# Patient Record
Sex: MALE | Race: WHITE | Employment: FULL TIME | URBAN - METROPOLITAN AREA
[De-identification: names, ages, dates, MRNs, and addresses within clinical notes are randomized per-mention and may not be internally consistent; named-entity substitution may affect disease eponyms.]

---

## 2017-01-03 ENCOUNTER — OFFICE VISIT (OUTPATIENT)
Dept: FAMILY MEDICINE CLINIC | Facility: CLINIC | Age: 32
End: 2017-01-03

## 2017-01-03 ENCOUNTER — TELEPHONE (OUTPATIENT)
Dept: FAMILY MEDICINE CLINIC | Facility: CLINIC | Age: 32
End: 2017-01-03

## 2017-01-03 VITALS
HEIGHT: 68.5 IN | BODY MASS INDEX: 34.16 KG/M2 | DIASTOLIC BLOOD PRESSURE: 72 MMHG | HEART RATE: 74 BPM | SYSTOLIC BLOOD PRESSURE: 124 MMHG | RESPIRATION RATE: 18 BRPM | WEIGHT: 228 LBS

## 2017-01-03 DIAGNOSIS — M54.16 LUMBAR RADICULOPATHY: ICD-10-CM

## 2017-01-03 DIAGNOSIS — Z79.899 HIGH RISK MEDICATION USE: ICD-10-CM

## 2017-01-03 DIAGNOSIS — M51.37 DDD (DEGENERATIVE DISC DISEASE), LUMBOSACRAL: Primary | ICD-10-CM

## 2017-01-03 DIAGNOSIS — R21 RASH: ICD-10-CM

## 2017-01-03 PROCEDURE — 99214 OFFICE O/P EST MOD 30 MIN: CPT | Performed by: FAMILY MEDICINE

## 2017-01-03 RX ORDER — VENLAFAXINE HYDROCHLORIDE 37.5 MG/1
37.5 CAPSULE, EXTENDED RELEASE ORAL DAILY
Qty: 30 CAPSULE | Refills: 2 | Status: SHIPPED | OUTPATIENT
Start: 2017-01-03 | End: 2020-06-19

## 2017-01-03 RX ORDER — LIDOCAINE 50 MG/G
1 PATCH TOPICAL EVERY 24 HOURS
Qty: 15 PATCH | Refills: 0 | Status: SHIPPED | OUTPATIENT
Start: 2017-01-03 | End: 2020-06-19

## 2017-01-03 RX ORDER — NABUMETONE 750 MG/1
750 TABLET, FILM COATED ORAL 2 TIMES DAILY
Qty: 30 TABLET | Refills: 2 | Status: SHIPPED | OUTPATIENT
Start: 2017-01-03 | End: 2020-06-19

## 2017-01-03 NOTE — PATIENT INSTRUCTIONS
-- moisturize hands frequently  -- try to wear gloves at work if possible  -- start steroid cream 2x/day for 1-2 wks  -- start venlafaxine  -- start nabumetone (with food up to 2/xday)  -- start lidocaine patch  -- clarify with insurance regarding pre-exis

## 2017-01-03 NOTE — PROGRESS NOTES
Avelino Shirley is a 32year old male here for Patient presents with:  Medication Follow-Up: The patient would like pain medication      HPI:     Chronic low back pain  -frustrated as no longer able to get norco due to positive marijuana in the expand 11/3/2015    Comment Procedure: LUMBAR FACET INJECTION;  Surgeon: Vipin Tripathi MD;  Location: [facility] MAIN OR     Right 12/17/2015    Comment Procedure: RADIOFREQUENCY ABLATION OF THORACIC OR LUMBAR MEDIAL BRANCH;  Surgeon: Vipin Tripathi MD;  Location: Allergies:  No Known Allergies      ROS:     --GEN: Denies  --HEENT: Denies  --RESP: Denies  --CV: Denies  --GI: Denies  --: Denies  --MSK: see hpi  --NEURO: Denies  --SKIN: Denies  All other systems reviewed are negative    PHYSICAL EXAM:

## 2017-01-19 ENCOUNTER — TELEPHONE (OUTPATIENT)
Dept: SURGERY | Facility: CLINIC | Age: 32
End: 2017-01-19

## 2017-01-26 ENCOUNTER — TELEPHONE (OUTPATIENT)
Dept: SURGERY | Facility: CLINIC | Age: 32
End: 2017-01-26

## 2017-01-26 NOTE — TELEPHONE ENCOUNTER
Patient no showed for injection procedure today. Please cancel the procedure. He no-showed procedure on 1/19/17 as well.    Thanks

## 2017-03-10 ENCOUNTER — TELEPHONE (OUTPATIENT)
Dept: SURGERY | Facility: CLINIC | Age: 32
End: 2017-03-10

## 2018-05-25 ENCOUNTER — HOSPITAL ENCOUNTER (OUTPATIENT)
Age: 33
Discharge: HOME | End: 2018-05-25
Attending: EMERGENCY MEDICINE
Payer: COMMERCIAL

## 2018-05-25 VITALS
OXYGEN SATURATION: 98 % | BODY MASS INDEX: 34 KG/M2 | SYSTOLIC BLOOD PRESSURE: 129 MMHG | WEIGHT: 225 LBS | RESPIRATION RATE: 16 BRPM | TEMPERATURE: 99 F | DIASTOLIC BLOOD PRESSURE: 84 MMHG | HEART RATE: 90 BPM

## 2018-05-25 DIAGNOSIS — L02.31 ABSCESS OF BUTTOCK, RIGHT: Primary | ICD-10-CM

## 2018-05-25 PROCEDURE — 87205 SMEAR GRAM STAIN: CPT | Performed by: EMERGENCY MEDICINE

## 2018-05-25 PROCEDURE — S0077 INJECTION, CLINDAMYCIN PHOSP: HCPCS

## 2018-05-25 PROCEDURE — 87186 SC STD MICRODIL/AGAR DIL: CPT | Performed by: EMERGENCY MEDICINE

## 2018-05-25 PROCEDURE — 87070 CULTURE OTHR SPECIMN AEROBIC: CPT | Performed by: EMERGENCY MEDICINE

## 2018-05-25 PROCEDURE — 96375 TX/PRO/DX INJ NEW DRUG ADDON: CPT

## 2018-05-25 PROCEDURE — 99214 OFFICE O/P EST MOD 30 MIN: CPT

## 2018-05-25 PROCEDURE — 87147 CULTURE TYPE IMMUNOLOGIC: CPT | Performed by: EMERGENCY MEDICINE

## 2018-05-25 PROCEDURE — 96365 THER/PROPH/DIAG IV INF INIT: CPT

## 2018-05-25 RX ORDER — KETOROLAC TROMETHAMINE 30 MG/ML
30 INJECTION, SOLUTION INTRAMUSCULAR; INTRAVENOUS ONCE
Status: DISCONTINUED | OUTPATIENT
Start: 2018-05-25 | End: 2018-05-25

## 2018-05-25 RX ORDER — HYDROCODONE BITARTRATE AND ACETAMINOPHEN 5; 325 MG/1; MG/1
1-2 TABLET ORAL EVERY 6 HOURS PRN
Qty: 20 TABLET | Refills: 0 | Status: SHIPPED | OUTPATIENT
Start: 2018-05-25 | End: 2018-06-04

## 2018-05-25 RX ORDER — KETOROLAC TROMETHAMINE 30 MG/ML
30 INJECTION, SOLUTION INTRAMUSCULAR; INTRAVENOUS ONCE
Status: COMPLETED | OUTPATIENT
Start: 2018-05-25 | End: 2018-05-25

## 2018-05-25 RX ORDER — CLINDAMYCIN HYDROCHLORIDE 300 MG/1
300 CAPSULE ORAL 4 TIMES DAILY
Qty: 40 CAPSULE | Refills: 0 | Status: SHIPPED | OUTPATIENT
Start: 2018-05-25 | End: 2018-06-04

## 2018-05-25 RX ORDER — CLINDAMYCIN PHOSPHATE 600 MG/50ML
600 INJECTION INTRAVENOUS ONCE
Status: COMPLETED | OUTPATIENT
Start: 2018-05-25 | End: 2018-05-25

## 2018-05-25 NOTE — ED PROVIDER NOTES
Patient Seen in: [id number] [facility]    History   Patient presents with:  Bite Sting,Insect (integumentary)    Stated Complaint: insect bite    HPI    Patient complains of a draining wound on his right buttock.   Patient thinks he may have an Temporal  SpO2: 98 %  O2 Device: None (Room air)    Current:/84   Pulse 104   Temp 99.3 °F (37.4 °C) (Temporal)   Resp 16   Wt 102.1 kg   SpO2 98%   BMI 33.71 kg/m²         Physical Exam  Buttock:  On inspection, on the right buttock there is an area Tab  Take 1-2 tablets by mouth every 6 (six) hours as needed.   Qty: 20 tablet Refills: 0

## 2018-05-25 NOTE — ED INITIAL ASSESSMENT (HPI)
Pt noticed a possible bug bite/sting to his right buttocks. Patient feels a little fatigued and achy today. Patient states spouce popped the bite yesterday and noticed brown/red puss coming out of it. Area was red and had black spot in center yesterday.

## 2019-01-24 ENCOUNTER — IMAGING SERVICES (OUTPATIENT)
Dept: OTHER | Age: 34
End: 2019-01-24

## 2019-05-06 ENCOUNTER — TELEPHONE (OUTPATIENT)
Dept: ORTHOPEDICS | Age: 34
End: 2019-05-06

## 2019-05-07 ENCOUNTER — IMAGING SERVICES (OUTPATIENT)
Dept: GENERAL RADIOLOGY | Age: 34
End: 2019-05-07

## 2019-05-09 ENCOUNTER — WORKER'S COMP (OUTPATIENT)
Dept: SPORTS MEDICINE | Age: 34
End: 2019-05-09

## 2019-05-09 VITALS
BODY MASS INDEX: 35.55 KG/M2 | HEIGHT: 69 IN | RESPIRATION RATE: 16 BRPM | HEART RATE: 74 BPM | SYSTOLIC BLOOD PRESSURE: 118 MMHG | WEIGHT: 240 LBS | DIASTOLIC BLOOD PRESSURE: 78 MMHG

## 2019-05-09 DIAGNOSIS — S96.811A RUPTURE OF RIGHT PLANTARIS TENDON, INITIAL ENCOUNTER: Primary | ICD-10-CM

## 2019-05-09 PROCEDURE — L4387 NON-PNEUM WALK BOOT PRE OTS: HCPCS

## 2019-05-09 PROCEDURE — 99204 OFFICE O/P NEW MOD 45 MIN: CPT | Performed by: PEDIATRICS

## 2019-05-09 RX ORDER — NAPROXEN 500 MG/1
500 TABLET ORAL 2 TIMES DAILY WITH MEALS
Qty: 20 TABLET | Refills: 0 | Status: SHIPPED | OUTPATIENT
Start: 2019-05-09 | End: 2019-05-19

## 2019-05-09 RX ORDER — DEXTROAMPHETAMINE SACCHARATE, AMPHETAMINE ASPARTATE, DEXTROAMPHETAMINE SULFATE AND AMPHETAMINE SULFATE 2.5; 2.5; 2.5; 2.5 MG/1; MG/1; MG/1; MG/1
10 TABLET ORAL 3 TIMES DAILY
COMMUNITY

## 2019-05-09 RX ORDER — ALPRAZOLAM 0.5 MG/1
TABLET ORAL
Refills: 3 | COMMUNITY
Start: 2019-04-17 | End: 2020-07-22 | Stop reason: ALTCHOICE

## 2019-05-09 SDOH — HEALTH STABILITY: MENTAL HEALTH: HOW OFTEN DO YOU HAVE A DRINK CONTAINING ALCOHOL?: NEVER

## 2019-05-15 ENCOUNTER — WORKER'S COMP (OUTPATIENT)
Dept: PHYSICAL THERAPY | Age: 34
End: 2019-05-15
Attending: PEDIATRICS

## 2019-05-15 DIAGNOSIS — S96.811D RUPTURE OF RIGHT PLANTARIS TENDON, SUBSEQUENT ENCOUNTER: Primary | ICD-10-CM

## 2019-05-15 PROBLEM — S96.811A: Status: ACTIVE | Noted: 2019-05-15

## 2019-05-15 PROCEDURE — 97010 HOT OR COLD PACKS THERAPY: CPT | Performed by: PHYSICAL THERAPIST

## 2019-05-15 PROCEDURE — 97140 MANUAL THERAPY 1/> REGIONS: CPT | Performed by: PHYSICAL THERAPIST

## 2019-05-15 PROCEDURE — 97014 ELECTRIC STIMULATION THERAPY: CPT | Performed by: PHYSICAL THERAPIST

## 2019-05-15 PROCEDURE — 97112 NEUROMUSCULAR REEDUCATION: CPT | Performed by: PHYSICAL THERAPIST

## 2019-05-15 PROCEDURE — 97161 PT EVAL LOW COMPLEX 20 MIN: CPT | Performed by: PHYSICAL THERAPIST

## 2019-05-15 PROCEDURE — 97110 THERAPEUTIC EXERCISES: CPT | Performed by: PHYSICAL THERAPIST

## 2019-05-21 ENCOUNTER — WORKER'S COMP (OUTPATIENT)
Dept: PHYSICAL THERAPY | Age: 34
End: 2019-05-21

## 2019-05-21 DIAGNOSIS — S96.811D RUPTURE OF RIGHT PLANTARIS TENDON, SUBSEQUENT ENCOUNTER: Primary | ICD-10-CM

## 2019-05-21 PROCEDURE — 97110 THERAPEUTIC EXERCISES: CPT | Performed by: PHYSICAL THERAPIST

## 2019-05-21 PROCEDURE — 97014 ELECTRIC STIMULATION THERAPY: CPT | Performed by: PHYSICAL THERAPIST

## 2019-05-21 PROCEDURE — 97112 NEUROMUSCULAR REEDUCATION: CPT | Performed by: PHYSICAL THERAPIST

## 2019-05-21 PROCEDURE — 97140 MANUAL THERAPY 1/> REGIONS: CPT | Performed by: PHYSICAL THERAPIST

## 2019-05-21 PROCEDURE — 97010 HOT OR COLD PACKS THERAPY: CPT | Performed by: PHYSICAL THERAPIST

## 2019-05-23 ENCOUNTER — WORKER'S COMP (OUTPATIENT)
Dept: PHYSICAL THERAPY | Age: 34
End: 2019-05-23

## 2019-05-23 ENCOUNTER — WORKER'S COMP (OUTPATIENT)
Dept: SPORTS MEDICINE | Age: 34
End: 2019-05-23

## 2019-05-23 DIAGNOSIS — S96.811D RUPTURE OF RIGHT PLANTARIS TENDON, SUBSEQUENT ENCOUNTER: Primary | ICD-10-CM

## 2019-05-23 PROCEDURE — 99213 OFFICE O/P EST LOW 20 MIN: CPT | Performed by: PEDIATRICS

## 2019-05-23 PROCEDURE — 97140 MANUAL THERAPY 1/> REGIONS: CPT | Performed by: PHYSICAL THERAPIST

## 2019-05-23 PROCEDURE — 97112 NEUROMUSCULAR REEDUCATION: CPT | Performed by: PHYSICAL THERAPIST

## 2019-05-23 PROCEDURE — 97110 THERAPEUTIC EXERCISES: CPT | Performed by: PHYSICAL THERAPIST

## 2019-05-23 PROCEDURE — 97014 ELECTRIC STIMULATION THERAPY: CPT | Performed by: PHYSICAL THERAPIST

## 2019-05-23 PROCEDURE — 97010 HOT OR COLD PACKS THERAPY: CPT | Performed by: PHYSICAL THERAPIST

## 2019-06-03 ENCOUNTER — TELEPHONE (OUTPATIENT)
Dept: PHYSICAL THERAPY | Age: 34
End: 2019-06-03

## 2019-06-04 ENCOUNTER — TELEPHONE (OUTPATIENT)
Dept: SPORTS MEDICINE | Age: 34
End: 2019-06-04

## 2019-06-04 ENCOUNTER — OFFICE VISIT (OUTPATIENT)
Dept: SPORTS MEDICINE | Age: 34
End: 2019-06-04

## 2019-06-04 DIAGNOSIS — S96.811D RUPTURE OF RIGHT PLANTARIS TENDON, SUBSEQUENT ENCOUNTER: Primary | ICD-10-CM

## 2019-06-04 DIAGNOSIS — M25.371 ANKLE JOINT INSTABILITY, RIGHT: ICD-10-CM

## 2019-06-04 PROCEDURE — 99213 OFFICE O/P EST LOW 20 MIN: CPT | Performed by: PEDIATRICS

## 2019-06-04 PROCEDURE — L1902 AFO ANKLE GAUNTLET PRE OTS: HCPCS

## 2019-06-05 ENCOUNTER — WORKER'S COMP (OUTPATIENT)
Dept: PHYSICAL THERAPY | Age: 34
End: 2019-06-05

## 2019-06-05 DIAGNOSIS — S96.811D RUPTURE OF RIGHT PLANTARIS TENDON, SUBSEQUENT ENCOUNTER: Primary | ICD-10-CM

## 2019-06-05 PROCEDURE — 97110 THERAPEUTIC EXERCISES: CPT | Performed by: PHYSICAL THERAPIST

## 2019-06-05 PROCEDURE — 97112 NEUROMUSCULAR REEDUCATION: CPT | Performed by: PHYSICAL THERAPIST

## 2019-06-05 PROCEDURE — 97140 MANUAL THERAPY 1/> REGIONS: CPT | Performed by: PHYSICAL THERAPIST

## 2019-06-11 ENCOUNTER — WORKER'S COMP (OUTPATIENT)
Dept: PHYSICAL THERAPY | Age: 34
End: 2019-06-11

## 2019-06-11 DIAGNOSIS — S96.811D RUPTURE OF RIGHT PLANTARIS TENDON, SUBSEQUENT ENCOUNTER: Primary | ICD-10-CM

## 2019-06-11 PROCEDURE — 97112 NEUROMUSCULAR REEDUCATION: CPT | Performed by: PHYSICAL THERAPIST

## 2019-06-11 PROCEDURE — 97014 ELECTRIC STIMULATION THERAPY: CPT | Performed by: PHYSICAL THERAPIST

## 2019-06-11 PROCEDURE — 97110 THERAPEUTIC EXERCISES: CPT | Performed by: PHYSICAL THERAPIST

## 2019-06-11 PROCEDURE — 97140 MANUAL THERAPY 1/> REGIONS: CPT | Performed by: PHYSICAL THERAPIST

## 2019-06-17 ENCOUNTER — TELEPHONE (OUTPATIENT)
Dept: PHYSICAL THERAPY | Age: 34
End: 2019-06-17

## 2019-06-17 DIAGNOSIS — S96.811D RUPTURE OF RIGHT PLANTARIS TENDON, SUBSEQUENT ENCOUNTER: Primary | ICD-10-CM

## 2019-06-25 ENCOUNTER — WORKER'S COMP (OUTPATIENT)
Dept: SPORTS MEDICINE | Age: 34
End: 2019-06-25

## 2019-06-25 ENCOUNTER — TELEPHONE (OUTPATIENT)
Dept: SPORTS MEDICINE | Age: 34
End: 2019-06-25

## 2019-06-25 DIAGNOSIS — T14.8XXA CONTUSION OF BONE: ICD-10-CM

## 2019-06-25 DIAGNOSIS — S96.811D RUPTURE OF RIGHT PLANTARIS TENDON, SUBSEQUENT ENCOUNTER: Primary | ICD-10-CM

## 2019-06-25 DIAGNOSIS — M25.374 SUBTALAR JOINT INSTABILITY, RIGHT: ICD-10-CM

## 2019-06-25 PROCEDURE — 99213 OFFICE O/P EST LOW 20 MIN: CPT | Performed by: PEDIATRICS

## 2019-06-25 PROCEDURE — L4387 NON-PNEUM WALK BOOT PRE OTS: HCPCS

## 2019-06-26 DIAGNOSIS — S96.811D RUPTURE OF RIGHT PLANTARIS TENDON, SUBSEQUENT ENCOUNTER: ICD-10-CM

## 2019-07-18 ENCOUNTER — TELEPHONE (OUTPATIENT)
Dept: SCHEDULING | Age: 34
End: 2019-07-18

## 2019-07-23 ENCOUNTER — IMAGING SERVICES (OUTPATIENT)
Dept: GENERAL RADIOLOGY | Age: 34
End: 2019-07-23
Attending: PEDIATRICS

## 2019-07-23 ENCOUNTER — TELEPHONE (OUTPATIENT)
Dept: SPORTS MEDICINE | Age: 34
End: 2019-07-23

## 2019-07-23 ENCOUNTER — WORKER'S COMP (OUTPATIENT)
Dept: SPORTS MEDICINE | Age: 34
End: 2019-07-23

## 2019-07-23 DIAGNOSIS — S92.124A CLOSED NONDISPLACED FRACTURE OF BODY OF RIGHT TALUS, INITIAL ENCOUNTER: Primary | ICD-10-CM

## 2019-07-23 DIAGNOSIS — S96.811D RUPTURE OF RIGHT PLANTARIS TENDON, SUBSEQUENT ENCOUNTER: ICD-10-CM

## 2019-07-23 PROCEDURE — 73610 X-RAY EXAM OF ANKLE: CPT | Performed by: RADIOLOGY

## 2019-07-23 PROCEDURE — 99213 OFFICE O/P EST LOW 20 MIN: CPT | Performed by: PEDIATRICS

## 2019-07-31 ENCOUNTER — WORKER'S COMP (OUTPATIENT)
Dept: PODIATRY | Age: 34
End: 2019-07-31

## 2019-07-31 DIAGNOSIS — S90.31XD CONTUSION OF RIGHT FOOT, SUBSEQUENT ENCOUNTER: ICD-10-CM

## 2019-07-31 DIAGNOSIS — S92.135A CLOSED NONDISPLACED FRACTURE OF POSTERIOR PROCESS OF LEFT TALUS, INITIAL ENCOUNTER: Primary | ICD-10-CM

## 2019-07-31 DIAGNOSIS — M65.9 SYNOVITIS OF RIGHT FOOT: ICD-10-CM

## 2019-07-31 DIAGNOSIS — M65.9 SYNOVITIS OF RIGHT ANKLE: ICD-10-CM

## 2019-07-31 PROCEDURE — 99244 OFF/OP CNSLTJ NEW/EST MOD 40: CPT | Performed by: PODIATRIST

## 2019-07-31 PROCEDURE — 20600 DRAIN/INJ JOINT/BURSA W/O US: CPT | Performed by: PODIATRIST

## 2019-07-31 PROCEDURE — 20605 DRAIN/INJ JOINT/BURSA W/O US: CPT | Performed by: PODIATRIST

## 2019-07-31 RX ORDER — DEXAMETHASONE SODIUM PHOSPHATE 4 MG/ML
4 INJECTION, SOLUTION INTRA-ARTICULAR; INTRALESIONAL; INTRAMUSCULAR; INTRAVENOUS; SOFT TISSUE ONCE
Status: COMPLETED | OUTPATIENT
Start: 2019-07-31 | End: 2019-08-01

## 2019-07-31 RX ORDER — DEXAMETHASONE SODIUM PHOSPHATE 4 MG/ML
8 INJECTION, SOLUTION INTRA-ARTICULAR; INTRALESIONAL; INTRAMUSCULAR; INTRAVENOUS; SOFT TISSUE ONCE
Status: CANCELLED | OUTPATIENT
Start: 2019-07-31 | End: 2019-07-31

## 2019-07-31 RX ORDER — METHYLPREDNISOLONE 4 MG/1
TABLET ORAL
Qty: 21 TABLET | Refills: 0 | Status: SHIPPED | OUTPATIENT
Start: 2019-07-31 | End: 2019-11-08 | Stop reason: ALTCHOICE

## 2019-07-31 RX ORDER — METHYLPREDNISOLONE ACETATE 40 MG/ML
40 INJECTION, SUSPENSION INTRA-ARTICULAR; INTRALESIONAL; INTRAMUSCULAR; SOFT TISSUE ONCE
Status: COMPLETED | OUTPATIENT
Start: 2019-07-31 | End: 2019-08-01

## 2019-08-01 RX ADMIN — DEXAMETHASONE SODIUM PHOSPHATE 4 MG: 4 INJECTION, SOLUTION INTRA-ARTICULAR; INTRALESIONAL; INTRAMUSCULAR; INTRAVENOUS; SOFT TISSUE at 08:44

## 2019-08-01 RX ADMIN — METHYLPREDNISOLONE ACETATE 40 MG: 40 INJECTION, SUSPENSION INTRA-ARTICULAR; INTRALESIONAL; INTRAMUSCULAR; SOFT TISSUE at 08:41

## 2019-08-01 RX ADMIN — DEXAMETHASONE SODIUM PHOSPHATE 4 MG: 4 INJECTION, SOLUTION INTRA-ARTICULAR; INTRALESIONAL; INTRAMUSCULAR; INTRAVENOUS; SOFT TISSUE at 08:45

## 2019-08-08 ENCOUNTER — HOSPITAL ENCOUNTER (OUTPATIENT)
Age: 34
Discharge: HOME | End: 2019-08-08
Attending: FAMILY MEDICINE
Payer: COMMERCIAL

## 2019-08-08 VITALS
HEART RATE: 76 BPM | RESPIRATION RATE: 16 BRPM | DIASTOLIC BLOOD PRESSURE: 58 MMHG | HEIGHT: 69 IN | OXYGEN SATURATION: 96 % | SYSTOLIC BLOOD PRESSURE: 115 MMHG | BODY MASS INDEX: 35.55 KG/M2 | TEMPERATURE: 99 F | WEIGHT: 240 LBS

## 2019-08-08 DIAGNOSIS — R09.81 SINUS CONGESTION: ICD-10-CM

## 2019-08-08 DIAGNOSIS — H10.33 ACUTE CONJUNCTIVITIS OF BOTH EYES, UNSPECIFIED ACUTE CONJUNCTIVITIS TYPE: Primary | ICD-10-CM

## 2019-08-08 DIAGNOSIS — J06.9 UPPER RESPIRATORY TRACT INFECTION, UNSPECIFIED TYPE: ICD-10-CM

## 2019-08-08 PROCEDURE — 99214 OFFICE O/P EST MOD 30 MIN: CPT

## 2019-08-08 PROCEDURE — 99213 OFFICE O/P EST LOW 20 MIN: CPT

## 2019-08-08 RX ORDER — TOBRAMYCIN 3 MG/ML
1 SOLUTION/ DROPS OPHTHALMIC EVERY 6 HOURS
Qty: 1 BOTTLE | Refills: 0 | Status: SHIPPED | OUTPATIENT
Start: 2019-08-08 | End: 2019-08-15

## 2019-08-08 RX ORDER — PREDNISONE 20 MG/1
40 TABLET ORAL DAILY
Qty: 10 TABLET | Refills: 0 | Status: SHIPPED | OUTPATIENT
Start: 2019-08-08 | End: 2019-08-13

## 2019-08-08 NOTE — ED INITIAL ASSESSMENT (HPI)
Pt states his daughter was sick 2 days ago with a cold. Pt for the past 2 days has had sinus congestion and cough and now redness, irritation and discharge from both eyes.

## 2019-08-08 NOTE — ED PROVIDER NOTES
Patient Seen in: [id number] [facility] [place]    History   Patient presents with:  Conjunctivitis    Stated Complaint: eye problem    HPI    **66-year-old male presents to the immediate care today with a 2-day history of nasal congestion, dry cough, s MAIN OR   • TRANSFORAMINAL LUMBAR EPIDURAL STEROID INJECTION MULTIPLE LEVEL AND BILATERAL Bilateral 9/8/2015    Performed by Jeramie Hammond MD at [facility] MAIN OR   • TRANSFORAMINAL LUMBAR EPIDURAL STEROID INJECTION MULTIPLE LEVEL AND BILATERAL Bilateral 9/1/ crusting noted. Cornea clear. No FB in cornea. Extra ocular muscles intact bilaterally. Normal visual acuity. Ears: normal TM's and external ear canals both ears  Nose: Nasal mucosa is congested. No sinus tenderness.   Tonsils are clear no exudates bilat

## 2019-08-14 NOTE — ED NOTES
Called stating his eye drop bottle had leaked and will not have enough drops for full treatment. He states he had 3 days of drops and has not used them in a couple days, but still having redness and itching.  Discussed with Dr Ruth Bach and recommend a reche

## 2019-08-22 ENCOUNTER — WORKER'S COMP (OUTPATIENT)
Dept: PODIATRY | Age: 34
End: 2019-08-22

## 2019-08-22 DIAGNOSIS — M65.9 SYNOVITIS OF RIGHT FOOT: ICD-10-CM

## 2019-08-22 DIAGNOSIS — S92.135P: Primary | ICD-10-CM

## 2019-08-22 DIAGNOSIS — S90.31XD CONTUSION OF RIGHT FOOT, SUBSEQUENT ENCOUNTER: ICD-10-CM

## 2019-08-22 DIAGNOSIS — M65.9 SYNOVITIS OF RIGHT ANKLE: ICD-10-CM

## 2019-08-22 PROCEDURE — 20605 DRAIN/INJ JOINT/BURSA W/O US: CPT | Performed by: PODIATRIST

## 2019-08-22 PROCEDURE — 20600 DRAIN/INJ JOINT/BURSA W/O US: CPT | Performed by: PODIATRIST

## 2019-08-22 PROCEDURE — 99214 OFFICE O/P EST MOD 30 MIN: CPT | Performed by: PODIATRIST

## 2019-08-27 ENCOUNTER — TELEPHONE (OUTPATIENT)
Dept: PODIATRY | Age: 34
End: 2019-08-27

## 2019-08-28 ENCOUNTER — TELEPHONE (OUTPATIENT)
Dept: PODIATRY | Age: 34
End: 2019-08-28

## 2019-08-29 PROBLEM — S92.135P: Status: ACTIVE | Noted: 2019-08-29

## 2019-09-05 ENCOUNTER — WORKER'S COMP (OUTPATIENT)
Dept: PODIATRY | Age: 34
End: 2019-09-05

## 2019-09-05 ENCOUNTER — TELEPHONE (OUTPATIENT)
Dept: PODIATRY | Age: 34
End: 2019-09-05

## 2019-09-05 DIAGNOSIS — M65.9 SYNOVITIS OF RIGHT FOOT: ICD-10-CM

## 2019-09-05 DIAGNOSIS — S92.131P: Primary | ICD-10-CM

## 2019-09-05 DIAGNOSIS — R93.7 BONE MARROW EDEMA: ICD-10-CM

## 2019-09-05 PROCEDURE — 99214 OFFICE O/P EST MOD 30 MIN: CPT | Performed by: PODIATRIST

## 2019-09-05 RX ORDER — DICLOFENAC SODIUM 75 MG/1
75 TABLET, DELAYED RELEASE ORAL 2 TIMES DAILY
Qty: 60 TABLET | Refills: 0 | Status: SHIPPED | OUTPATIENT
Start: 2019-09-05 | End: 2019-11-08 | Stop reason: ALTCHOICE

## 2019-09-05 SDOH — HEALTH STABILITY: MENTAL HEALTH: HOW OFTEN DO YOU HAVE A DRINK CONTAINING ALCOHOL?: NEVER

## 2019-09-10 PROBLEM — S92.131P: Status: ACTIVE | Noted: 2019-08-29

## 2019-09-27 ENCOUNTER — TELEPHONE (OUTPATIENT)
Dept: ORTHOPEDICS | Age: 34
End: 2019-09-27

## 2019-10-28 ENCOUNTER — TELEPHONE (OUTPATIENT)
Dept: ORTHOPEDICS | Age: 34
End: 2019-10-28

## 2019-10-30 ENCOUNTER — TELEPHONE (OUTPATIENT)
Dept: PODIATRY | Age: 34
End: 2019-10-30

## 2019-10-30 DIAGNOSIS — S92.135P: Primary | ICD-10-CM

## 2019-11-07 ENCOUNTER — LAB SERVICES (OUTPATIENT)
Dept: LAB | Age: 34
End: 2019-11-07

## 2019-11-07 DIAGNOSIS — M65.9 SYNOVITIS OF RIGHT ANKLE: Primary | ICD-10-CM

## 2019-11-07 DIAGNOSIS — M65.9 SYNOVITIS OF RIGHT ANKLE: ICD-10-CM

## 2019-11-07 LAB
BUN SERPL-MCNC: 21 MG/DL (ref 6–27)
CALCIUM SERPL-MCNC: 8.9 MG/DL (ref 8.6–10.6)
CHLORIDE SERPL-SCNC: 102 MMOL/L (ref 96–107)
CREAT SERPL-MCNC: 1.2 MG/DL (ref 0.6–1.6)
GFR SERPL CREATININE-BSD FRML MDRD: >60 ML/MIN/{1.73M2}
GFR SERPL CREATININE-BSD FRML MDRD: >60 ML/MIN/{1.73M2}
GLUCOSE SERPL-MCNC: 93 MG/DL (ref 70–200)
HCO3 SERPL-SCNC: 23 MMOL/L (ref 22–32)
POTASSIUM SERPL-SCNC: 3.8 MMOL/L (ref 3.5–5.3)
SODIUM SERPL-SCNC: 137 MMOL/L (ref 136–146)

## 2019-11-07 PROCEDURE — 36415 COLL VENOUS BLD VENIPUNCTURE: CPT | Performed by: PODIATRIST

## 2019-11-07 PROCEDURE — 80048 BASIC METABOLIC PNL TOTAL CA: CPT | Performed by: PODIATRIST

## 2019-11-08 ENCOUNTER — OFFICE VISIT (OUTPATIENT)
Dept: FAMILY MEDICINE | Age: 34
End: 2019-11-08

## 2019-11-08 ENCOUNTER — LAB SERVICES (OUTPATIENT)
Dept: LAB | Age: 34
End: 2019-11-08

## 2019-11-08 VITALS
DIASTOLIC BLOOD PRESSURE: 80 MMHG | HEIGHT: 69 IN | BODY MASS INDEX: 34.21 KG/M2 | RESPIRATION RATE: 18 BRPM | HEART RATE: 102 BPM | SYSTOLIC BLOOD PRESSURE: 120 MMHG | WEIGHT: 231 LBS

## 2019-11-08 DIAGNOSIS — H61.20 EXCESSIVE CERUMEN IN EAR CANAL, UNSPECIFIED LATERALITY: ICD-10-CM

## 2019-11-08 DIAGNOSIS — S92.131P: ICD-10-CM

## 2019-11-08 DIAGNOSIS — Z01.818 PRE-OP TESTING: Primary | ICD-10-CM

## 2019-11-08 DIAGNOSIS — Z01.818 PRE-OP TESTING: ICD-10-CM

## 2019-11-08 DIAGNOSIS — G47.33 OSA (OBSTRUCTIVE SLEEP APNEA): ICD-10-CM

## 2019-11-08 DIAGNOSIS — J35.8 TONSIL ASYMMETRY: ICD-10-CM

## 2019-11-08 LAB
BASOPHIL %: 0.1 % (ref 0–1.2)
BASOPHIL ABSOLUTE #: 0 10*3/UL (ref 0–0.1)
DIFFERENTIAL TYPE: NORMAL
EOSINOPHIL %: 1 % (ref 0–10)
EOSINOPHIL ABSOLUTE #: 0.1 10*3/UL (ref 0–0.5)
HEMATOCRIT: 42.4 % (ref 40–51)
HEMOGLOBIN: 14.1 G/DL (ref 13.7–17.5)
LYMPH PERCENT: 34.2 % (ref 20.5–51.1)
LYMPHOCYTE ABSOLUTE #: 2.3 10*3/UL (ref 1.2–3.4)
MEAN CORPUSCULAR HGB CONCENTRATION: 33.3 % (ref 32–36)
MEAN CORPUSCULAR HGB: 29.1 PG (ref 27–34)
MEAN CORPUSCULAR VOLUME: 87.4 FL (ref 79–95)
MEAN PLATELET VOLUME: 9.7 FL (ref 8.6–12.4)
MONOCYTE ABSOLUTE #: 0.6 10*3/UL (ref 0.2–0.9)
MONOCYTE PERCENT: 8.5 % (ref 4.3–12.9)
NEUTROPHIL ABSOLUTE #: 3.8 10*3/UL (ref 1.4–6.5)
NEUTROPHIL PERCENT: 56.2 % (ref 34–73.5)
PLATELET COUNT: 185 10*3/UL (ref 150–400)
RED BLOOD CELL COUNT: 4.85 10*6/UL (ref 3.9–5.7)
RED CELL DISTRIBUTION WIDTH: 12.5 % (ref 11.3–14.8)
WHITE BLOOD CELL COUNT: 6.7 10*3/UL (ref 4–10)

## 2019-11-08 PROCEDURE — 36415 COLL VENOUS BLD VENIPUNCTURE: CPT | Performed by: FAMILY MEDICINE

## 2019-11-08 PROCEDURE — 99244 OFF/OP CNSLTJ NEW/EST MOD 40: CPT | Performed by: FAMILY MEDICINE

## 2019-11-08 PROCEDURE — 85025 COMPLETE CBC W/AUTO DIFF WBC: CPT | Performed by: FAMILY MEDICINE

## 2019-11-08 ASSESSMENT — ENCOUNTER SYMPTOMS
DIARRHEA: 0
WEAKNESS: 0
ADENOPATHY: 0
ABDOMINAL PAIN: 0
SHORTNESS OF BREATH: 0
CONSTIPATION: 0
NUMBNESS: 0
HEADACHES: 0
VOMITING: 0
CHILLS: 0
LIGHT-HEADEDNESS: 0
TROUBLE SWALLOWING: 0
UNEXPECTED WEIGHT CHANGE: 0
WHEEZING: 0
DIZZINESS: 0
BRUISES/BLEEDS EASILY: 0
FATIGUE: 0
NAUSEA: 0
COUGH: 0

## 2019-11-08 ASSESSMENT — PATIENT HEALTH QUESTIONNAIRE - PHQ9
SUM OF ALL RESPONSES TO PHQ9 QUESTIONS 1 AND 2: 0
2. FEELING DOWN, DEPRESSED OR HOPELESS: NOT AT ALL
1. LITTLE INTEREST OR PLEASURE IN DOING THINGS: NOT AT ALL
SUM OF ALL RESPONSES TO PHQ9 QUESTIONS 1 AND 2: 0

## 2019-11-19 ENCOUNTER — IMAGING SERVICES (OUTPATIENT)
Dept: GENERAL RADIOLOGY | Age: 34
End: 2019-11-19
Attending: PODIATRIST

## 2019-11-19 ENCOUNTER — OFFICE VISIT (OUTPATIENT)
Dept: PODIATRY | Age: 34
End: 2019-11-19

## 2019-11-19 DIAGNOSIS — M89.8X7: ICD-10-CM

## 2019-11-19 DIAGNOSIS — M77.31 CALCANEAL SPUR OF RIGHT FOOT: ICD-10-CM

## 2019-11-19 DIAGNOSIS — M65.9 SYNOVITIS OF RIGHT FOOT: ICD-10-CM

## 2019-11-19 DIAGNOSIS — S92.131P: Primary | ICD-10-CM

## 2019-11-19 DIAGNOSIS — M77.31 CALCANEAL SPUR, RIGHT FOOT: ICD-10-CM

## 2019-11-19 DIAGNOSIS — S92.135P: ICD-10-CM

## 2019-11-19 PROCEDURE — 73610 X-RAY EXAM OF ANKLE: CPT | Performed by: RADIOLOGY

## 2019-11-19 PROCEDURE — 88311 DECALCIFY TISSUE: CPT | Performed by: PATHOLOGY

## 2019-11-19 PROCEDURE — 27695 REPAIR OF ANKLE LIGAMENT: CPT | Performed by: PODIATRIST

## 2019-11-19 PROCEDURE — 88305 TISSUE EXAM BY PATHOLOGIST: CPT | Performed by: PATHOLOGY

## 2019-11-19 PROCEDURE — 28118 REMOVAL OF HEEL BONE: CPT | Performed by: PODIATRIST

## 2019-11-19 PROCEDURE — 28120 PART REMOVAL OF ANKLE/HEEL: CPT | Performed by: PODIATRIST

## 2019-11-20 ENCOUNTER — TELEPHONE (OUTPATIENT)
Dept: PODIATRY | Age: 34
End: 2019-11-20

## 2019-11-26 LAB — AP REPORT: NORMAL

## 2019-11-27 ENCOUNTER — WORKER'S COMP (OUTPATIENT)
Dept: PODIATRY | Age: 34
End: 2019-11-27

## 2019-11-27 DIAGNOSIS — M65.9 SYNOVITIS OF RIGHT ANKLE: ICD-10-CM

## 2019-11-27 DIAGNOSIS — M65.9 SYNOVITIS OF RIGHT FOOT: ICD-10-CM

## 2019-11-27 DIAGNOSIS — Z98.890 S/P FOOT SURGERY, RIGHT: Primary | ICD-10-CM

## 2019-11-27 DIAGNOSIS — S92.131P: ICD-10-CM

## 2019-11-27 PROCEDURE — 99024 POSTOP FOLLOW-UP VISIT: CPT | Performed by: NURSE PRACTITIONER

## 2019-11-27 PROCEDURE — 29405 APPL SHORT LEG CAST: CPT | Performed by: NURSE PRACTITIONER

## 2019-11-27 RX ORDER — HYDROCODONE BITARTRATE AND ACETAMINOPHEN 5; 325 MG/1; MG/1
TABLET ORAL
Qty: 30 TABLET | Refills: 0 | Status: SHIPPED | OUTPATIENT
Start: 2019-11-27 | End: 2020-08-13 | Stop reason: DRUGHIGH

## 2019-11-27 RX ORDER — HYDROCODONE BITARTRATE AND ACETAMINOPHEN 5; 325 MG/1; MG/1
TABLET ORAL
Refills: 0 | COMMUNITY
Start: 2019-11-19 | End: 2019-11-27 | Stop reason: SDUPTHER

## 2019-11-27 SDOH — HEALTH STABILITY: MENTAL HEALTH: HOW OFTEN DO YOU HAVE A DRINK CONTAINING ALCOHOL?: NEVER

## 2019-11-27 ASSESSMENT — ENCOUNTER SYMPTOMS
COLOR CHANGE: 0
WOUND: 0
VOMITING: 0
NAUSEA: 0
DIZZINESS: 0
SHORTNESS OF BREATH: 0
POLYDIPSIA: 0
CHILLS: 0
BRUISES/BLEEDS EASILY: 0
FEVER: 0

## 2019-12-11 ENCOUNTER — WORKER'S COMP (OUTPATIENT)
Dept: PODIATRY | Age: 34
End: 2019-12-11

## 2019-12-11 ENCOUNTER — TELEPHONE (OUTPATIENT)
Dept: PODIATRY | Age: 34
End: 2019-12-11

## 2019-12-11 DIAGNOSIS — Z98.890 S/P FOOT SURGERY, RIGHT: Primary | ICD-10-CM

## 2019-12-11 DIAGNOSIS — S92.131P: ICD-10-CM

## 2019-12-11 PROCEDURE — L4386 NON-PNEUM WALK BOOT PRE CST: HCPCS | Performed by: PODIATRIST

## 2019-12-11 PROCEDURE — 99024 POSTOP FOLLOW-UP VISIT: CPT | Performed by: PODIATRIST

## 2020-01-06 ENCOUNTER — WORKER'S COMP (OUTPATIENT)
Dept: PHYSICAL THERAPY | Age: 35
End: 2020-01-06
Attending: PODIATRIST

## 2020-01-06 DIAGNOSIS — S92.131P: Primary | ICD-10-CM

## 2020-01-06 PROCEDURE — 97161 PT EVAL LOW COMPLEX 20 MIN: CPT | Performed by: PHYSICAL THERAPIST

## 2020-01-06 PROCEDURE — 97140 MANUAL THERAPY 1/> REGIONS: CPT | Performed by: PHYSICAL THERAPIST

## 2020-01-06 PROCEDURE — 97116 GAIT TRAINING THERAPY: CPT | Performed by: PHYSICAL THERAPIST

## 2020-01-06 PROCEDURE — G0283 ELEC STIM OTHER THAN WOUND: HCPCS | Performed by: PHYSICAL THERAPIST

## 2020-01-06 PROCEDURE — 97010 HOT OR COLD PACKS THERAPY: CPT | Performed by: PHYSICAL THERAPIST

## 2020-01-06 ASSESSMENT — ENCOUNTER SYMPTOMS
PAIN SEVERITY NOW: 5
PAIN FREQUENCY: CONSTANT
QUALITY: ACHE
SUBJECTIVE PAIN PROGRESSION: NO CHANGE
PAIN SCALE AT HIGHEST: 9
ALLEVIATING FACTORS: UNABLE TO STATE ANYTHING THAT HELPS REDUCE THE PAIN

## 2020-01-09 ENCOUNTER — WORKER'S COMP (OUTPATIENT)
Dept: PODIATRY | Age: 35
End: 2020-01-09

## 2020-01-09 ENCOUNTER — IMAGING SERVICES (OUTPATIENT)
Dept: GENERAL RADIOLOGY | Age: 35
End: 2020-01-09
Attending: PODIATRIST

## 2020-01-09 ENCOUNTER — TELEPHONE (OUTPATIENT)
Dept: PODIATRY | Age: 35
End: 2020-01-09

## 2020-01-09 ENCOUNTER — WORKER'S COMP (OUTPATIENT)
Dept: PHYSICAL THERAPY | Age: 35
End: 2020-01-09

## 2020-01-09 DIAGNOSIS — Z98.890 S/P FOOT SURGERY, RIGHT: ICD-10-CM

## 2020-01-09 DIAGNOSIS — S92.135P: ICD-10-CM

## 2020-01-09 DIAGNOSIS — M89.8X7: ICD-10-CM

## 2020-01-09 DIAGNOSIS — S92.131P: Primary | ICD-10-CM

## 2020-01-09 DIAGNOSIS — Z98.890 S/P FOOT SURGERY, RIGHT: Primary | ICD-10-CM

## 2020-01-09 PROCEDURE — 97010 HOT OR COLD PACKS THERAPY: CPT | Performed by: PHYSICAL THERAPIST

## 2020-01-09 PROCEDURE — 97110 THERAPEUTIC EXERCISES: CPT | Performed by: PHYSICAL THERAPIST

## 2020-01-09 PROCEDURE — 97140 MANUAL THERAPY 1/> REGIONS: CPT | Performed by: PHYSICAL THERAPIST

## 2020-01-09 PROCEDURE — 99024 POSTOP FOLLOW-UP VISIT: CPT | Performed by: PODIATRIST

## 2020-01-09 PROCEDURE — 97014 ELECTRIC STIMULATION THERAPY: CPT | Performed by: PHYSICAL THERAPIST

## 2020-01-09 PROCEDURE — 97112 NEUROMUSCULAR REEDUCATION: CPT | Performed by: PHYSICAL THERAPIST

## 2020-01-09 PROCEDURE — 73610 X-RAY EXAM OF ANKLE: CPT | Performed by: PODIATRIST

## 2020-01-13 ENCOUNTER — APPOINTMENT (OUTPATIENT)
Dept: PHYSICAL THERAPY | Age: 35
End: 2020-01-13

## 2020-01-13 ENCOUNTER — WORKER'S COMP (OUTPATIENT)
Dept: PHYSICAL THERAPY | Age: 35
End: 2020-01-13

## 2020-01-13 ENCOUNTER — TELEPHONE (OUTPATIENT)
Dept: SCHEDULING | Age: 35
End: 2020-01-13

## 2020-01-13 PROCEDURE — 97014 ELECTRIC STIMULATION THERAPY: CPT | Performed by: PHYSICAL THERAPIST

## 2020-01-13 PROCEDURE — 97110 THERAPEUTIC EXERCISES: CPT | Performed by: PHYSICAL THERAPIST

## 2020-01-13 PROCEDURE — 97010 HOT OR COLD PACKS THERAPY: CPT | Performed by: PHYSICAL THERAPIST

## 2020-01-13 PROCEDURE — 97116 GAIT TRAINING THERAPY: CPT | Performed by: PHYSICAL THERAPIST

## 2020-01-13 PROCEDURE — 97140 MANUAL THERAPY 1/> REGIONS: CPT | Performed by: PHYSICAL THERAPIST

## 2020-01-13 RX ORDER — DICLOFENAC SODIUM 75 MG/1
75 TABLET, DELAYED RELEASE ORAL 2 TIMES DAILY
Qty: 60 TABLET | Refills: 0 | Status: SHIPPED | OUTPATIENT
Start: 2020-01-13 | End: 2020-02-07 | Stop reason: SDUPTHER

## 2020-01-13 RX ORDER — HYDROCODONE BITARTRATE AND ACETAMINOPHEN 5; 325 MG/1; MG/1
TABLET ORAL
Qty: 10 TABLET | Refills: 0 | OUTPATIENT
Start: 2020-01-13 | End: 2020-08-13 | Stop reason: DRUGHIGH

## 2020-01-15 ENCOUNTER — WORKER'S COMP (OUTPATIENT)
Dept: PHYSICAL THERAPY | Age: 35
End: 2020-01-15

## 2020-01-15 PROCEDURE — 97110 THERAPEUTIC EXERCISES: CPT | Performed by: PHYSICAL THERAPIST

## 2020-01-15 PROCEDURE — 97014 ELECTRIC STIMULATION THERAPY: CPT | Performed by: PHYSICAL THERAPIST

## 2020-01-15 PROCEDURE — 97140 MANUAL THERAPY 1/> REGIONS: CPT | Performed by: PHYSICAL THERAPIST

## 2020-01-15 PROCEDURE — 97010 HOT OR COLD PACKS THERAPY: CPT | Performed by: PHYSICAL THERAPIST

## 2020-01-15 ASSESSMENT — ENCOUNTER SYMPTOMS: PAIN SEVERITY NOW: 7

## 2020-01-17 ENCOUNTER — WORKER'S COMP (OUTPATIENT)
Dept: PHYSICAL THERAPY | Age: 35
End: 2020-01-17

## 2020-01-17 PROCEDURE — 97110 THERAPEUTIC EXERCISES: CPT | Performed by: PHYSICAL THERAPIST

## 2020-01-17 PROCEDURE — 97112 NEUROMUSCULAR REEDUCATION: CPT | Performed by: PHYSICAL THERAPIST

## 2020-01-17 PROCEDURE — 97010 HOT OR COLD PACKS THERAPY: CPT | Performed by: PHYSICAL THERAPIST

## 2020-01-17 PROCEDURE — 97014 ELECTRIC STIMULATION THERAPY: CPT | Performed by: PHYSICAL THERAPIST

## 2020-01-17 PROCEDURE — 97140 MANUAL THERAPY 1/> REGIONS: CPT | Performed by: PHYSICAL THERAPIST

## 2020-01-20 ENCOUNTER — WORKER'S COMP (OUTPATIENT)
Dept: PHYSICAL THERAPY | Age: 35
End: 2020-01-20

## 2020-01-20 DIAGNOSIS — S92.131P: ICD-10-CM

## 2020-01-20 PROCEDURE — 97010 HOT OR COLD PACKS THERAPY: CPT | Performed by: PHYSICAL THERAPIST

## 2020-01-20 PROCEDURE — 97140 MANUAL THERAPY 1/> REGIONS: CPT | Performed by: PHYSICAL THERAPIST

## 2020-01-20 PROCEDURE — 97110 THERAPEUTIC EXERCISES: CPT | Performed by: PHYSICAL THERAPIST

## 2020-01-20 PROCEDURE — 97014 ELECTRIC STIMULATION THERAPY: CPT | Performed by: PHYSICAL THERAPIST

## 2020-01-20 PROCEDURE — 97112 NEUROMUSCULAR REEDUCATION: CPT | Performed by: PHYSICAL THERAPIST

## 2020-01-22 ENCOUNTER — WORKER'S COMP (OUTPATIENT)
Dept: PHYSICAL THERAPY | Age: 35
End: 2020-01-22

## 2020-01-22 DIAGNOSIS — S92.131P: ICD-10-CM

## 2020-01-22 PROCEDURE — 97010 HOT OR COLD PACKS THERAPY: CPT | Performed by: PHYSICAL THERAPIST

## 2020-01-22 PROCEDURE — 97110 THERAPEUTIC EXERCISES: CPT | Performed by: PHYSICAL THERAPIST

## 2020-01-22 PROCEDURE — 97140 MANUAL THERAPY 1/> REGIONS: CPT | Performed by: PHYSICAL THERAPIST

## 2020-01-22 PROCEDURE — 97014 ELECTRIC STIMULATION THERAPY: CPT | Performed by: PHYSICAL THERAPIST

## 2020-01-23 ENCOUNTER — WORKER'S COMP (OUTPATIENT)
Dept: PODIATRY | Age: 35
End: 2020-01-23

## 2020-01-23 DIAGNOSIS — Z98.890 S/P FOOT SURGERY, RIGHT: ICD-10-CM

## 2020-01-23 DIAGNOSIS — M65.9 SYNOVITIS OF RIGHT FOOT: Primary | ICD-10-CM

## 2020-01-23 DIAGNOSIS — M89.8X7: ICD-10-CM

## 2020-01-23 DIAGNOSIS — M65.9 SYNOVITIS OF RIGHT ANKLE: ICD-10-CM

## 2020-01-23 DIAGNOSIS — S92.135P: ICD-10-CM

## 2020-01-23 PROCEDURE — 20600 DRAIN/INJ JOINT/BURSA W/O US: CPT | Performed by: PODIATRIST

## 2020-01-23 PROCEDURE — 99024 POSTOP FOLLOW-UP VISIT: CPT | Performed by: PODIATRIST

## 2020-01-23 RX ORDER — METHYLPREDNISOLONE ACETATE 40 MG/ML
40 INJECTION, SUSPENSION INTRA-ARTICULAR; INTRALESIONAL; INTRAMUSCULAR; SOFT TISSUE ONCE
Status: COMPLETED | OUTPATIENT
Start: 2020-01-23 | End: 2020-01-23

## 2020-01-23 RX ORDER — DEXAMETHASONE SODIUM PHOSPHATE 4 MG/ML
4 INJECTION, SOLUTION INTRA-ARTICULAR; INTRALESIONAL; INTRAMUSCULAR; INTRAVENOUS; SOFT TISSUE ONCE
Status: COMPLETED | OUTPATIENT
Start: 2020-01-23 | End: 2020-01-23

## 2020-01-23 RX ADMIN — DEXAMETHASONE SODIUM PHOSPHATE 4 MG: 4 INJECTION, SOLUTION INTRA-ARTICULAR; INTRALESIONAL; INTRAMUSCULAR; INTRAVENOUS; SOFT TISSUE at 13:26

## 2020-01-23 RX ADMIN — METHYLPREDNISOLONE ACETATE 10 MG: 40 INJECTION, SUSPENSION INTRA-ARTICULAR; INTRALESIONAL; INTRAMUSCULAR; SOFT TISSUE at 13:27

## 2020-02-06 ENCOUNTER — TELEPHONE (OUTPATIENT)
Dept: PODIATRY | Age: 35
End: 2020-02-06

## 2020-02-06 ENCOUNTER — WORKER'S COMP (OUTPATIENT)
Dept: PHYSICAL THERAPY | Age: 35
End: 2020-02-06

## 2020-02-06 DIAGNOSIS — S92.131P: ICD-10-CM

## 2020-02-06 PROCEDURE — 97014 ELECTRIC STIMULATION THERAPY: CPT | Performed by: PHYSICAL THERAPIST

## 2020-02-06 PROCEDURE — 97010 HOT OR COLD PACKS THERAPY: CPT | Performed by: PHYSICAL THERAPIST

## 2020-02-06 PROCEDURE — 97110 THERAPEUTIC EXERCISES: CPT | Performed by: PHYSICAL THERAPIST

## 2020-02-06 PROCEDURE — 97140 MANUAL THERAPY 1/> REGIONS: CPT | Performed by: PHYSICAL THERAPIST

## 2020-02-06 ASSESSMENT — ENCOUNTER SYMPTOMS: PAIN SEVERITY NOW: 7

## 2020-02-07 RX ORDER — DICLOFENAC SODIUM 75 MG/1
75 TABLET, DELAYED RELEASE ORAL 2 TIMES DAILY
Qty: 60 TABLET | Refills: 0 | Status: SHIPPED | OUTPATIENT
Start: 2020-02-07 | End: 2020-09-10 | Stop reason: ALTCHOICE

## 2020-02-10 ENCOUNTER — WORKER'S COMP (OUTPATIENT)
Dept: PHYSICAL THERAPY | Age: 35
End: 2020-02-10

## 2020-02-10 DIAGNOSIS — S92.131P: ICD-10-CM

## 2020-02-10 PROCEDURE — 97014 ELECTRIC STIMULATION THERAPY: CPT | Performed by: PHYSICAL THERAPIST

## 2020-02-10 PROCEDURE — 97010 HOT OR COLD PACKS THERAPY: CPT | Performed by: PHYSICAL THERAPIST

## 2020-02-10 PROCEDURE — 97140 MANUAL THERAPY 1/> REGIONS: CPT | Performed by: PHYSICAL THERAPIST

## 2020-02-10 PROCEDURE — 97112 NEUROMUSCULAR REEDUCATION: CPT | Performed by: PHYSICAL THERAPIST

## 2020-02-10 PROCEDURE — 97110 THERAPEUTIC EXERCISES: CPT | Performed by: PHYSICAL THERAPIST

## 2020-02-11 RX ORDER — METHYLPREDNISOLONE 4 MG/1
TABLET ORAL
Qty: 21 TABLET | Refills: 0 | Status: SHIPPED | OUTPATIENT
Start: 2020-02-11 | End: 2020-09-10 | Stop reason: ALTCHOICE

## 2020-02-27 ENCOUNTER — WORKER'S COMP (OUTPATIENT)
Dept: PODIATRY | Age: 35
End: 2020-02-27

## 2020-02-27 VITALS — BODY MASS INDEX: 35.55 KG/M2 | WEIGHT: 240 LBS | HEIGHT: 69 IN

## 2020-02-27 DIAGNOSIS — M89.8X7: ICD-10-CM

## 2020-02-27 DIAGNOSIS — M65.9 SYNOVITIS OF RIGHT ANKLE: Primary | ICD-10-CM

## 2020-02-27 DIAGNOSIS — R93.7 BONE MARROW EDEMA: ICD-10-CM

## 2020-02-27 DIAGNOSIS — Z98.890 S/P FOOT SURGERY, RIGHT: ICD-10-CM

## 2020-02-27 PROCEDURE — 20605 DRAIN/INJ JOINT/BURSA W/O US: CPT | Performed by: PODIATRIST

## 2020-02-27 PROCEDURE — 99213 OFFICE O/P EST LOW 20 MIN: CPT | Performed by: PODIATRIST

## 2020-02-27 RX ORDER — METHYLPREDNISOLONE ACETATE 40 MG/ML
10 INJECTION, SUSPENSION INTRA-ARTICULAR; INTRALESIONAL; INTRAMUSCULAR; SOFT TISSUE ONCE
Status: COMPLETED | OUTPATIENT
Start: 2020-02-27 | End: 2020-02-27

## 2020-02-27 RX ORDER — DEXAMETHASONE SODIUM PHOSPHATE 4 MG/ML
4 INJECTION, SOLUTION INTRA-ARTICULAR; INTRALESIONAL; INTRAMUSCULAR; INTRAVENOUS; SOFT TISSUE ONCE
Status: COMPLETED | OUTPATIENT
Start: 2020-02-27 | End: 2020-02-27

## 2020-02-27 RX ADMIN — METHYLPREDNISOLONE ACETATE 10 MG: 40 INJECTION, SUSPENSION INTRA-ARTICULAR; INTRALESIONAL; INTRAMUSCULAR; SOFT TISSUE at 12:01

## 2020-02-27 RX ADMIN — DEXAMETHASONE SODIUM PHOSPHATE 4 MG: 4 INJECTION, SOLUTION INTRA-ARTICULAR; INTRALESIONAL; INTRAMUSCULAR; INTRAVENOUS; SOFT TISSUE at 12:01

## 2020-02-27 SDOH — HEALTH STABILITY: MENTAL HEALTH: HOW OFTEN DO YOU HAVE A DRINK CONTAINING ALCOHOL?: NEVER

## 2020-03-09 ENCOUNTER — TELEPHONE (OUTPATIENT)
Dept: SCHEDULING | Age: 35
End: 2020-03-09

## 2020-03-09 DIAGNOSIS — M19.071 OSTEOARTHRITIS OF RIGHT SUBTALAR JOINT: ICD-10-CM

## 2020-03-09 DIAGNOSIS — Z98.890 S/P FOOT SURGERY, RIGHT: Primary | ICD-10-CM

## 2020-03-09 DIAGNOSIS — M65.9 SYNOVITIS OF ANKLE: ICD-10-CM

## 2020-04-01 ENCOUNTER — WORKER'S COMP (OUTPATIENT)
Dept: PODIATRY | Age: 35
End: 2020-04-01

## 2020-04-01 DIAGNOSIS — S92.131P: ICD-10-CM

## 2020-04-01 DIAGNOSIS — S90.31XD CONTUSION OF RIGHT FOOT, SUBSEQUENT ENCOUNTER: ICD-10-CM

## 2020-04-01 DIAGNOSIS — R93.7 BONE MARROW EDEMA: Primary | ICD-10-CM

## 2020-04-01 PROCEDURE — L1902 AFO ANKLE GAUNTLET PRE OTS: HCPCS

## 2020-04-01 PROCEDURE — 99214 OFFICE O/P EST MOD 30 MIN: CPT | Performed by: PODIATRIST

## 2020-04-01 RX ORDER — PREDNISONE 10 MG/1
TABLET ORAL
Qty: 105 TABLET | Refills: 0 | Status: SHIPPED | OUTPATIENT
Start: 2020-04-01 | End: 2020-09-10 | Stop reason: ALTCHOICE

## 2020-04-02 ENCOUNTER — APPOINTMENT (OUTPATIENT)
Dept: PODIATRY | Age: 35
End: 2020-04-02

## 2020-05-04 ENCOUNTER — E-ADVICE (OUTPATIENT)
Dept: PODIATRY | Age: 35
End: 2020-05-04

## 2020-05-04 ENCOUNTER — TELEPHONE (OUTPATIENT)
Dept: PODIATRY | Age: 35
End: 2020-05-04

## 2020-05-07 ENCOUNTER — TELEPHONE (OUTPATIENT)
Dept: PODIATRY | Age: 35
End: 2020-05-07

## 2020-05-07 ENCOUNTER — V-VISIT (OUTPATIENT)
Dept: PODIATRY | Age: 35
End: 2020-05-07

## 2020-05-07 DIAGNOSIS — M25.571 PAIN IN JOINT OF RIGHT FOOT: ICD-10-CM

## 2020-05-07 DIAGNOSIS — M65.9 SYNOVITIS OF RIGHT FOOT: ICD-10-CM

## 2020-05-07 DIAGNOSIS — S92.131P: ICD-10-CM

## 2020-05-07 DIAGNOSIS — R93.7 BONE MARROW EDEMA: Primary | ICD-10-CM

## 2020-05-07 DIAGNOSIS — M19.071 DJD (DEGENERATIVE JOINT DISEASE), ANKLE AND FOOT, RIGHT: ICD-10-CM

## 2020-05-07 PROCEDURE — 99213 OFFICE O/P EST LOW 20 MIN: CPT | Performed by: PODIATRIST

## 2020-05-22 ENCOUNTER — TELEPHONE (OUTPATIENT)
Dept: PODIATRY | Age: 35
End: 2020-05-22

## 2020-05-28 ENCOUNTER — TELEPHONE (OUTPATIENT)
Dept: SCHEDULING | Age: 35
End: 2020-05-28

## 2020-05-29 DIAGNOSIS — S92.135P: ICD-10-CM

## 2020-05-29 DIAGNOSIS — S92.131P: Primary | ICD-10-CM

## 2020-05-29 DIAGNOSIS — Z98.890 S/P FOOT SURGERY, RIGHT: ICD-10-CM

## 2020-06-01 ENCOUNTER — TELEPHONE (OUTPATIENT)
Dept: SCHEDULING | Age: 35
End: 2020-06-01

## 2020-06-19 ENCOUNTER — HOSPITAL ENCOUNTER (OUTPATIENT)
Age: 35
Discharge: HOME | End: 2020-06-19
Attending: FAMILY MEDICINE
Payer: MEDICAID

## 2020-06-19 VITALS
RESPIRATION RATE: 16 BRPM | SYSTOLIC BLOOD PRESSURE: 114 MMHG | TEMPERATURE: 98 F | DIASTOLIC BLOOD PRESSURE: 83 MMHG | OXYGEN SATURATION: 98 % | HEART RATE: 72 BPM

## 2020-06-19 DIAGNOSIS — H60.391 OTITIS, EXTERNA, INFECTIVE, RIGHT: ICD-10-CM

## 2020-06-19 DIAGNOSIS — H61.23 BILATERAL HEARING LOSS DUE TO CERUMEN IMPACTION: Primary | ICD-10-CM

## 2020-06-19 PROCEDURE — 99213 OFFICE O/P EST LOW 20 MIN: CPT

## 2020-06-19 PROCEDURE — 99214 OFFICE O/P EST MOD 30 MIN: CPT

## 2020-06-19 PROCEDURE — 69209 REMOVE IMPACTED EAR WAX UNI: CPT

## 2020-06-19 RX ORDER — NEOMYCIN SULFATE, POLYMYXIN B SULFATE AND HYDROCORTISONE 10; 3.5; 1 MG/ML; MG/ML; [USP'U]/ML
4 SUSPENSION/ DROPS AURICULAR (OTIC) 3 TIMES DAILY
Qty: 10 ML | Refills: 0 | Status: SHIPPED | OUTPATIENT
Start: 2020-06-19 | End: 2020-06-26

## 2020-06-19 RX ORDER — IBUPROFEN 400 MG/1
400 TABLET ORAL EVERY 6 HOURS PRN
COMMUNITY
End: 2021-03-04 | Stop reason: ALTCHOICE

## 2020-06-19 NOTE — ED INITIAL ASSESSMENT (HPI)
Pt sts noted decreased hearing bilaterally 4-5 days ago. Developed pain to right ear 3 days ago. Denies cold symptoms or drng from ears.

## 2020-06-19 NOTE — ED PROVIDER NOTES
Patient Seen in: [address] [facility] [place]      History   Patient presents with:  Ear Problem Pain    Stated Complaint: ear pain    HPI  29 yo M here with complaints of ear pain   Noticed that he was not able to hear like he feels like he is under Bilateral 9/1/2015    Performed by America Nair MD at [address]   • TRANSFORAMINAL LUMBAR EPIDURAL STEROID INJECTION MULTIPLE LEVEL AND BILATERAL Bilateral 8/24/2015    Performed by America Nair MD at [facility] MAIN OR                Family history rev Dispense:  10 mL          Refill:  0    Patient verbalized understanding and agreed with the plan. MDM       · No Qtips in ear canals. · Use 3-4 drops of olive oil or mineral in each ear canal once weekly to help keep the wax soft.  Do not start this

## 2020-07-07 ENCOUNTER — TELEPHONE (OUTPATIENT)
Dept: PODIATRY | Age: 35
End: 2020-07-07

## 2020-07-14 ENCOUNTER — TELEPHONE (OUTPATIENT)
Dept: PODIATRY | Age: 35
End: 2020-07-14

## 2020-07-14 ENCOUNTER — TELEPHONE (OUTPATIENT)
Dept: INTERNAL MEDICINE | Age: 35
End: 2020-07-14

## 2020-07-14 DIAGNOSIS — M65.879 OTHER SYNOVITIS AND TENOSYNOVITIS, UNSPECIFIED ANKLE AND FOOT: Primary | ICD-10-CM

## 2020-07-15 ENCOUNTER — LAB SERVICES (OUTPATIENT)
Dept: LAB | Age: 35
End: 2020-07-15

## 2020-07-15 DIAGNOSIS — M65.879 OTHER SYNOVITIS AND TENOSYNOVITIS, UNSPECIFIED ANKLE AND FOOT: Primary | ICD-10-CM

## 2020-07-15 LAB
BASOPHIL %: 0.2 % (ref 0–1.2)
BASOPHIL ABSOLUTE #: 0 10*3/UL (ref 0–0.1)
BUN SERPL-MCNC: 13 MG/DL (ref 6–27)
CALCIUM SERPL-MCNC: 9.2 MG/DL (ref 8.6–10.6)
CHLORIDE SERPL-SCNC: 103 MMOL/L (ref 96–107)
CREAT SERPL-MCNC: 1 MG/DL (ref 0.6–1.6)
DIFFERENTIAL TYPE: NORMAL
EOSINOPHIL %: 1.9 % (ref 0–10)
EOSINOPHIL ABSOLUTE #: 0.1 10*3/UL (ref 0–0.5)
GFR SERPL CREATININE-BSD FRML MDRD: >60 ML/MIN/{1.73M2}
GFR SERPL CREATININE-BSD FRML MDRD: >60 ML/MIN/{1.73M2}
GLUCOSE P FAST SERPL-MCNC: 101 MG/DL (ref 60–100)
HCO3 SERPL-SCNC: 28 MMOL/L (ref 22–32)
HEMATOCRIT: 45 % (ref 40–51)
HEMOGLOBIN: 15.3 G/DL (ref 13.7–17.5)
LYMPH PERCENT: 29.7 % (ref 20.5–51.1)
LYMPHOCYTE ABSOLUTE #: 1.9 10*3/UL (ref 1.2–3.4)
MEAN CORPUSCULAR HGB CONCENTRATION: 34 % (ref 32–36)
MEAN CORPUSCULAR HGB: 30 PG (ref 27–34)
MEAN CORPUSCULAR VOLUME: 88.2 FL (ref 79–95)
MEAN PLATELET VOLUME: 10.2 FL (ref 8.6–12.4)
MONOCYTE ABSOLUTE #: 0.6 10*3/UL (ref 0.2–0.9)
MONOCYTE PERCENT: 8.9 % (ref 4.3–12.9)
NEUTROPHIL ABSOLUTE #: 3.8 10*3/UL (ref 1.4–6.5)
NEUTROPHIL PERCENT: 59.3 % (ref 34–73.5)
PLATELET COUNT: 205 10*3/UL (ref 150–400)
POTASSIUM SERPL-SCNC: 4.4 MMOL/L (ref 3.5–5.3)
RED BLOOD CELL COUNT: 5.1 10*6/UL (ref 3.9–5.7)
RED CELL DISTRIBUTION WIDTH: 13.2 % (ref 11.3–14.8)
SODIUM SERPL-SCNC: 137 MMOL/L (ref 136–146)
WHITE BLOOD CELL COUNT: 6.4 10*3/UL (ref 4–10)

## 2020-07-15 PROCEDURE — 80048 BASIC METABOLIC PNL TOTAL CA: CPT | Performed by: FAMILY MEDICINE

## 2020-07-15 PROCEDURE — 36415 COLL VENOUS BLD VENIPUNCTURE: CPT | Performed by: FAMILY MEDICINE

## 2020-07-15 PROCEDURE — 85025 COMPLETE CBC W/AUTO DIFF WBC: CPT | Performed by: FAMILY MEDICINE

## 2020-07-18 ENCOUNTER — APPOINTMENT (OUTPATIENT)
Dept: FAMILY MEDICINE | Age: 35
End: 2020-07-18

## 2020-07-21 ENCOUNTER — TELEPHONE (OUTPATIENT)
Dept: INTERNAL MEDICINE | Age: 35
End: 2020-07-21

## 2020-07-22 ENCOUNTER — OFFICE VISIT (OUTPATIENT)
Dept: FAMILY MEDICINE | Age: 35
End: 2020-07-22

## 2020-07-22 VITALS
DIASTOLIC BLOOD PRESSURE: 80 MMHG | HEIGHT: 69 IN | WEIGHT: 261 LBS | SYSTOLIC BLOOD PRESSURE: 116 MMHG | BODY MASS INDEX: 38.66 KG/M2 | HEART RATE: 95 BPM

## 2020-07-22 DIAGNOSIS — M25.571 CHRONIC PAIN OF RIGHT ANKLE: ICD-10-CM

## 2020-07-22 DIAGNOSIS — G89.29 CHRONIC PAIN OF RIGHT ANKLE: ICD-10-CM

## 2020-07-22 DIAGNOSIS — E66.9 OBESITY (BMI 30-39.9): ICD-10-CM

## 2020-07-22 DIAGNOSIS — Z01.818 ENCOUNTER FOR OTHER PREPROCEDURAL EXAMINATION: Primary | ICD-10-CM

## 2020-07-22 PROBLEM — F31.9 DEPRESSED BIPOLAR I DISORDER (CMD): Status: ACTIVE | Noted: 2020-07-22

## 2020-07-22 PROCEDURE — 99243 OFF/OP CNSLTJ NEW/EST LOW 30: CPT | Performed by: FAMILY MEDICINE

## 2020-07-22 RX ORDER — QUETIAPINE FUMARATE 200 MG/1
TABLET, FILM COATED ORAL
COMMUNITY
Start: 2020-06-17 | End: 2021-01-25 | Stop reason: ALTCHOICE

## 2020-07-22 RX ORDER — ALPRAZOLAM 1 MG/1
TABLET ORAL
COMMUNITY
Start: 2020-07-16 | End: 2022-07-27 | Stop reason: DRUGHIGH

## 2020-07-22 ASSESSMENT — PATIENT HEALTH QUESTIONNAIRE - PHQ9
SUM OF ALL RESPONSES TO PHQ9 QUESTIONS 1 AND 2: 0
2. FEELING DOWN, DEPRESSED OR HOPELESS: NOT AT ALL
CLINICAL INTERPRETATION OF PHQ2 SCORE: NO FURTHER SCREENING NEEDED
SUM OF ALL RESPONSES TO PHQ9 QUESTIONS 1 AND 2: 0
1. LITTLE INTEREST OR PLEASURE IN DOING THINGS: NOT AT ALL
CLINICAL INTERPRETATION OF PHQ9 SCORE: NO FURTHER SCREENING NEEDED

## 2020-07-22 ASSESSMENT — ENCOUNTER SYMPTOMS
NUMBNESS: 0
ABDOMINAL PAIN: 0
UNEXPECTED WEIGHT CHANGE: 0
VOMITING: 0
SHORTNESS OF BREATH: 0
BRUISES/BLEEDS EASILY: 0
DIARRHEA: 0
COUGH: 0
ADENOPATHY: 0
HEADACHES: 0
LIGHT-HEADEDNESS: 0
CHILLS: 0
FATIGUE: 0
CONSTIPATION: 0
NAUSEA: 0
WEAKNESS: 0
WHEEZING: 0
DIZZINESS: 0
TROUBLE SWALLOWING: 0

## 2020-07-23 ENCOUNTER — TELEPHONE (OUTPATIENT)
Dept: PODIATRY | Age: 35
End: 2020-07-23

## 2020-07-24 ENCOUNTER — LAB SERVICES (OUTPATIENT)
Dept: LAB | Age: 35
End: 2020-07-24

## 2020-07-24 DIAGNOSIS — M65.879 OTHER SYNOVITIS AND TENOSYNOVITIS, UNSPECIFIED ANKLE AND FOOT: ICD-10-CM

## 2020-07-24 PROCEDURE — 87635 SARS-COV-2 COVID-19 AMP PRB: CPT | Performed by: PODIATRIST

## 2020-07-25 LAB
SARS-COV-2 RNA RESP QL NAA+PROBE: NOT DETECTED
SERVICE CMNT-IMP: NORMAL
SPECIMEN SOURCE: NORMAL

## 2020-07-28 ENCOUNTER — IMAGING SERVICES (OUTPATIENT)
Dept: GENERAL RADIOLOGY | Age: 35
End: 2020-07-28
Attending: PODIATRIST

## 2020-07-28 ENCOUNTER — OFFICE VISIT (OUTPATIENT)
Dept: PODIATRY | Age: 35
End: 2020-07-28

## 2020-07-28 DIAGNOSIS — M19.071 ARTHRITIS OF RIGHT FOOT: ICD-10-CM

## 2020-07-28 DIAGNOSIS — M19.071 DJD (DEGENERATIVE JOINT DISEASE), ANKLE AND FOOT, RIGHT: Primary | ICD-10-CM

## 2020-07-28 DIAGNOSIS — M65.9 SYNOVITIS OF RIGHT ANKLE: ICD-10-CM

## 2020-07-28 PROCEDURE — 73630 X-RAY EXAM OF FOOT: CPT | Performed by: RADIOLOGY

## 2020-07-28 PROCEDURE — 20900 REMOVAL OF BONE FOR GRAFT: CPT | Performed by: PODIATRIST

## 2020-07-28 PROCEDURE — 28725 ARTHRODESIS SUBTALAR: CPT | Performed by: PODIATRIST

## 2020-08-04 ENCOUNTER — TELEPHONE (OUTPATIENT)
Dept: PODIATRY | Age: 35
End: 2020-08-04

## 2020-08-05 ENCOUNTER — WORKER'S COMP (OUTPATIENT)
Dept: PODIATRY | Age: 35
End: 2020-08-05

## 2020-08-05 DIAGNOSIS — M19.071 DJD (DEGENERATIVE JOINT DISEASE), ANKLE AND FOOT, RIGHT: ICD-10-CM

## 2020-08-05 DIAGNOSIS — M65.9 SYNOVITIS OF RIGHT ANKLE: ICD-10-CM

## 2020-08-05 DIAGNOSIS — Z98.890 S/P FOOT SURGERY, RIGHT: Primary | ICD-10-CM

## 2020-08-05 PROCEDURE — 99024 POSTOP FOLLOW-UP VISIT: CPT | Performed by: NURSE PRACTITIONER

## 2020-08-05 RX ORDER — TRAMADOL HYDROCHLORIDE 50 MG/1
50 TABLET ORAL ONCE
Status: CANCELLED | OUTPATIENT
Start: 2020-08-05 | End: 2020-08-05

## 2020-08-05 RX ORDER — HYDROCODONE BITARTRATE AND ACETAMINOPHEN 10; 325 MG/1; MG/1
TABLET ORAL
COMMUNITY
Start: 2020-07-28 | End: 2020-08-13 | Stop reason: DRUGHIGH

## 2020-08-05 RX ORDER — PREDNISOLONE SODIUM PHOSPHATE 15 MG/5ML
SOLUTION ORAL
COMMUNITY
Start: 2020-07-30 | End: 2020-09-10 | Stop reason: ALTCHOICE

## 2020-08-05 SDOH — HEALTH STABILITY: MENTAL HEALTH: HOW OFTEN DO YOU HAVE A DRINK CONTAINING ALCOHOL?: NEVER

## 2020-08-06 ENCOUNTER — TELEPHONE (OUTPATIENT)
Dept: SCHEDULING | Age: 35
End: 2020-08-06

## 2020-08-07 ENCOUNTER — TELEPHONE (OUTPATIENT)
Dept: PODIATRY | Age: 35
End: 2020-08-07

## 2020-08-13 ENCOUNTER — WORKER'S COMP (OUTPATIENT)
Dept: PODIATRY | Age: 35
End: 2020-08-13

## 2020-08-13 DIAGNOSIS — M65.9 SYNOVITIS OF RIGHT ANKLE: ICD-10-CM

## 2020-08-13 DIAGNOSIS — Z98.890 S/P FOOT SURGERY, RIGHT: Primary | ICD-10-CM

## 2020-08-13 DIAGNOSIS — M19.071 DJD (DEGENERATIVE JOINT DISEASE), ANKLE AND FOOT, RIGHT: ICD-10-CM

## 2020-08-13 PROCEDURE — 99024 POSTOP FOLLOW-UP VISIT: CPT | Performed by: NURSE PRACTITIONER

## 2020-08-13 PROCEDURE — 29405 APPL SHORT LEG CAST: CPT | Performed by: NURSE PRACTITIONER

## 2020-08-13 RX ORDER — HYDROCODONE BITARTRATE AND ACETAMINOPHEN 5; 325 MG/1; MG/1
TABLET ORAL
Qty: 30 TABLET | Refills: 0 | Status: SHIPPED | OUTPATIENT
Start: 2020-08-13 | End: 2020-09-10 | Stop reason: ALTCHOICE

## 2020-08-13 RX ORDER — TRAMADOL HYDROCHLORIDE 50 MG/1
TABLET ORAL
COMMUNITY
Start: 2020-08-05 | End: 2020-09-10 | Stop reason: ALTCHOICE

## 2020-08-13 SDOH — HEALTH STABILITY: MENTAL HEALTH: HOW OFTEN DO YOU HAVE A DRINK CONTAINING ALCOHOL?: NEVER

## 2020-08-27 ENCOUNTER — WORKER'S COMP (OUTPATIENT)
Dept: PODIATRY | Age: 35
End: 2020-08-27

## 2020-08-27 ENCOUNTER — TELEPHONE (OUTPATIENT)
Dept: PODIATRY | Age: 35
End: 2020-08-27

## 2020-08-27 DIAGNOSIS — M19.071 DJD (DEGENERATIVE JOINT DISEASE), ANKLE AND FOOT, RIGHT: ICD-10-CM

## 2020-08-27 DIAGNOSIS — M79.661 PAIN OF RIGHT CALF: Primary | ICD-10-CM

## 2020-08-27 DIAGNOSIS — M65.9 SYNOVITIS OF RIGHT ANKLE: ICD-10-CM

## 2020-08-27 DIAGNOSIS — Z98.890 S/P FOOT SURGERY, RIGHT: ICD-10-CM

## 2020-08-27 PROCEDURE — L4386 NON-PNEUM WALK BOOT PRE CST: HCPCS | Performed by: NURSE PRACTITIONER

## 2020-08-27 PROCEDURE — 99212 OFFICE O/P EST SF 10 MIN: CPT | Performed by: NURSE PRACTITIONER

## 2020-08-27 PROCEDURE — 99024 POSTOP FOLLOW-UP VISIT: CPT | Performed by: NURSE PRACTITIONER

## 2020-08-31 ENCOUNTER — TELEPHONE (OUTPATIENT)
Dept: PODIATRY | Age: 35
End: 2020-08-31

## 2020-08-31 DIAGNOSIS — Z98.890 S/P FOOT SURGERY, RIGHT: ICD-10-CM

## 2020-08-31 DIAGNOSIS — M79.661 PAIN OF RIGHT CALF: ICD-10-CM

## 2020-09-09 ENCOUNTER — TELEPHONE (OUTPATIENT)
Dept: PODIATRY | Age: 35
End: 2020-09-09

## 2020-09-10 ENCOUNTER — WORKER'S COMP (OUTPATIENT)
Dept: PODIATRY | Age: 35
End: 2020-09-10

## 2020-09-10 DIAGNOSIS — Z98.890 S/P FOOT SURGERY, RIGHT: Primary | ICD-10-CM

## 2020-09-10 DIAGNOSIS — M65.879 OTHER SYNOVITIS AND TENOSYNOVITIS, UNSPECIFIED ANKLE AND FOOT: ICD-10-CM

## 2020-09-10 DIAGNOSIS — M19.071 DJD (DEGENERATIVE JOINT DISEASE), ANKLE AND FOOT, RIGHT: ICD-10-CM

## 2020-09-10 DIAGNOSIS — M65.9 SYNOVITIS OF RIGHT ANKLE: ICD-10-CM

## 2020-09-10 DIAGNOSIS — R93.7 BONE MARROW EDEMA: ICD-10-CM

## 2020-09-10 PROCEDURE — 99024 POSTOP FOLLOW-UP VISIT: CPT | Performed by: PODIATRIST

## 2020-09-10 RX ORDER — ASPIRIN 81 MG/1
81 TABLET, CHEWABLE ORAL DAILY
COMMUNITY
End: 2020-12-17 | Stop reason: ALTCHOICE

## 2020-09-10 RX ORDER — IBUPROFEN 200 MG
200 TABLET ORAL EVERY 6 HOURS PRN
COMMUNITY
End: 2022-07-08 | Stop reason: SDUPTHER

## 2020-09-10 SDOH — HEALTH STABILITY: MENTAL HEALTH: HOW OFTEN DO YOU HAVE A DRINK CONTAINING ALCOHOL?: NEVER

## 2020-10-01 ENCOUNTER — TELEPHONE (OUTPATIENT)
Dept: PODIATRY | Age: 35
End: 2020-10-01

## 2020-10-01 ENCOUNTER — IMAGING SERVICES (OUTPATIENT)
Dept: GENERAL RADIOLOGY | Age: 35
End: 2020-10-01
Attending: PODIATRIST

## 2020-10-01 ENCOUNTER — WORKER'S COMP (OUTPATIENT)
Dept: PODIATRY | Age: 35
End: 2020-10-01

## 2020-10-01 DIAGNOSIS — Z98.890 S/P FOOT SURGERY, RIGHT: ICD-10-CM

## 2020-10-01 DIAGNOSIS — M65.9 SYNOVITIS OF RIGHT ANKLE: ICD-10-CM

## 2020-10-01 DIAGNOSIS — Z98.890 S/P FOOT SURGERY, RIGHT: Primary | ICD-10-CM

## 2020-10-01 DIAGNOSIS — M19.071 DJD (DEGENERATIVE JOINT DISEASE), ANKLE AND FOOT, RIGHT: ICD-10-CM

## 2020-10-01 PROCEDURE — 99024 POSTOP FOLLOW-UP VISIT: CPT | Performed by: PODIATRIST

## 2020-10-01 PROCEDURE — 73630 X-RAY EXAM OF FOOT: CPT | Performed by: PODIATRIST

## 2020-10-01 PROCEDURE — 73610 X-RAY EXAM OF ANKLE: CPT | Performed by: PODIATRIST

## 2020-10-01 PROCEDURE — E0105 CANE ADJUST/FIXED QUAD/3 PRO: HCPCS

## 2020-10-02 ENCOUNTER — APPOINTMENT (OUTPATIENT)
Dept: PODIATRY | Age: 35
End: 2020-10-02

## 2020-10-13 ENCOUNTER — WORKER'S COMP (OUTPATIENT)
Dept: PHYSICAL THERAPY | Age: 35
End: 2020-10-13

## 2020-10-13 DIAGNOSIS — Z98.1 STATUS POST ANKLE FUSION: ICD-10-CM

## 2020-10-13 DIAGNOSIS — M25.571 ACUTE RIGHT ANKLE PAIN: Primary | ICD-10-CM

## 2020-10-13 PROCEDURE — 97162 PT EVAL MOD COMPLEX 30 MIN: CPT | Performed by: PHYSICAL THERAPIST

## 2020-10-13 PROCEDURE — 97110 THERAPEUTIC EXERCISES: CPT | Performed by: PHYSICAL THERAPIST

## 2020-10-13 ASSESSMENT — MOVEMENT AND STRENGTH ASSESSMENTS
GETTING INTO OR OUT OF THE BATH: QUITE A BIT OF DIFFICULTY
PERFORMING LIGHT ACTIVITES AROUND YOUR HOME: EXTREME DIFFICULTY OR UNABLE TO PERFORM ACTIVITY
WALKING A MILE: EXTREME DIFFICULTY OR UNABLE TO PERFORM ACTIVITY
HOPPING: EXTREME DIFFICULTY OR UNABLE TO PERFORM ACTIVITY
ANY OF YOUR USUAL WORK, HOUSEWORK OR SCHOOL ACTIVITIES: EXTREME DIFFICULTY OR UNABLE TO PERFORM ACTIVITY
WALKING 2 BLOCKS: EXTREME DIFFICULTY OR UNABLE TO PERFORM ACTIVITY
PERFORMING HEAVY ACTIVITIES AROUND YOUR HOME: EXTREME DIFFICULTY OR UNABLE TO PERFORM ACTIVITY
WALKING BETWEEN ROOMS: QUITE A BIT OF DIFFICULTY
TOTAL SCORE: 10
GOING UP OR DOWN 10 STAIRS (ABOUT 1 FLIGHT OF STAIRS): EXTREME DIFFICULTY OR UNABLE TO PERFORM ACTIVITY
LIFTING AN OBJECT, LIKE A BAG OF GROCERIES, FROM THE FLOOR: EXTREME DIFFICULTY OR UNABLE TO PERFORM ACTIVITY
GETTING INTO OR OUT OF A CAR: QUITE A BIT OF DIFFICULTY
MAKING SHARP TURNS WHILE RUNNING FAST: EXTREME DIFFICULTY OR UNABLE TO PERFORM ACTIVITY
SQUATTING: EXTREME DIFFICULTY OR UNABLE TO PERFORM ACTIVITY
RUNNING ON EVEN GROUND: EXTREME DIFFICULTY OR UNABLE TO PERFORM ACTIVITY
YOUR USUAL HOBBIES, RECREATIONAL OR SPORTING ACTIVIITIES: EXTREME DIFFICULTY OR UNABLE TO PERFORM ACTIVITY
ROLLING OVER IN BED: A LITTLE BIT OF DIFFICULTY
RUNNING ON UNEVEN GROUND: EXTREME DIFFICULTY OR UNABLE TO PERFORM ACTIVITY
STANDING FOR 1 HOUR: EXTREME DIFFICULTY OR UNABLE TO PERFORM ACTIVITY
SITTING FOR 1 HOUR: EXTREME DIFFICULTY OR UNABLE TO PERFORM ACTIVITY
PUTTING ON YOUR SHOES OR SOCKS: MODERATE DIFFICULTY

## 2020-10-13 ASSESSMENT — ENCOUNTER SYMPTOMS
PAIN SCALE AT HIGHEST: 7
PAIN SEVERITY NOW: 5

## 2020-10-14 ENCOUNTER — TELEPHONE (OUTPATIENT)
Dept: PHYSICAL THERAPY | Age: 35
End: 2020-10-14

## 2020-10-20 ENCOUNTER — WORKER'S COMP (OUTPATIENT)
Dept: PHYSICAL THERAPY | Age: 35
End: 2020-10-20

## 2020-10-20 DIAGNOSIS — Z98.1 STATUS POST ANKLE FUSION: ICD-10-CM

## 2020-10-20 DIAGNOSIS — M25.571 ACUTE RIGHT ANKLE PAIN: Primary | ICD-10-CM

## 2020-10-20 PROCEDURE — 97010 HOT OR COLD PACKS THERAPY: CPT | Performed by: PHYSICAL THERAPIST

## 2020-10-20 PROCEDURE — 97014 ELECTRIC STIMULATION THERAPY: CPT | Performed by: PHYSICAL THERAPIST

## 2020-10-20 PROCEDURE — 97110 THERAPEUTIC EXERCISES: CPT | Performed by: PHYSICAL THERAPIST

## 2020-10-20 PROCEDURE — 97140 MANUAL THERAPY 1/> REGIONS: CPT | Performed by: PHYSICAL THERAPIST

## 2020-10-21 ENCOUNTER — TELEPHONE (OUTPATIENT)
Dept: PODIATRY | Age: 35
End: 2020-10-21

## 2020-10-21 DIAGNOSIS — Z98.890 S/P FOOT SURGERY, RIGHT: Primary | ICD-10-CM

## 2020-10-21 RX ORDER — HYDROCODONE BITARTRATE AND ACETAMINOPHEN 5; 325 MG/1; MG/1
1 TABLET ORAL EVERY 8 HOURS PRN
Qty: 20 TABLET | Refills: 0 | Status: SHIPPED | OUTPATIENT
Start: 2020-10-21 | End: 2020-12-02 | Stop reason: SDUPTHER

## 2020-10-22 ENCOUNTER — WORKER'S COMP (OUTPATIENT)
Dept: PHYSICAL THERAPY | Age: 35
End: 2020-10-22

## 2020-10-22 DIAGNOSIS — Z98.1 STATUS POST ANKLE FUSION: ICD-10-CM

## 2020-10-22 DIAGNOSIS — M25.571 RIGHT ANKLE PAIN, UNSPECIFIED CHRONICITY: Primary | ICD-10-CM

## 2020-10-22 PROCEDURE — 97112 NEUROMUSCULAR REEDUCATION: CPT | Performed by: PHYSICAL THERAPIST

## 2020-10-22 PROCEDURE — 97140 MANUAL THERAPY 1/> REGIONS: CPT | Performed by: PHYSICAL THERAPIST

## 2020-10-22 PROCEDURE — 97110 THERAPEUTIC EXERCISES: CPT | Performed by: PHYSICAL THERAPIST

## 2020-10-26 ENCOUNTER — WORKER'S COMP (OUTPATIENT)
Dept: PHYSICAL THERAPY | Age: 35
End: 2020-10-26

## 2020-10-26 DIAGNOSIS — Z98.1 STATUS POST ANKLE FUSION: ICD-10-CM

## 2020-10-26 DIAGNOSIS — M25.571 ACUTE RIGHT ANKLE PAIN: Primary | ICD-10-CM

## 2020-10-26 PROCEDURE — 97112 NEUROMUSCULAR REEDUCATION: CPT | Performed by: PHYSICAL THERAPIST

## 2020-10-26 PROCEDURE — 97140 MANUAL THERAPY 1/> REGIONS: CPT | Performed by: PHYSICAL THERAPIST

## 2020-10-26 PROCEDURE — 97110 THERAPEUTIC EXERCISES: CPT | Performed by: PHYSICAL THERAPIST

## 2020-10-28 ENCOUNTER — WORKER'S COMP (OUTPATIENT)
Dept: PHYSICAL THERAPY | Age: 35
End: 2020-10-28

## 2020-10-28 DIAGNOSIS — M25.571 ACUTE RIGHT ANKLE PAIN: Primary | ICD-10-CM

## 2020-10-28 DIAGNOSIS — Z98.1 STATUS POST ANKLE FUSION: ICD-10-CM

## 2020-10-28 PROCEDURE — 97110 THERAPEUTIC EXERCISES: CPT | Performed by: PHYSICAL THERAPIST

## 2020-10-28 PROCEDURE — 97140 MANUAL THERAPY 1/> REGIONS: CPT | Performed by: PHYSICAL THERAPIST

## 2020-10-28 PROCEDURE — 97112 NEUROMUSCULAR REEDUCATION: CPT | Performed by: PHYSICAL THERAPIST

## 2020-11-02 ENCOUNTER — WORKER'S COMP (OUTPATIENT)
Dept: PHYSICAL THERAPY | Age: 35
End: 2020-11-02

## 2020-11-02 DIAGNOSIS — Z98.1 STATUS POST ANKLE FUSION: ICD-10-CM

## 2020-11-02 DIAGNOSIS — M25.571 ACUTE RIGHT ANKLE PAIN: Primary | ICD-10-CM

## 2020-11-02 PROCEDURE — 97140 MANUAL THERAPY 1/> REGIONS: CPT | Performed by: PHYSICAL THERAPIST

## 2020-11-02 PROCEDURE — 97112 NEUROMUSCULAR REEDUCATION: CPT | Performed by: PHYSICAL THERAPIST

## 2020-11-02 PROCEDURE — 97110 THERAPEUTIC EXERCISES: CPT | Performed by: PHYSICAL THERAPIST

## 2020-11-04 ENCOUNTER — TELEPHONE (OUTPATIENT)
Dept: PODIATRY | Age: 35
End: 2020-11-04

## 2020-11-05 ENCOUNTER — IMAGING SERVICES (OUTPATIENT)
Dept: GENERAL RADIOLOGY | Age: 35
End: 2020-11-05
Attending: PODIATRIST

## 2020-11-05 ENCOUNTER — WORKER'S COMP (OUTPATIENT)
Dept: PHYSICAL THERAPY | Age: 35
End: 2020-11-05

## 2020-11-05 ENCOUNTER — WORKER'S COMP (OUTPATIENT)
Dept: PODIATRY | Age: 35
End: 2020-11-05

## 2020-11-05 DIAGNOSIS — M65.879 OTHER SYNOVITIS AND TENOSYNOVITIS, UNSPECIFIED ANKLE AND FOOT: ICD-10-CM

## 2020-11-05 DIAGNOSIS — M19.071 DJD (DEGENERATIVE JOINT DISEASE), ANKLE AND FOOT, RIGHT: ICD-10-CM

## 2020-11-05 DIAGNOSIS — Z98.890 S/P FOOT SURGERY, RIGHT: ICD-10-CM

## 2020-11-05 DIAGNOSIS — Z98.1 STATUS POST ANKLE FUSION: ICD-10-CM

## 2020-11-05 DIAGNOSIS — Z98.890 S/P FOOT SURGERY, RIGHT: Primary | ICD-10-CM

## 2020-11-05 DIAGNOSIS — M65.9 SYNOVITIS OF RIGHT ANKLE: ICD-10-CM

## 2020-11-05 DIAGNOSIS — M25.571 ACUTE RIGHT ANKLE PAIN: Primary | ICD-10-CM

## 2020-11-05 PROCEDURE — 97110 THERAPEUTIC EXERCISES: CPT | Performed by: PHYSICAL THERAPIST

## 2020-11-05 PROCEDURE — 99024 POSTOP FOLLOW-UP VISIT: CPT | Performed by: PODIATRIST

## 2020-11-05 PROCEDURE — 97112 NEUROMUSCULAR REEDUCATION: CPT | Performed by: PHYSICAL THERAPIST

## 2020-11-05 PROCEDURE — 97140 MANUAL THERAPY 1/> REGIONS: CPT | Performed by: PHYSICAL THERAPIST

## 2020-11-05 PROCEDURE — 73610 X-RAY EXAM OF ANKLE: CPT | Performed by: PODIATRIST

## 2020-11-05 PROCEDURE — 73630 X-RAY EXAM OF FOOT: CPT | Performed by: PODIATRIST

## 2020-11-05 SDOH — HEALTH STABILITY: MENTAL HEALTH: HOW OFTEN DO YOU HAVE A DRINK CONTAINING ALCOHOL?: NEVER

## 2020-11-09 ENCOUNTER — WORKER'S COMP (OUTPATIENT)
Dept: PHYSICAL THERAPY | Age: 35
End: 2020-11-09

## 2020-11-09 DIAGNOSIS — Z98.1 STATUS POST ANKLE FUSION: ICD-10-CM

## 2020-11-09 DIAGNOSIS — M25.571 ACUTE RIGHT ANKLE PAIN: Primary | ICD-10-CM

## 2020-11-09 PROCEDURE — 97112 NEUROMUSCULAR REEDUCATION: CPT | Performed by: PHYSICAL THERAPIST

## 2020-11-09 PROCEDURE — 97140 MANUAL THERAPY 1/> REGIONS: CPT | Performed by: PHYSICAL THERAPIST

## 2020-11-09 PROCEDURE — 97110 THERAPEUTIC EXERCISES: CPT | Performed by: PHYSICAL THERAPIST

## 2020-11-11 ENCOUNTER — TELEPHONE (OUTPATIENT)
Dept: PODIATRY | Age: 35
End: 2020-11-11

## 2020-11-11 ENCOUNTER — TELEPHONE (OUTPATIENT)
Dept: PHYSICAL THERAPY | Age: 35
End: 2020-11-11

## 2020-11-11 ENCOUNTER — WORKER'S COMP (OUTPATIENT)
Dept: PHYSICAL THERAPY | Age: 35
End: 2020-11-11

## 2020-11-11 DIAGNOSIS — Z98.890 S/P FOOT SURGERY, RIGHT: Primary | ICD-10-CM

## 2020-11-11 DIAGNOSIS — Z98.1 STATUS POST ANKLE FUSION: ICD-10-CM

## 2020-11-11 DIAGNOSIS — M25.571 ACUTE RIGHT ANKLE PAIN: Primary | ICD-10-CM

## 2020-11-11 PROCEDURE — 97112 NEUROMUSCULAR REEDUCATION: CPT | Performed by: PHYSICAL THERAPIST

## 2020-11-11 PROCEDURE — 97110 THERAPEUTIC EXERCISES: CPT | Performed by: PHYSICAL THERAPIST

## 2020-11-11 PROCEDURE — 97140 MANUAL THERAPY 1/> REGIONS: CPT | Performed by: PHYSICAL THERAPIST

## 2020-11-16 ENCOUNTER — WORKER'S COMP (OUTPATIENT)
Dept: PHYSICAL THERAPY | Age: 35
End: 2020-11-16

## 2020-11-16 DIAGNOSIS — M25.571 ACUTE RIGHT ANKLE PAIN: Primary | ICD-10-CM

## 2020-11-16 DIAGNOSIS — Z98.1 STATUS POST ANKLE FUSION: ICD-10-CM

## 2020-11-16 PROCEDURE — 97112 NEUROMUSCULAR REEDUCATION: CPT | Performed by: PHYSICAL THERAPIST

## 2020-11-16 PROCEDURE — 97140 MANUAL THERAPY 1/> REGIONS: CPT | Performed by: PHYSICAL THERAPIST

## 2020-11-16 PROCEDURE — 97110 THERAPEUTIC EXERCISES: CPT | Performed by: PHYSICAL THERAPIST

## 2020-11-18 ENCOUNTER — WORKER'S COMP (OUTPATIENT)
Dept: PHYSICAL THERAPY | Age: 35
End: 2020-11-18

## 2020-11-18 DIAGNOSIS — M25.571 RIGHT ANKLE PAIN, UNSPECIFIED CHRONICITY: Primary | ICD-10-CM

## 2020-11-18 DIAGNOSIS — Z98.1 STATUS POST ANKLE FUSION: ICD-10-CM

## 2020-11-18 PROCEDURE — 97110 THERAPEUTIC EXERCISES: CPT | Performed by: PHYSICAL THERAPIST

## 2020-11-18 PROCEDURE — 97112 NEUROMUSCULAR REEDUCATION: CPT | Performed by: PHYSICAL THERAPIST

## 2020-11-18 PROCEDURE — 97140 MANUAL THERAPY 1/> REGIONS: CPT | Performed by: PHYSICAL THERAPIST

## 2020-11-23 ENCOUNTER — WORKER'S COMP (OUTPATIENT)
Dept: PHYSICAL THERAPY | Age: 35
End: 2020-11-23

## 2020-11-23 DIAGNOSIS — M25.571 ACUTE RIGHT ANKLE PAIN: Primary | ICD-10-CM

## 2020-11-23 DIAGNOSIS — Z98.1 STATUS POST ANKLE FUSION: ICD-10-CM

## 2020-11-23 PROCEDURE — 97110 THERAPEUTIC EXERCISES: CPT | Performed by: PHYSICAL THERAPIST

## 2020-11-23 PROCEDURE — 97112 NEUROMUSCULAR REEDUCATION: CPT | Performed by: PHYSICAL THERAPIST

## 2020-11-23 PROCEDURE — 97140 MANUAL THERAPY 1/> REGIONS: CPT | Performed by: PHYSICAL THERAPIST

## 2020-11-24 ENCOUNTER — TELEPHONE (OUTPATIENT)
Dept: PHYSICAL THERAPY | Age: 35
End: 2020-11-24

## 2020-12-02 ENCOUNTER — TELEPHONE (OUTPATIENT)
Dept: PODIATRY | Age: 35
End: 2020-12-02

## 2020-12-02 ENCOUNTER — WORKER'S COMP (OUTPATIENT)
Dept: PHYSICAL THERAPY | Age: 35
End: 2020-12-02

## 2020-12-02 DIAGNOSIS — Z98.890 S/P FOOT SURGERY, RIGHT: ICD-10-CM

## 2020-12-02 DIAGNOSIS — Z98.1 STATUS POST ANKLE FUSION: ICD-10-CM

## 2020-12-02 DIAGNOSIS — M25.571 ACUTE RIGHT ANKLE PAIN: Primary | ICD-10-CM

## 2020-12-02 PROCEDURE — 97140 MANUAL THERAPY 1/> REGIONS: CPT | Performed by: PHYSICAL THERAPIST

## 2020-12-02 PROCEDURE — 97112 NEUROMUSCULAR REEDUCATION: CPT | Performed by: PHYSICAL THERAPIST

## 2020-12-02 PROCEDURE — 97110 THERAPEUTIC EXERCISES: CPT | Performed by: PHYSICAL THERAPIST

## 2020-12-07 RX ORDER — HYDROCODONE BITARTRATE AND ACETAMINOPHEN 5; 325 MG/1; MG/1
1 TABLET ORAL EVERY 8 HOURS PRN
Qty: 20 TABLET | Refills: 0 | Status: SHIPPED | OUTPATIENT
Start: 2020-12-07 | End: 2021-07-07 | Stop reason: ALTCHOICE

## 2020-12-08 ENCOUNTER — WORKER'S COMP (OUTPATIENT)
Dept: PHYSICAL THERAPY | Age: 35
End: 2020-12-08

## 2020-12-08 DIAGNOSIS — Z98.1 STATUS POST ANKLE FUSION: ICD-10-CM

## 2020-12-08 DIAGNOSIS — M25.571 ACUTE RIGHT ANKLE PAIN: Primary | ICD-10-CM

## 2020-12-08 PROCEDURE — 97112 NEUROMUSCULAR REEDUCATION: CPT | Performed by: PHYSICAL THERAPIST

## 2020-12-08 PROCEDURE — 97110 THERAPEUTIC EXERCISES: CPT | Performed by: PHYSICAL THERAPIST

## 2020-12-10 ENCOUNTER — WORKER'S COMP (OUTPATIENT)
Dept: PHYSICAL THERAPY | Age: 35
End: 2020-12-10

## 2020-12-10 DIAGNOSIS — M25.571 ACUTE RIGHT ANKLE PAIN: Primary | ICD-10-CM

## 2020-12-10 DIAGNOSIS — Z98.1 STATUS POST ANKLE FUSION: ICD-10-CM

## 2020-12-10 PROCEDURE — 97110 THERAPEUTIC EXERCISES: CPT | Performed by: PHYSICAL THERAPIST

## 2020-12-10 PROCEDURE — 97112 NEUROMUSCULAR REEDUCATION: CPT | Performed by: PHYSICAL THERAPIST

## 2020-12-10 PROCEDURE — 97530 THERAPEUTIC ACTIVITIES: CPT | Performed by: PHYSICAL THERAPIST

## 2020-12-15 ENCOUNTER — WORKER'S COMP (OUTPATIENT)
Dept: PHYSICAL THERAPY | Age: 35
End: 2020-12-15

## 2020-12-15 DIAGNOSIS — M25.571 ACUTE RIGHT ANKLE PAIN: Primary | ICD-10-CM

## 2020-12-15 DIAGNOSIS — Z98.1 STATUS POST ANKLE FUSION: ICD-10-CM

## 2020-12-15 PROCEDURE — 97110 THERAPEUTIC EXERCISES: CPT | Performed by: PHYSICAL THERAPIST

## 2020-12-15 PROCEDURE — 97112 NEUROMUSCULAR REEDUCATION: CPT | Performed by: PHYSICAL THERAPIST

## 2020-12-15 PROCEDURE — 97530 THERAPEUTIC ACTIVITIES: CPT | Performed by: PHYSICAL THERAPIST

## 2020-12-16 ENCOUNTER — EXTERNAL RECORD (OUTPATIENT)
Dept: HEALTH INFORMATION MANAGEMENT | Facility: OTHER | Age: 35
End: 2020-12-16

## 2020-12-17 ENCOUNTER — WORKER'S COMP (OUTPATIENT)
Dept: PODIATRY | Age: 35
End: 2020-12-17

## 2020-12-17 DIAGNOSIS — M65.9 SYNOVITIS OF RIGHT ANKLE: ICD-10-CM

## 2020-12-17 DIAGNOSIS — M65.879 OTHER SYNOVITIS AND TENOSYNOVITIS, UNSPECIFIED ANKLE AND FOOT: ICD-10-CM

## 2020-12-17 DIAGNOSIS — M79.661 PAIN OF RIGHT CALF: ICD-10-CM

## 2020-12-17 DIAGNOSIS — M19.071 DJD (DEGENERATIVE JOINT DISEASE), ANKLE AND FOOT, RIGHT: ICD-10-CM

## 2020-12-17 DIAGNOSIS — R93.7 BONE MARROW EDEMA: ICD-10-CM

## 2020-12-17 DIAGNOSIS — Z98.890 S/P FOOT SURGERY, RIGHT: Primary | ICD-10-CM

## 2020-12-17 PROCEDURE — 99213 OFFICE O/P EST LOW 20 MIN: CPT | Performed by: PODIATRIST

## 2020-12-17 RX ORDER — DEXTROAMPHETAMINE SACCHARATE, AMPHETAMINE ASPARTATE, DEXTROAMPHETAMINE SULFATE AND AMPHETAMINE SULFATE 2.5; 2.5; 2.5; 2.5 MG/1; MG/1; MG/1; MG/1
TABLET ORAL EVERY 12 HOURS
COMMUNITY
Start: 2011-03-22 | End: 2020-12-17 | Stop reason: SDUPTHER

## 2020-12-17 RX ORDER — ALPRAZOLAM 0.5 MG/1
TABLET ORAL EVERY 6 HOURS SCHEDULED
COMMUNITY
End: 2020-12-17 | Stop reason: DRUGHIGH

## 2020-12-17 SDOH — HEALTH STABILITY: MENTAL HEALTH: HOW OFTEN DO YOU HAVE A DRINK CONTAINING ALCOHOL?: NEVER

## 2020-12-29 ENCOUNTER — TELEPHONE (OUTPATIENT)
Dept: PODIATRY | Age: 35
End: 2020-12-29

## 2021-01-02 ENCOUNTER — APPOINTMENT (OUTPATIENT)
Dept: CT IMAGING | Age: 36
End: 2021-01-02
Attending: PODIATRIST

## 2021-01-05 ENCOUNTER — WORKER'S COMP (OUTPATIENT)
Dept: PHYSICAL THERAPY | Age: 36
End: 2021-01-05

## 2021-01-05 DIAGNOSIS — M25.571 ACUTE RIGHT ANKLE PAIN: Primary | ICD-10-CM

## 2021-01-05 DIAGNOSIS — Z98.1 STATUS POST ANKLE FUSION: ICD-10-CM

## 2021-01-05 PROCEDURE — 97110 THERAPEUTIC EXERCISES: CPT | Performed by: PHYSICAL THERAPIST

## 2021-01-05 PROCEDURE — 97140 MANUAL THERAPY 1/> REGIONS: CPT | Performed by: PHYSICAL THERAPIST

## 2021-01-05 PROCEDURE — 97112 NEUROMUSCULAR REEDUCATION: CPT | Performed by: PHYSICAL THERAPIST

## 2021-01-05 ASSESSMENT — ENCOUNTER SYMPTOMS: PAIN SEVERITY NOW: 4

## 2021-01-07 ENCOUNTER — WORKER'S COMP (OUTPATIENT)
Dept: PHYSICAL THERAPY | Age: 36
End: 2021-01-07

## 2021-01-07 ENCOUNTER — WORKER'S COMP (OUTPATIENT)
Dept: PODIATRY | Age: 36
End: 2021-01-07

## 2021-01-07 ENCOUNTER — TELEPHONE (OUTPATIENT)
Dept: PODIATRY | Age: 36
End: 2021-01-07

## 2021-01-07 DIAGNOSIS — M65.879 OTHER SYNOVITIS AND TENOSYNOVITIS, UNSPECIFIED ANKLE AND FOOT: ICD-10-CM

## 2021-01-07 DIAGNOSIS — Z98.890 S/P FOOT SURGERY, RIGHT: Primary | ICD-10-CM

## 2021-01-07 DIAGNOSIS — M65.9 SYNOVITIS OF RIGHT ANKLE: ICD-10-CM

## 2021-01-07 DIAGNOSIS — M25.571 CHRONIC PAIN OF RIGHT ANKLE: Primary | ICD-10-CM

## 2021-01-07 DIAGNOSIS — M19.071 DJD (DEGENERATIVE JOINT DISEASE), ANKLE AND FOOT, RIGHT: ICD-10-CM

## 2021-01-07 DIAGNOSIS — Z98.1 STATUS POST ANKLE FUSION: ICD-10-CM

## 2021-01-07 DIAGNOSIS — G89.29 CHRONIC PAIN OF RIGHT ANKLE: Primary | ICD-10-CM

## 2021-01-07 DIAGNOSIS — S92.135P: ICD-10-CM

## 2021-01-07 PROCEDURE — 97110 THERAPEUTIC EXERCISES: CPT | Performed by: PHYSICAL THERAPIST

## 2021-01-07 PROCEDURE — 99215 OFFICE O/P EST HI 40 MIN: CPT | Performed by: PODIATRIST

## 2021-01-07 PROCEDURE — 97112 NEUROMUSCULAR REEDUCATION: CPT | Performed by: PHYSICAL THERAPIST

## 2021-01-07 RX ORDER — HYDROCODONE BITARTRATE AND ACETAMINOPHEN 5; 325 MG/1; MG/1
TABLET ORAL
Qty: 30 TABLET | Refills: 0 | Status: SHIPPED | OUTPATIENT
Start: 2021-01-07 | End: 2021-07-07 | Stop reason: ALTCHOICE

## 2021-01-07 SDOH — HEALTH STABILITY: MENTAL HEALTH: HOW OFTEN DO YOU HAVE A DRINK CONTAINING ALCOHOL?: NEVER

## 2021-01-11 ENCOUNTER — TELEPHONE (OUTPATIENT)
Dept: PHYSICAL THERAPY | Age: 36
End: 2021-01-11

## 2021-01-12 ENCOUNTER — APPOINTMENT (OUTPATIENT)
Dept: PHYSICAL THERAPY | Age: 36
End: 2021-01-12

## 2021-01-13 ENCOUNTER — TELEPHONE (OUTPATIENT)
Dept: PHYSICAL THERAPY | Age: 36
End: 2021-01-13

## 2021-01-13 ENCOUNTER — WORKER'S COMP (OUTPATIENT)
Dept: PHYSICAL THERAPY | Age: 36
End: 2021-01-13

## 2021-01-13 DIAGNOSIS — Z98.1 STATUS POST ANKLE FUSION: ICD-10-CM

## 2021-01-13 DIAGNOSIS — M25.571 CHRONIC PAIN OF RIGHT ANKLE: Primary | ICD-10-CM

## 2021-01-13 DIAGNOSIS — G89.29 CHRONIC PAIN OF RIGHT ANKLE: Primary | ICD-10-CM

## 2021-01-13 PROCEDURE — 97112 NEUROMUSCULAR REEDUCATION: CPT | Performed by: PHYSICAL THERAPIST

## 2021-01-18 ENCOUNTER — WORKER'S COMP (OUTPATIENT)
Dept: PHYSICAL THERAPY | Age: 36
End: 2021-01-18

## 2021-01-18 DIAGNOSIS — M25.571 CHRONIC PAIN OF RIGHT ANKLE: Primary | ICD-10-CM

## 2021-01-18 DIAGNOSIS — G89.29 CHRONIC PAIN OF RIGHT ANKLE: Primary | ICD-10-CM

## 2021-01-18 DIAGNOSIS — Z98.1 STATUS POST ANKLE FUSION: ICD-10-CM

## 2021-01-18 PROCEDURE — 97112 NEUROMUSCULAR REEDUCATION: CPT | Performed by: PHYSICAL THERAPIST

## 2021-01-18 PROCEDURE — 97530 THERAPEUTIC ACTIVITIES: CPT | Performed by: PHYSICAL THERAPIST

## 2021-01-19 ENCOUNTER — APPOINTMENT (OUTPATIENT)
Dept: PHYSICAL THERAPY | Age: 36
End: 2021-01-19

## 2021-01-20 ENCOUNTER — WORKER'S COMP (OUTPATIENT)
Dept: PHYSICAL THERAPY | Age: 36
End: 2021-01-20

## 2021-01-20 DIAGNOSIS — Z98.1 STATUS POST ANKLE FUSION: ICD-10-CM

## 2021-01-20 DIAGNOSIS — M25.571 CHRONIC PAIN OF RIGHT ANKLE: Primary | ICD-10-CM

## 2021-01-20 DIAGNOSIS — G89.29 CHRONIC PAIN OF RIGHT ANKLE: Primary | ICD-10-CM

## 2021-01-20 PROCEDURE — 97112 NEUROMUSCULAR REEDUCATION: CPT | Performed by: PHYSICAL THERAPIST

## 2021-01-21 ENCOUNTER — TELEPHONE (OUTPATIENT)
Dept: PODIATRY | Age: 36
End: 2021-01-21

## 2021-01-25 ENCOUNTER — TELEPHONE (OUTPATIENT)
Dept: FAMILY MEDICINE | Age: 36
End: 2021-01-25

## 2021-01-25 ENCOUNTER — TELEPHONE (OUTPATIENT)
Dept: PHYSICAL THERAPY | Age: 36
End: 2021-01-25

## 2021-01-25 ENCOUNTER — OFFICE VISIT (OUTPATIENT)
Dept: PODIATRY | Age: 36
End: 2021-01-25

## 2021-01-25 DIAGNOSIS — Z98.890 S/P FOOT SURGERY, RIGHT: ICD-10-CM

## 2021-01-25 DIAGNOSIS — M76.71 PERONEUS LONGUS TENDONITIS, RIGHT: Primary | ICD-10-CM

## 2021-01-25 DIAGNOSIS — M72.2 PLANTAR FASCIITIS OF RIGHT FOOT: ICD-10-CM

## 2021-01-25 PROCEDURE — 99215 OFFICE O/P EST HI 40 MIN: CPT | Performed by: PODIATRIST

## 2021-01-25 RX ORDER — RIVAROXABAN 15 MG-20MG
KIT ORAL
COMMUNITY
Start: 2021-01-21 | End: 2021-01-25 | Stop reason: SDUPTHER

## 2021-01-25 SDOH — HEALTH STABILITY: MENTAL HEALTH: HOW OFTEN DO YOU HAVE A DRINK CONTAINING ALCOHOL?: NEVER

## 2021-01-27 ENCOUNTER — APPOINTMENT (OUTPATIENT)
Dept: PHYSICAL THERAPY | Age: 36
End: 2021-01-27

## 2021-01-28 ENCOUNTER — TELEPHONE (OUTPATIENT)
Dept: PODIATRY | Age: 36
End: 2021-01-28

## 2021-01-30 ENCOUNTER — IMAGING SERVICES (OUTPATIENT)
Dept: CT IMAGING | Age: 36
End: 2021-01-30
Attending: PODIATRIST

## 2021-01-30 DIAGNOSIS — M19.071 DJD (DEGENERATIVE JOINT DISEASE), ANKLE AND FOOT, RIGHT: ICD-10-CM

## 2021-01-30 DIAGNOSIS — Z98.890 S/P FOOT SURGERY, RIGHT: ICD-10-CM

## 2021-01-30 PROCEDURE — 73700 CT LOWER EXTREMITY W/O DYE: CPT | Performed by: RADIOLOGY

## 2021-02-03 ENCOUNTER — V-VISIT (OUTPATIENT)
Dept: PODIATRY | Age: 36
End: 2021-02-03

## 2021-02-03 DIAGNOSIS — M19.071 DJD (DEGENERATIVE JOINT DISEASE), ANKLE AND FOOT, RIGHT: ICD-10-CM

## 2021-02-03 DIAGNOSIS — R93.7 BONE MARROW EDEMA: ICD-10-CM

## 2021-02-03 DIAGNOSIS — M76.71 PERONEUS LONGUS TENDONITIS, RIGHT: ICD-10-CM

## 2021-02-03 DIAGNOSIS — Z98.890 S/P FOOT SURGERY, RIGHT: Primary | ICD-10-CM

## 2021-02-03 DIAGNOSIS — M65.879 OTHER SYNOVITIS AND TENOSYNOVITIS, UNSPECIFIED ANKLE AND FOOT: ICD-10-CM

## 2021-02-03 DIAGNOSIS — M72.2 PLANTAR FASCIITIS OF RIGHT FOOT: ICD-10-CM

## 2021-02-03 DIAGNOSIS — M65.9 SYNOVITIS OF RIGHT ANKLE: ICD-10-CM

## 2021-02-03 PROCEDURE — 99213 OFFICE O/P EST LOW 20 MIN: CPT | Performed by: PODIATRIST

## 2021-02-03 SDOH — HEALTH STABILITY: MENTAL HEALTH: HOW OFTEN DO YOU HAVE A DRINK CONTAINING ALCOHOL?: NEVER

## 2021-02-10 ENCOUNTER — TELEPHONE (OUTPATIENT)
Dept: FAMILY MEDICINE | Age: 36
End: 2021-02-10

## 2021-02-18 ENCOUNTER — TELEPHONE (OUTPATIENT)
Dept: FAMILY MEDICINE CLINIC | Facility: CLINIC | Age: 36
End: 2021-02-18

## 2021-02-18 ENCOUNTER — TELEMEDICINE (OUTPATIENT)
Dept: FAMILY MEDICINE CLINIC | Facility: CLINIC | Age: 36
End: 2021-02-18
Payer: MEDICAID

## 2021-02-18 DIAGNOSIS — G89.29 CHRONIC PAIN OF RIGHT ANKLE: Primary | ICD-10-CM

## 2021-02-18 DIAGNOSIS — I82.431 ACUTE DEEP VEIN THROMBOSIS (DVT) OF RIGHT POPLITEAL VEIN (HCC): ICD-10-CM

## 2021-02-18 DIAGNOSIS — Z98.890 HISTORY OF ANKLE SURGERY: ICD-10-CM

## 2021-02-18 DIAGNOSIS — I71.2 ASCENDING AORTIC ANEURYSM (HCC): ICD-10-CM

## 2021-02-18 DIAGNOSIS — M25.571 CHRONIC PAIN OF RIGHT ANKLE: Primary | ICD-10-CM

## 2021-02-18 PROBLEM — I71.21 ASCENDING AORTIC ANEURYSM: Status: ACTIVE | Noted: 2021-02-18

## 2021-02-18 PROBLEM — I71.21 ASCENDING AORTIC ANEURYSM (HCC): Status: ACTIVE | Noted: 2021-02-18

## 2021-02-18 PROCEDURE — 99203 OFFICE O/P NEW LOW 30 MIN: CPT | Performed by: FAMILY MEDICINE

## 2021-02-18 NOTE — PROGRESS NOTES
Virtual/Telephone Check-In    Silvana Argueta is a 28year old male here today for a telemedicine audio and video visit. HPI:       1. Chronic pain of right ankle  2. History of ankle surgery  3.  Acute deep vein thrombosis (DVT) of right poplitea mouth daily with food.        Imaging & Referrals:  OP REFERRAL TO [facility] HEMATOLOGY/ONCOLOGY GROUP       PHYSICAL EXAM:     Gen: NAD, alert and oriented x 3, able to speak in full sentences  Pulm: No labored breathing or appreciable shortness of b by Vipin Tripathi MD at [address] [place] [address]   • TRANSFORAMINAL LUMBAR EPIDURAL STEROID INJECTION MULTIPLE LEVEL AND BILATERAL Bilateral 8/24/2015    Performed by Vipin Tripathi MD at [facility] MAIN OR      Family History   Problem Relation Age of Onset   • Cancer Ma adequate time to complete the visit. The patient was made aware of the limitations of the telehealth visit, including treatment limitations as no physical exam could be performed.   The patient was advised to call 911 or to go to the ER in case there was a

## 2021-02-18 NOTE — PATIENT INSTRUCTIONS
Call to schedule with hematology to followup about the blood clot    Schedule appt with me after that to followup on everything    Call sooner if any questions

## 2021-02-18 NOTE — TELEPHONE ENCOUNTER
Per Daina   Xarelto 20mg is not covered.      Alternatives include:  Enoxaparin (soln) - NO PA required  Warfarin 5mg tab- NO PA required  Eliquis 5mg Tab- PA Required  Pradaxa 150mg Cap- PA Required  Savaysa 60mg Tab - PA Required    PA for Xarelto ini

## 2021-02-18 NOTE — TELEPHONE ENCOUNTER
Per [facility] PA has been DENIED .    States patient does not meet criteria for coverage     Appeal can be processed at fax [phone] with records and medical    Phone# [phone]  Tracking# [id number]

## 2021-02-19 NOTE — TELEPHONE ENCOUNTER
Please try to process appeal    He has completed 3 weeks of xarleto  Needs to finish course (full 3 months at least) due to new DVT    Please call patient to see how many he has left  Do we have samples? If not, can we call reps for samples?     Thanks

## 2021-02-19 NOTE — TELEPHONE ENCOUNTER
Called plan regarding prior auth. They took additional information and are going to re-process PA. They said they will call pharmacy when they are finished. Informed pharmacy to be expecting a call. Did call patient and spoke with him.   He has 7 of the

## 2021-02-22 NOTE — TELEPHONE ENCOUNTER
Pharmacy states to call [employer] d/t workers comp case for prior Sravanthi Chan [phone] #[id number]. Called [facility] and they said they will send off prior auth. States usually the physician office does not do this, the pharmacy usually initiates.   She said abner

## 2021-02-23 NOTE — TELEPHONE ENCOUNTER
[facility]. They state is has been sent to [id number] [place] [address] for approval. Waiting on adjustor.

## 2021-02-26 ENCOUNTER — TELEPHONE (OUTPATIENT)
Dept: FAMILY MEDICINE CLINIC | Facility: CLINIC | Age: 36
End: 2021-02-26

## 2021-02-26 NOTE — TELEPHONE ENCOUNTER
Recommend start  mg daily until we have received information back regarding most recent prior authorization request.

## 2021-02-26 NOTE — TELEPHONE ENCOUNTER
Order placed. Received prior auth for medication. Prior auth completed through [employer]. Yamini Dunham ([employer]: [id number])     Your information has been submitted to [employer].  [employer] is reviewing the PA request and you will receive an frankie

## 2021-02-26 NOTE — TELEPHONE ENCOUNTER
Ama Ocampo, DO  You 4 minutes ago (9:45 AM)     OK to switch from Xarelto to Eliquis. Eliquis 5 mg bid #60 with 2 refills. Start the Eliquis when next dose of Xarelto is due. Do not take the Eliquis and Xarelto together.  The Eliquis is to replac

## 2021-02-26 NOTE — TELEPHONE ENCOUNTER
Patient called requesting Medical Cannabis form to be filled out by Dr. Arnol Eisenberg. Per patient, he discussed this with Dr. Arnol Eisenberg on last virtual visit.

## 2021-03-01 NOTE — TELEPHONE ENCOUNTER
Left detailed message for patient to drop off or upload form being requested.    Advised we would let him know once ready

## 2021-03-01 NOTE — TELEPHONE ENCOUNTER
Prior auth for Esme Jay has been approved. 1-1-21 through 8/26/21. Pharmacy notified. Pharmacy states patient has picked up medication.

## 2021-03-03 NOTE — PROGRESS NOTES
[facility]    Visit Diagnosis:  Acute deep vein thrombosis (DVT) of right popliteal vein (HCC)  (primary encounter diagnosis)    History of Present Illness: 35M with a PMH of HTN, ADHD, VIMAL and lumbar radiculopathy referred 1/23/2015   • Attention deficit disorder with hyperactivity(314.01)    • Depressive disorder, not elsewhere classified    • Essential hypertension    • Lumbosacral radiculopathy 9/24/2015    R   • Obstructive sleep apnea (adult) (pediatric) 6/29/2012    Do level: Not on file    Tobacco Use      Smoking status: Former Smoker        Types: Cigarettes        Quit date: 10/3/2013        Years since quittin.4      Smokeless tobacco: Never Used    Substance and Sexual Activity      Alcohol use:  Yes        Alco will check a repeat doppler on 4/21/21 and D-dimer. If negative, ok to stop anticoagulation for a provoked DVT    Thoracic aortic aneurysm: 4.3 cm.    - Following with Dr. Coretta Jiménez at [facility]. Recommends a repeat CT scan in 1 year.     Dony Adames

## 2021-03-04 ENCOUNTER — TELEPHONE (OUTPATIENT)
Dept: HEMATOLOGY/ONCOLOGY | Facility: HOSPITAL | Age: 36
End: 2021-03-04

## 2021-03-04 ENCOUNTER — OFFICE VISIT (OUTPATIENT)
Dept: HEMATOLOGY/ONCOLOGY | Facility: HOSPITAL | Age: 36
End: 2021-03-04
Attending: INTERNAL MEDICINE
Payer: MEDICAID

## 2021-03-04 VITALS
RESPIRATION RATE: 18 BRPM | TEMPERATURE: 98 F | DIASTOLIC BLOOD PRESSURE: 84 MMHG | BODY MASS INDEX: 38 KG/M2 | OXYGEN SATURATION: 96 % | SYSTOLIC BLOOD PRESSURE: 127 MMHG | HEART RATE: 104 BPM | WEIGHT: 255 LBS

## 2021-03-04 DIAGNOSIS — I82.431 ACUTE DEEP VEIN THROMBOSIS (DVT) OF RIGHT POPLITEAL VEIN (HCC): Primary | ICD-10-CM

## 2021-03-04 PROCEDURE — 99245 OFF/OP CONSLTJ NEW/EST HI 55: CPT | Performed by: INTERNAL MEDICINE

## 2021-03-04 RX ORDER — ALPRAZOLAM 1 MG/1
1 TABLET ORAL 3 TIMES DAILY PRN
COMMUNITY
Start: 2020-07-16

## 2021-03-04 RX ORDER — ASPIRIN 325 MG
325 TABLET ORAL DAILY
COMMUNITY
End: 2021-04-27

## 2021-03-04 RX ORDER — DEXTROAMPHETAMINE SACCHARATE, AMPHETAMINE ASPARTATE, DEXTROAMPHETAMINE SULFATE AND AMPHETAMINE SULFATE 2.5; 2.5; 2.5; 2.5 MG/1; MG/1; MG/1; MG/1
10 TABLET ORAL 3 TIMES DAILY
COMMUNITY
Start: 2019-10-14

## 2021-03-04 NOTE — PROGRESS NOTES
Education Record    Learner:  Patient    Disease / Diagnosis:DVT RLE     Barriers / Limitations:  None   Comments:    Method:  Discussion   Comments:    General Topics:  Plan of care reviewed   Comments:    Outcome:  Shows understanding   Comments:    Cathie

## 2021-03-04 NOTE — TELEPHONE ENCOUNTER
GENEVAM for patient requesting call back. Contact information provided. MD Danelle Ly RN             Hi     Can you ask him to drop his Aspirin down to 81 mg.  Thanks

## 2021-04-08 ENCOUNTER — WORKER'S COMP (OUTPATIENT)
Dept: PODIATRY | Age: 36
End: 2021-04-08

## 2021-04-08 ENCOUNTER — IMAGING SERVICES (OUTPATIENT)
Dept: GENERAL RADIOLOGY | Age: 36
End: 2021-04-08
Attending: PODIATRIST

## 2021-04-08 DIAGNOSIS — S92.135P: ICD-10-CM

## 2021-04-08 DIAGNOSIS — M72.2 PLANTAR FASCIITIS OF RIGHT FOOT: ICD-10-CM

## 2021-04-08 DIAGNOSIS — M76.71 PERONEUS LONGUS TENDONITIS, RIGHT: ICD-10-CM

## 2021-04-08 DIAGNOSIS — M19.071 DJD (DEGENERATIVE JOINT DISEASE), ANKLE AND FOOT, RIGHT: ICD-10-CM

## 2021-04-08 DIAGNOSIS — Z98.890 S/P FOOT SURGERY, RIGHT: ICD-10-CM

## 2021-04-08 DIAGNOSIS — M65.9 SYNOVITIS OF RIGHT ANKLE: ICD-10-CM

## 2021-04-08 DIAGNOSIS — M19.071 DJD (DEGENERATIVE JOINT DISEASE), ANKLE AND FOOT, RIGHT: Primary | ICD-10-CM

## 2021-04-08 PROCEDURE — 99213 OFFICE O/P EST LOW 20 MIN: CPT | Performed by: PODIATRIST

## 2021-04-08 PROCEDURE — 73610 X-RAY EXAM OF ANKLE: CPT | Performed by: RADIOLOGY

## 2021-04-21 ENCOUNTER — HOSPITAL ENCOUNTER (OUTPATIENT)
Dept: ULTRASOUND IMAGING | Age: 36
Discharge: HOME | End: 2021-04-21
Attending: INTERNAL MEDICINE
Payer: MEDICAID

## 2021-04-21 DIAGNOSIS — I82.431 ACUTE DEEP VEIN THROMBOSIS (DVT) OF RIGHT POPLITEAL VEIN (HCC): ICD-10-CM

## 2021-04-21 PROCEDURE — 93971 EXTREMITY STUDY: CPT | Performed by: INTERNAL MEDICINE

## 2021-04-21 NOTE — PROGRESS NOTES
[facility] Hematology and Oncology Clinic Note    Visit Diagnosis:  Acute deep vein thrombosis (DVT) of right popliteal vein (HCC)  (primary encounter diagnosis)    History of Present Illness: 35M with a PMH of HTN, ADHD, VIMAL and lumbar radiculopathy referred facility-administered medications on file prior to visit.     Past Medical History:   Diagnosis Date   • Anxiety 1/23/2015   • Attention deficit disorder with hyperactivity(314.01)    • Depressive disorder, not elsewhere classified    • Essential hypertensi ILIOINGUINAL NERVE BLOCK;  Surgeon: Ulisses Tamez MD;  Location: [facility] MAIN OR   • REMOVAL OF TALUS Right 11/2019     Social History    Socioeconomic History      Marital status:       Spouse name: Not on file      Number of children: Not on file slightly longer than normal, it is highly suspicious that his DVT was provoked and occurred sometime in between September 2020 and January 2021 due to functional immobility.    - Given the prolonged period between his surgery and DVT diagnosis, a repeat do

## 2021-04-27 ENCOUNTER — OFFICE VISIT (OUTPATIENT)
Dept: HEMATOLOGY/ONCOLOGY | Facility: HOSPITAL | Age: 36
End: 2021-04-27
Attending: INTERNAL MEDICINE
Payer: MEDICAID

## 2021-04-27 VITALS
HEART RATE: 73 BPM | TEMPERATURE: 97 F | BODY MASS INDEX: 35.1 KG/M2 | RESPIRATION RATE: 16 BRPM | SYSTOLIC BLOOD PRESSURE: 134 MMHG | DIASTOLIC BLOOD PRESSURE: 88 MMHG | WEIGHT: 237 LBS | OXYGEN SATURATION: 96 % | HEIGHT: 69.02 IN

## 2021-04-27 DIAGNOSIS — I82.431 ACUTE DEEP VEIN THROMBOSIS (DVT) OF RIGHT POPLITEAL VEIN (HCC): Primary | ICD-10-CM

## 2021-04-27 PROCEDURE — 99214 OFFICE O/P EST MOD 30 MIN: CPT | Performed by: INTERNAL MEDICINE

## 2021-05-12 ENCOUNTER — TELEPHONE (OUTPATIENT)
Dept: FAMILY MEDICINE CLINIC | Facility: CLINIC | Age: 36
End: 2021-05-12

## 2021-05-12 NOTE — TELEPHONE ENCOUNTER
Medical Cannabis Verification form has been put on my desk. Patient has an appointment scheduled for 06/09/2021 to fill out form. I will hold form until patient comes in.

## 2021-06-08 ENCOUNTER — LAB ENCOUNTER (OUTPATIENT)
Dept: LAB | Age: 36
End: 2021-06-08
Payer: MEDICAID

## 2021-06-08 DIAGNOSIS — Z79.899 ENCOUNTER FOR LONG-TERM (CURRENT) DRUG USE: ICD-10-CM

## 2021-06-08 DIAGNOSIS — Z79.891 ADMISSION FOR LONG-TERM OPIATE ANALGESIC USE: Primary | ICD-10-CM

## 2021-06-08 PROCEDURE — 80061 LIPID PANEL: CPT

## 2021-06-08 PROCEDURE — 36415 COLL VENOUS BLD VENIPUNCTURE: CPT

## 2021-06-08 PROCEDURE — 84443 ASSAY THYROID STIM HORMONE: CPT

## 2021-06-08 PROCEDURE — 85025 COMPLETE CBC W/AUTO DIFF WBC: CPT

## 2021-06-08 PROCEDURE — 83036 HEMOGLOBIN GLYCOSYLATED A1C: CPT

## 2021-06-08 PROCEDURE — 80053 COMPREHEN METABOLIC PANEL: CPT

## 2021-06-09 ENCOUNTER — OFFICE VISIT (OUTPATIENT)
Dept: FAMILY MEDICINE CLINIC | Facility: CLINIC | Age: 36
End: 2021-06-09
Payer: MEDICAID

## 2021-06-09 VITALS
WEIGHT: 232 LBS | BODY MASS INDEX: 34.36 KG/M2 | TEMPERATURE: 99 F | HEART RATE: 82 BPM | SYSTOLIC BLOOD PRESSURE: 110 MMHG | DIASTOLIC BLOOD PRESSURE: 70 MMHG | HEIGHT: 69.02 IN

## 2021-06-09 DIAGNOSIS — I82.431 ACUTE DEEP VEIN THROMBOSIS (DVT) OF RIGHT POPLITEAL VEIN (HCC): ICD-10-CM

## 2021-06-09 DIAGNOSIS — G89.29 CHRONIC PAIN OF RIGHT ANKLE: ICD-10-CM

## 2021-06-09 DIAGNOSIS — M54.16 LUMBAR RADICULOPATHY: ICD-10-CM

## 2021-06-09 DIAGNOSIS — I71.2 ASCENDING AORTIC ANEURYSM (HCC): ICD-10-CM

## 2021-06-09 DIAGNOSIS — M25.571 CHRONIC PAIN OF RIGHT ANKLE: ICD-10-CM

## 2021-06-09 DIAGNOSIS — Z00.00 ROUTINE GENERAL MEDICAL EXAMINATION AT A HEALTH CARE FACILITY: Primary | ICD-10-CM

## 2021-06-09 PROCEDURE — 3074F SYST BP LT 130 MM HG: CPT | Performed by: FAMILY MEDICINE

## 2021-06-09 PROCEDURE — 3008F BODY MASS INDEX DOCD: CPT | Performed by: FAMILY MEDICINE

## 2021-06-09 PROCEDURE — 99395 PREV VISIT EST AGE 18-39: CPT | Performed by: FAMILY MEDICINE

## 2021-06-09 PROCEDURE — 3078F DIAST BP <80 MM HG: CPT | Performed by: FAMILY MEDICINE

## 2021-06-09 PROCEDURE — 99214 OFFICE O/P EST MOD 30 MIN: CPT | Performed by: FAMILY MEDICINE

## 2021-06-09 RX ORDER — ASPIRIN 325 MG
325 TABLET ORAL DAILY
COMMUNITY

## 2021-06-09 NOTE — PATIENT INSTRUCTIONS
HDL (good cholesterol) is low - increase exercise, more fruits/veggies in diet, more fish in diet (or start fish oil 1000mg daily)    Continue to work on increasing cardio exercise    Followup with Dr. Carlito Oden as planned    Followup in 6 months, sooner if

## 2021-06-09 NOTE — PROGRESS NOTES
Eddie Amaya is a 39year old male who is here for Patient presents with:  Wellness Visit      HPI:     1. Routine general medical examination at a health care facility  -due for wellness  -reviewed recent labs    2.  Acute deep vein thrombosis (D week        Types: Cannabis    Other Topics      Concerns:        Caffeine Concern: Yes          1 can of pop and 1 glass of iced tea daily; 1-3 cups of coffee weekly        Exercise: Yes          walking daily        Seat Belt: Yes      Wt Readings from L ROS  GENERAL HEALTH: no other complaints  NEURO: no other complaints  VISION: no other complaints  RESPIRATORY: no other complaints  CARDIOVASCULAR: no other complaints  GI: no other complaints  : no other complaints  SKIN: no other complaints  PSYCH: no 6/29/2012    Doesn't want mouth or cpap    • Panic disorder 6/11/2015   • Rhabdomyolysis 6/16/2015    Labs 6.2015   • Right low back pain 6/11/2015   • Sciatica 6/11/2015      Past Surgical History:   Procedure Laterality Date   • HAND/FINGER SURGERY UNLIS tobacco: Never Used    Vaping Use      Vaping Use: Never used    Alcohol use:  Yes      Alcohol/week: 0.0 standard drinks      Comment: rare    Drug use: Yes      Frequency: 7.0 times per week      Types: Cannabis          EXAM:   /70   Pulse 82   Tem encounter. Meds This Visit:  Requested Prescriptions      No prescriptions requested or ordered in this encounter       Imaging & Referrals:  None     The patient indicates understanding of these issues and agrees to the plan.   The patient is asked to

## 2021-07-07 ENCOUNTER — WORKER'S COMP (OUTPATIENT)
Dept: PODIATRY | Age: 36
End: 2021-07-07

## 2021-07-07 ENCOUNTER — IMAGING SERVICES (OUTPATIENT)
Dept: GENERAL RADIOLOGY | Age: 36
End: 2021-07-07
Attending: PODIATRIST

## 2021-07-07 DIAGNOSIS — M19.071 DJD (DEGENERATIVE JOINT DISEASE), ANKLE AND FOOT, RIGHT: ICD-10-CM

## 2021-07-07 DIAGNOSIS — M65.9 SYNOVITIS OF RIGHT ANKLE: Primary | ICD-10-CM

## 2021-07-07 DIAGNOSIS — Z98.890 S/P FOOT SURGERY, RIGHT: ICD-10-CM

## 2021-07-07 DIAGNOSIS — M65.9 SYNOVITIS OF RIGHT ANKLE: ICD-10-CM

## 2021-07-07 PROCEDURE — 99213 OFFICE O/P EST LOW 20 MIN: CPT | Performed by: PODIATRIST

## 2021-07-07 PROCEDURE — 73610 X-RAY EXAM OF ANKLE: CPT | Performed by: RADIOLOGY

## 2021-07-07 PROCEDURE — 20605 DRAIN/INJ JOINT/BURSA W/O US: CPT | Performed by: PODIATRIST

## 2021-07-07 RX ORDER — METHYLPREDNISOLONE ACETATE 40 MG/ML
10 INJECTION, SUSPENSION INTRA-ARTICULAR; INTRALESIONAL; INTRAMUSCULAR; SOFT TISSUE ONCE
Status: COMPLETED | OUTPATIENT
Start: 2021-07-07 | End: 2021-07-07

## 2021-07-07 RX ORDER — DEXAMETHASONE SODIUM PHOSPHATE 4 MG/ML
4 INJECTION, SOLUTION INTRA-ARTICULAR; INTRALESIONAL; INTRAMUSCULAR; INTRAVENOUS; SOFT TISSUE ONCE
Status: COMPLETED | OUTPATIENT
Start: 2021-07-07 | End: 2021-07-07

## 2021-07-07 RX ADMIN — METHYLPREDNISOLONE ACETATE 10 MG: 40 INJECTION, SUSPENSION INTRA-ARTICULAR; INTRALESIONAL; INTRAMUSCULAR; SOFT TISSUE at 10:10

## 2021-07-07 RX ADMIN — DEXAMETHASONE SODIUM PHOSPHATE 4 MG: 4 INJECTION, SOLUTION INTRA-ARTICULAR; INTRALESIONAL; INTRAMUSCULAR; INTRAVENOUS; SOFT TISSUE at 10:10

## 2021-07-28 ENCOUNTER — HOSPITAL ENCOUNTER (OUTPATIENT)
Dept: ULTRASOUND IMAGING | Age: 36
Discharge: HOME | End: 2021-07-28
Attending: INTERNAL MEDICINE
Payer: MEDICAID

## 2021-07-28 DIAGNOSIS — I82.431 ACUTE DEEP VEIN THROMBOSIS (DVT) OF RIGHT POPLITEAL VEIN (HCC): ICD-10-CM

## 2021-07-28 PROCEDURE — 93971 EXTREMITY STUDY: CPT | Performed by: INTERNAL MEDICINE

## 2021-07-29 NOTE — PROGRESS NOTES
[facility] Hematology and Oncology Clinic Note    Visit Diagnosis:  Acute deep vein thrombosis (DVT) of right popliteal vein (HCC)  (primary encounter diagnosis)    History of Present Illness: 35M with a PMH of HTN, ADHD, VIMAL and lumbar radiculopathy referred overall. He still has occasional swelling when he is on his feet for a long time. No issues with Eliquis. No bleeding or bruising.       Review of Systems: 12 Point ROS was completed and pertinent positives are in the HPI    apixaban (ELIQUIS) 5 MG Oral Tab OR   • OTHER SURGICAL HISTORY Right 11/3/2015    Procedure: LUMBAR FACET INJECTION;  Surgeon: Memo Wong MD;  Location: [facility] MAIN OR   • OTHER SURGICAL HISTORY Right 12/17/2015    Procedure: RADIOFREQUENCY ABLATION OF THORACIC OR LUMBAR MEDIAL BRANCH; II-XII grossly intact    Results:  Lab Results   Component Value Date    WBC 7.7 06/08/2021    HGB 14.7 06/08/2021    HCT 43.8 06/08/2021    MCV 87.1 06/08/2021    .0 06/08/2021     Lab Results   Component Value Date     06/08/2021    K 3.8 06 was provoked by surgery, his risk for future DVT is ~3-5% in 5 years. If his DVT was unprovoked, he has a ~30% risk at 5 years. - We will stop his Eliquis today and check serial D-dimers at baseline, 1-3 months.  If d-dimer remains negative, he will remai

## 2021-07-30 ENCOUNTER — OFFICE VISIT (OUTPATIENT)
Dept: HEMATOLOGY/ONCOLOGY | Facility: HOSPITAL | Age: 36
End: 2021-07-30
Attending: INTERNAL MEDICINE
Payer: MEDICAID

## 2021-07-30 VITALS
BODY MASS INDEX: 34 KG/M2 | TEMPERATURE: 97 F | RESPIRATION RATE: 18 BRPM | WEIGHT: 231.5 LBS | OXYGEN SATURATION: 95 % | DIASTOLIC BLOOD PRESSURE: 77 MMHG | SYSTOLIC BLOOD PRESSURE: 113 MMHG | HEART RATE: 87 BPM

## 2021-07-30 DIAGNOSIS — I82.431 ACUTE DEEP VEIN THROMBOSIS (DVT) OF RIGHT POPLITEAL VEIN (HCC): Primary | ICD-10-CM

## 2021-07-30 LAB — D-DIMER: <0.27 UG/ML FEU (ref ?–0.5)

## 2021-07-30 PROCEDURE — 99214 OFFICE O/P EST MOD 30 MIN: CPT | Performed by: INTERNAL MEDICINE

## 2021-07-30 NOTE — PROGRESS NOTES
Patient is here for MD f/u for DVT. Patient continues on Eliquis 5 mg bid. Patient reports mild pain and edema in right lower extremity. Patient went to [facility] at the end of June due to swelling. Per patient, ultrasound was negative for DVT.  Leandro

## 2021-09-02 ENCOUNTER — TELEPHONE (OUTPATIENT)
Dept: HEMATOLOGY/ONCOLOGY | Facility: HOSPITAL | Age: 36
End: 2021-09-02

## 2021-09-02 ENCOUNTER — LAB ENCOUNTER (OUTPATIENT)
Dept: LAB | Age: 36
End: 2021-09-02
Attending: INTERNAL MEDICINE
Payer: MEDICAID

## 2021-09-02 DIAGNOSIS — I82.431 ACUTE DEEP VEIN THROMBOSIS (DVT) OF RIGHT POPLITEAL VEIN (HCC): ICD-10-CM

## 2021-09-02 LAB — D-DIMER: <0.27 UG/ML FEU (ref ?–0.5)

## 2021-09-02 PROCEDURE — 85379 FIBRIN DEGRADATION QUANT: CPT

## 2021-09-02 PROCEDURE — 36415 COLL VENOUS BLD VENIPUNCTURE: CPT

## 2021-09-02 NOTE — TELEPHONE ENCOUNTER
Zachary Abad MD  P [facility]  Results reviewed. D-dimer is normal. Lets repeat it in 2 months. LVM to review the above. Asked to call back with any questions and recheck labs as recommended in 2 months.

## 2021-09-28 ENCOUNTER — E-ADVICE (OUTPATIENT)
Dept: PODIATRY | Age: 36
End: 2021-09-28

## 2021-10-06 ENCOUNTER — TELEPHONE (OUTPATIENT)
Dept: PODIATRY | Age: 36
End: 2021-10-06

## 2021-10-06 ENCOUNTER — WORKER'S COMP (OUTPATIENT)
Dept: PODIATRY | Age: 36
End: 2021-10-06

## 2021-10-06 DIAGNOSIS — Z98.890 S/P FOOT SURGERY, RIGHT: ICD-10-CM

## 2021-10-06 DIAGNOSIS — M65.9 SYNOVITIS OF RIGHT ANKLE: Primary | ICD-10-CM

## 2021-10-06 DIAGNOSIS — M19.071 DJD (DEGENERATIVE JOINT DISEASE), ANKLE AND FOOT, RIGHT: ICD-10-CM

## 2021-10-06 DIAGNOSIS — S92.135P: ICD-10-CM

## 2021-10-06 DIAGNOSIS — M72.2 PLANTAR FASCIITIS OF RIGHT FOOT: ICD-10-CM

## 2021-10-06 PROCEDURE — 99213 OFFICE O/P EST LOW 20 MIN: CPT | Performed by: PODIATRIST

## 2021-10-06 PROCEDURE — 20605 DRAIN/INJ JOINT/BURSA W/O US: CPT | Performed by: PODIATRIST

## 2021-10-06 RX ORDER — DEXAMETHASONE SODIUM PHOSPHATE 4 MG/ML
4 INJECTION, SOLUTION INTRA-ARTICULAR; INTRALESIONAL; INTRAMUSCULAR; INTRAVENOUS; SOFT TISSUE ONCE
Status: COMPLETED | OUTPATIENT
Start: 2021-10-06 | End: 2021-10-06

## 2021-10-06 RX ORDER — ASPIRIN 325 MG
325 TABLET ORAL DAILY
COMMUNITY
End: 2023-08-23 | Stop reason: DRUGHIGH

## 2021-10-06 RX ORDER — METHYLPREDNISOLONE ACETATE 40 MG/ML
10 INJECTION, SUSPENSION INTRA-ARTICULAR; INTRALESIONAL; INTRAMUSCULAR; SOFT TISSUE ONCE
Status: COMPLETED | OUTPATIENT
Start: 2021-10-06 | End: 2021-10-06

## 2021-10-06 RX ADMIN — DEXAMETHASONE SODIUM PHOSPHATE 4 MG: 4 INJECTION, SOLUTION INTRA-ARTICULAR; INTRALESIONAL; INTRAMUSCULAR; INTRAVENOUS; SOFT TISSUE at 10:01

## 2021-10-06 RX ADMIN — METHYLPREDNISOLONE ACETATE 10 MG: 40 INJECTION, SUSPENSION INTRA-ARTICULAR; INTRALESIONAL; INTRAMUSCULAR; SOFT TISSUE at 10:02

## 2021-10-13 ENCOUNTER — TELEPHONE (OUTPATIENT)
Dept: PODIATRY | Age: 36
End: 2021-10-13

## 2021-10-13 DIAGNOSIS — M65.9 SYNOVITIS OF RIGHT ANKLE: Primary | ICD-10-CM

## 2021-10-13 DIAGNOSIS — M65.879 OTHER SYNOVITIS AND TENOSYNOVITIS, UNSPECIFIED ANKLE AND FOOT: ICD-10-CM

## 2021-10-13 DIAGNOSIS — M76.71 PERONEUS LONGUS TENDONITIS, RIGHT: ICD-10-CM

## 2021-10-13 DIAGNOSIS — M72.2 PLANTAR FASCIITIS OF RIGHT FOOT: ICD-10-CM

## 2021-10-13 DIAGNOSIS — S92.135P: ICD-10-CM

## 2021-10-13 DIAGNOSIS — M19.071 DJD (DEGENERATIVE JOINT DISEASE), ANKLE AND FOOT, RIGHT: ICD-10-CM

## 2021-10-13 DIAGNOSIS — Z98.890 S/P FOOT SURGERY, RIGHT: ICD-10-CM

## 2021-11-08 ENCOUNTER — E-ADVICE (OUTPATIENT)
Dept: PODIATRY | Age: 36
End: 2021-11-08

## 2021-11-08 ENCOUNTER — TELEPHONE (OUTPATIENT)
Dept: PODIATRY | Age: 36
End: 2021-11-08

## 2021-11-29 ENCOUNTER — OFFICE VISIT (OUTPATIENT)
Dept: FAMILY MEDICINE CLINIC | Facility: CLINIC | Age: 36
End: 2021-11-29
Payer: MEDICAID

## 2021-11-29 VITALS
SYSTOLIC BLOOD PRESSURE: 100 MMHG | HEIGHT: 69.02 IN | BODY MASS INDEX: 33.77 KG/M2 | OXYGEN SATURATION: 98 % | DIASTOLIC BLOOD PRESSURE: 60 MMHG | HEART RATE: 84 BPM | TEMPERATURE: 98 F | WEIGHT: 228 LBS

## 2021-11-29 DIAGNOSIS — I82.431 ACUTE DEEP VEIN THROMBOSIS (DVT) OF RIGHT POPLITEAL VEIN (HCC): ICD-10-CM

## 2021-11-29 DIAGNOSIS — I71.2 ASCENDING AORTIC ANEURYSM (HCC): ICD-10-CM

## 2021-11-29 DIAGNOSIS — M25.571 CHRONIC PAIN OF RIGHT ANKLE: Primary | ICD-10-CM

## 2021-11-29 DIAGNOSIS — G89.29 CHRONIC PAIN OF RIGHT ANKLE: Primary | ICD-10-CM

## 2021-11-29 PROCEDURE — 3008F BODY MASS INDEX DOCD: CPT | Performed by: FAMILY MEDICINE

## 2021-11-29 PROCEDURE — 3078F DIAST BP <80 MM HG: CPT | Performed by: FAMILY MEDICINE

## 2021-11-29 PROCEDURE — 99214 OFFICE O/P EST MOD 30 MIN: CPT | Performed by: FAMILY MEDICINE

## 2021-11-29 PROCEDURE — 3074F SYST BP LT 130 MM HG: CPT | Performed by: FAMILY MEDICINE

## 2021-11-29 NOTE — PROGRESS NOTES
Jasiel Dale is a 39year old male here for Patient presents with:  Complete Form: Medical/Treatment Verification Form jayna       HPI:       1.  Chronic pain of right ankle  -continues to have chronic pain issues  -using medical INJECTION MULTIPLE LEVEL AND BILATERAL;  Surgeon: Ulisses Tamez MD;  Location: [facility] MAIN OR   • OTHER SURGICAL HISTORY Right 11/3/2015    Procedure: LUMBAR FACET INJECTION;  Surgeon: Ulisses Tamez MD;  Location: [facility] MAIN OR   • OTHER SURGICAL HISTORY now.      ROS:   See HPI for relevant ROS    --GEN: No other complaints  --HEENT: No other complaints  --RESP: No other complaints  --CV: No other complaints  --GI: No other complaints  --MSK: No other complaints    All other systems reviewed are negative

## 2021-12-21 ENCOUNTER — OFFICE VISIT (OUTPATIENT)
Dept: FAMILY MEDICINE CLINIC | Facility: CLINIC | Age: 36
End: 2021-12-21
Payer: MEDICAID

## 2021-12-21 VITALS
WEIGHT: 225 LBS | RESPIRATION RATE: 18 BRPM | DIASTOLIC BLOOD PRESSURE: 78 MMHG | HEIGHT: 69 IN | BODY MASS INDEX: 33.33 KG/M2 | SYSTOLIC BLOOD PRESSURE: 122 MMHG | OXYGEN SATURATION: 99 % | TEMPERATURE: 98 F | HEART RATE: 90 BPM

## 2021-12-21 DIAGNOSIS — R51.9 ACUTE NONINTRACTABLE HEADACHE, UNSPECIFIED HEADACHE TYPE: ICD-10-CM

## 2021-12-21 DIAGNOSIS — Z20.822 SUSPECTED COVID-19 VIRUS INFECTION: Primary | ICD-10-CM

## 2021-12-21 DIAGNOSIS — J02.9 SORE THROAT: ICD-10-CM

## 2021-12-21 DIAGNOSIS — J06.9 VIRAL URI WITH COUGH: ICD-10-CM

## 2021-12-21 PROCEDURE — 99213 OFFICE O/P EST LOW 20 MIN: CPT | Performed by: NURSE PRACTITIONER

## 2021-12-21 PROCEDURE — 3078F DIAST BP <80 MM HG: CPT | Performed by: NURSE PRACTITIONER

## 2021-12-21 PROCEDURE — 3074F SYST BP LT 130 MM HG: CPT | Performed by: NURSE PRACTITIONER

## 2021-12-21 PROCEDURE — 3008F BODY MASS INDEX DOCD: CPT | Performed by: NURSE PRACTITIONER

## 2021-12-21 PROCEDURE — 87880 STREP A ASSAY W/OPTIC: CPT | Performed by: NURSE PRACTITIONER

## 2021-12-21 NOTE — PROGRESS NOTES
CHIEF COMPLAINT:   Patient presents with:  Cough: started yesterday, + body aches      HPI:   Anthony Ma is a 39year old male who presents for covid-19 testing. Patient reports cough, body aches, chills, sore throat and intermittent headaches. SURGICAL HISTORY Bilateral 9/1/2015    Procedure: TRANSFORAMINAL LUMBAR EPIDURAL STEROID INJECTION MULTIPLE LEVEL AND BILATERAL;  Surgeon: Sienna Bridges MD;  Location: [address] [place] [address] MAIN OR   • OTHER SURGICAL HISTORY Bilateral 9/8/2015    Procedure: Robin Mixon dizziness.      EXAM:   /78   Pulse 90   Temp 98.1 °F (36.7 °C) (Temporal)   Resp 18   Ht 5' 9\" (1.753 m)   Wt 225 lb (102.1 kg)   SpO2 99%   BMI 33.23 kg/m²   GENERAL: well developed, well nourished,in no apparent distress  SKIN: no rashes,no suspic with pt. Pt has reassuring physical exam consistent with pharyngitis and mild viral uri symptoms. No signs of pta or retropharyngeal infection. Lungs clear bilat. No respiratory distress noted. We discussed covid-19 vs strep vs other etiologies.  Rapid str positive results, aftercare, and plasma donation.       Quarantine (for anyone in close contact with someone who has COVID-19)  Anyone who has been in close contact with someone who has COVID-19 should quarantine at home for 14 days from the time of exposur continues to endorse quarantine for 14 days and recognizes that any quarantine shorter than 14 days balances reduced burden against a small possibility of spreading the virus. 10 Ways to Manage Your Health at Home      1.  Stay home from work, school, an you have not been exposed or are not aware of an exposure to COVID-19 and are concerned about your symptoms, please contact your health care provider with any questions.     Home Isolation  If you have tested positive for COVID-19, you should remain under h plasma. Convalescent plasma is a component of blood that, in people who have recovered from COVID-19, contains antibodies against the virus.  The antibodies in plasma can be used as a treatment for patients in our community who are most severely affected ongoing or new symptoms. Post COVID conditions are a wide range of new, returning, or ongoing health problems. Talk to your provider if you are not feeling well 4 or more weeks after being diagnosed with COVID-19.   Patients with Post-COVID conditions may (Adult)    You have a viral upper respiratory illness (URI), which is another term for the common cold. This illness is contagious during the first few days. It is spread through the air by coughing and sneezing.  It may also be spread by direct contact (to blood pressure.)  Follow-up care  Follow up with your healthcare provider, or as advised.   When to seek medical advice  Call your healthcare provider right away if any of these occur:  · Cough with lots of colored sputum (mucus)  · Severe headache; face, n evaluation  A health evaluation can help find the cause of your sore throat. It can also help your healthcare provider choose the best treatment for you. The evaluation may include a health history, physical exam, and diagnostic tests.    Health history  Yo These can irritate the throat. · Gargle with warm saltwater ( 1 teaspoon of salt to  8 ounces of warm water). · Use a humidifier to keep air moist and relieve throat dryness. · Try over-the-counter pain relievers such as acetaminophen or ibuprofen.  Use difficult. · The pain is severe enough to keep you from drinking liquids. · If a skin rash or hives, develops, call your healthcare provider immediately. Any of these could signal an allergic reaction to antibiotics.   · Symptoms don’t improve within a we younger because of the risk of getting Reye syndrome. Use pain medicines only when needed. Certain prescription and OTC medicines can lead to rebound headaches if they are taken too often.  Check with your healthcare provider or pharmacist about your medici 7/1/2020 © 2000-2021 [employer]. All rights reserved. This information is not intended as a substitute for professional medical care. Always follow your healthcare professional's instructions.               The patient indicates understandin

## 2021-12-21 NOTE — PATIENT INSTRUCTIONS
1. Rest. Drink plenty of fluids. 2. Tylenol/Ibuprofen for pain/fevers. 3. Salt water gargles three times daily  4. Use humidifier at home when possible. 5. The rapid strep test was negative today. 6. Covid-19 testing sent to lab.   Self quarantine at t coughed, or somehow got respiratory droplets on you    Reducing the length of quarantine may make it easier for people to quarantine by reducing the time they cannot work.  A shorter quarantine period also can lessen stress on the public health system, raeann 7. Wash your hands often with soap and water for at least 20 seconds or clean your hands with an alcohol-based hand  that contains at least 60% alcohol. 8. As much as possible, stay in a specific room and away from other people in your home.  A immunocompromised.   If you have a fever with cough or shortness of breath but have not been exposed to someone with COVID-19 and have not tested positive for COVID-19, you should also stay home and away from others for a total of 10 days after your symptom plasma? Potential convalescent plasma donors must:    · Have had a confirmed diagnosis of COVID-19  · Be symptom-free for at least 14 days*    *Some people will be required to have a repeat COVID-19 test in order to be eligible to donate.  If you’re inst who experience Post-COVID conditions to be random. Researchers are trying to identify similarities between people with a Post-COVID condition to better understand if there are risk factors. How do I prevent a Post-COVID condition?   The best way to pre stopping, talk with your healthcare provider. · Avoid being exposed to cigarette smoke (yours or others’).   · You may use acetaminophen or ibuprofen to control pain and fever, unless another medicine was prescribed. If you have chronic liver or kidney dis substitute for professional medical care. Always follow your healthcare professional's instructions. When You Have a Sore Throat  A sore throat can be painful. There are many reasons why you may have a sore throat.  Your healthcare provider will work exam, your healthcare provider checks your ears, nose, and throat for problems.  He or she also checks for swelling in the neck, and may listen to your chest.   Possible tests  Other tests your healthcare provider may perform include:   · A throat swab to c antibiotics when they’re not needed may lead to bacteria that are harder to kill. Your healthcare provider will prescribe antibiotics only if he or she thinks they are likely to help. If antibiotics are prescribed  Take the medicine exactly as directed. healthcare professional's instructions. Self-Care for Headaches  Most headaches aren't serious and can be relieved with self-care. But some headaches may be a sign of another health problem like eye trouble or high blood pressure.  To find the best t shoulders  · Headache without a definite beginning or end  · Headache after an activity such as driving or working on a computer  Signs of migraine  Any of the following can be signs:   · Throbbing pain on one or both sides of your head  · Nausea or vomiti

## 2022-01-28 ENCOUNTER — TELEPHONE (OUTPATIENT)
Dept: PODIATRY | Age: 37
End: 2022-01-28

## 2022-02-09 ENCOUNTER — APPOINTMENT (OUTPATIENT)
Dept: PODIATRY | Age: 37
End: 2022-02-09

## 2022-02-10 ENCOUNTER — WORKER'S COMP (OUTPATIENT)
Dept: PODIATRY | Age: 37
End: 2022-02-10

## 2022-02-10 DIAGNOSIS — Z98.890 S/P FOOT SURGERY, RIGHT: ICD-10-CM

## 2022-02-10 DIAGNOSIS — M65.9 SYNOVITIS OF RIGHT ANKLE: Primary | ICD-10-CM

## 2022-02-10 DIAGNOSIS — M65.879 OTHER SYNOVITIS AND TENOSYNOVITIS, UNSPECIFIED ANKLE AND FOOT: ICD-10-CM

## 2022-02-10 DIAGNOSIS — M76.71 PERONEUS LONGUS TENDONITIS, RIGHT: ICD-10-CM

## 2022-02-10 DIAGNOSIS — M25.571 PAIN IN JOINT OF RIGHT FOOT: ICD-10-CM

## 2022-02-10 PROCEDURE — 99213 OFFICE O/P EST LOW 20 MIN: CPT | Performed by: PODIATRIST

## 2022-02-10 PROCEDURE — 20605 DRAIN/INJ JOINT/BURSA W/O US: CPT | Performed by: PODIATRIST

## 2022-02-10 RX ORDER — TRIAMCINOLONE ACETONIDE 40 MG/ML
10 INJECTION, SUSPENSION INTRA-ARTICULAR; INTRAMUSCULAR ONCE
Status: COMPLETED | OUTPATIENT
Start: 2022-02-10 | End: 2022-02-10

## 2022-02-10 RX ORDER — DEXAMETHASONE SODIUM PHOSPHATE 4 MG/ML
4 INJECTION, SOLUTION INTRA-ARTICULAR; INTRALESIONAL; INTRAMUSCULAR; INTRAVENOUS; SOFT TISSUE ONCE
Status: COMPLETED | OUTPATIENT
Start: 2022-02-10 | End: 2022-02-10

## 2022-02-10 RX ADMIN — TRIAMCINOLONE ACETONIDE 10 MG: 40 INJECTION, SUSPENSION INTRA-ARTICULAR; INTRAMUSCULAR at 13:28

## 2022-02-10 RX ADMIN — DEXAMETHASONE SODIUM PHOSPHATE 4 MG: 4 INJECTION, SOLUTION INTRA-ARTICULAR; INTRALESIONAL; INTRAMUSCULAR; INTRAVENOUS; SOFT TISSUE at 13:26

## 2022-02-11 ENCOUNTER — TELEPHONE (OUTPATIENT)
Dept: PODIATRY | Age: 37
End: 2022-02-11

## 2022-02-28 ENCOUNTER — IMAGING SERVICES (OUTPATIENT)
Dept: OTHER | Age: 37
End: 2022-02-28
Attending: PODIATRIST

## 2022-03-10 ENCOUNTER — APPOINTMENT (OUTPATIENT)
Dept: MRI IMAGING | Age: 37
End: 2022-03-10
Attending: PODIATRIST

## 2022-03-24 ENCOUNTER — WORKER'S COMP (OUTPATIENT)
Dept: PODIATRY | Age: 37
End: 2022-03-24

## 2022-03-24 DIAGNOSIS — M25.571 PAIN IN JOINT OF RIGHT FOOT: ICD-10-CM

## 2022-03-24 DIAGNOSIS — M96.0 NONUNION OF SUBTALAR ARTHRODESIS: Primary | ICD-10-CM

## 2022-03-24 DIAGNOSIS — M65.9 SYNOVITIS OF RIGHT FOOT: ICD-10-CM

## 2022-03-24 DIAGNOSIS — Z98.890 S/P FOOT SURGERY, RIGHT: ICD-10-CM

## 2022-03-24 PROCEDURE — 99214 OFFICE O/P EST MOD 30 MIN: CPT | Performed by: PODIATRIST

## 2022-03-25 ENCOUNTER — TELEPHONE (OUTPATIENT)
Dept: PODIATRY | Age: 37
End: 2022-03-25

## 2022-03-28 DIAGNOSIS — Z98.890 S/P FOOT SURGERY, RIGHT: ICD-10-CM

## 2022-03-28 DIAGNOSIS — M96.0 NONUNION OF SUBTALAR ARTHRODESIS: Primary | ICD-10-CM

## 2022-03-28 DIAGNOSIS — S92.135P: ICD-10-CM

## 2022-03-28 DIAGNOSIS — M65.9 SYNOVITIS OF RIGHT ANKLE: ICD-10-CM

## 2022-03-28 DIAGNOSIS — M76.71 PERONEUS LONGUS TENDONITIS, RIGHT: ICD-10-CM

## 2022-04-01 ENCOUNTER — TELEPHONE (OUTPATIENT)
Dept: PODIATRY | Age: 37
End: 2022-04-01

## 2022-04-01 DIAGNOSIS — T85.848D PAIN DUE TO ANY DEVICE, IMPLANT OR GRAFT, SUBSEQUENT ENCOUNTER: Primary | ICD-10-CM

## 2022-04-28 ENCOUNTER — E-ADVICE (OUTPATIENT)
Dept: PODIATRY | Age: 37
End: 2022-04-28

## 2022-05-05 ENCOUNTER — TELEPHONE (OUTPATIENT)
Dept: ORTHOPEDICS | Age: 37
End: 2022-05-05

## 2022-05-05 DIAGNOSIS — T85.848D PAIN DUE TO ANY DEVICE, IMPLANT OR GRAFT, SUBSEQUENT ENCOUNTER: Primary | ICD-10-CM

## 2022-05-11 ENCOUNTER — APPOINTMENT (OUTPATIENT)
Dept: MRI IMAGING | Facility: HOSPITAL | Age: 37
End: 2022-05-11
Attending: STUDENT IN AN ORGANIZED HEALTH CARE EDUCATION/TRAINING PROGRAM
Payer: COMMERCIAL

## 2022-05-11 ENCOUNTER — HOSPITAL ENCOUNTER (EMERGENCY)
Facility: HOSPITAL | Age: 37
Discharge: HOME | End: 2022-05-11
Attending: STUDENT IN AN ORGANIZED HEALTH CARE EDUCATION/TRAINING PROGRAM
Payer: COMMERCIAL

## 2022-05-11 ENCOUNTER — OFFICE VISIT (OUTPATIENT)
Dept: FAMILY MEDICINE CLINIC | Facility: CLINIC | Age: 37
End: 2022-05-11
Payer: COMMERCIAL

## 2022-05-11 VITALS
HEART RATE: 90 BPM | OXYGEN SATURATION: 97 % | BODY MASS INDEX: 33.77 KG/M2 | TEMPERATURE: 97 F | WEIGHT: 228 LBS | SYSTOLIC BLOOD PRESSURE: 110 MMHG | DIASTOLIC BLOOD PRESSURE: 60 MMHG | HEIGHT: 69 IN

## 2022-05-11 VITALS
HEART RATE: 78 BPM | BODY MASS INDEX: 34.07 KG/M2 | RESPIRATION RATE: 16 BRPM | WEIGHT: 230 LBS | SYSTOLIC BLOOD PRESSURE: 119 MMHG | DIASTOLIC BLOOD PRESSURE: 80 MMHG | OXYGEN SATURATION: 98 % | HEIGHT: 69 IN | TEMPERATURE: 98 F

## 2022-05-11 DIAGNOSIS — M54.16 LUMBAR RADICULOPATHY: Primary | ICD-10-CM

## 2022-05-11 DIAGNOSIS — R20.2 PARESTHESIAS: ICD-10-CM

## 2022-05-11 DIAGNOSIS — R20.0 SADDLE ANESTHESIA: ICD-10-CM

## 2022-05-11 DIAGNOSIS — M54.9 SEVERE BACK PAIN: Primary | ICD-10-CM

## 2022-05-11 LAB
ALBUMIN SERPL-MCNC: 3.7 G/DL (ref 3.4–5)
ALBUMIN/GLOB SERPL: 1 {RATIO} (ref 1–2)
ALP LIVER SERPL-CCNC: 64 U/L
ALT SERPL-CCNC: 26 U/L
ANION GAP SERPL CALC-SCNC: 4 MMOL/L (ref 0–18)
APTT PPP: 29.8 SECONDS (ref 23.3–35.6)
AST SERPL-CCNC: 19 U/L (ref 15–37)
BASOPHILS # BLD AUTO: 0.02 X10(3) UL (ref 0–0.2)
BASOPHILS NFR BLD AUTO: 0.2 %
BILIRUB SERPL-MCNC: 1 MG/DL (ref 0.1–2)
BUN BLD-MCNC: 10 MG/DL (ref 7–18)
CALCIUM BLD-MCNC: 8.9 MG/DL (ref 8.5–10.1)
CHLORIDE SERPL-SCNC: 105 MMOL/L (ref 98–112)
CO2 SERPL-SCNC: 30 MMOL/L (ref 21–32)
CREAT BLD-MCNC: 1.36 MG/DL
EOSINOPHIL # BLD AUTO: 0.05 X10(3) UL (ref 0–0.7)
EOSINOPHIL NFR BLD AUTO: 0.6 %
ERYTHROCYTE [DISTWIDTH] IN BLOOD BY AUTOMATED COUNT: 12.1 %
GLOBULIN PLAS-MCNC: 3.8 G/DL (ref 2.8–4.4)
GLUCOSE BLD-MCNC: 93 MG/DL (ref 70–99)
HCT VFR BLD AUTO: 44.5 %
HGB BLD-MCNC: 15.6 G/DL
IMM GRANULOCYTES # BLD AUTO: 0.02 X10(3) UL (ref 0–1)
IMM GRANULOCYTES NFR BLD: 0.2 %
INR BLD: 1.05 (ref 0.8–1.2)
LYMPHOCYTES # BLD AUTO: 2.89 X10(3) UL (ref 1–4)
LYMPHOCYTES NFR BLD AUTO: 34 %
MCH RBC QN AUTO: 30.2 PG (ref 26–34)
MCHC RBC AUTO-ENTMCNC: 35.1 G/DL (ref 31–37)
MCV RBC AUTO: 86.1 FL
MONOCYTES # BLD AUTO: 0.57 X10(3) UL (ref 0.1–1)
MONOCYTES NFR BLD AUTO: 6.7 %
NEUTROPHILS # BLD AUTO: 4.94 X10 (3) UL (ref 1.5–7.7)
NEUTROPHILS # BLD AUTO: 4.94 X10(3) UL (ref 1.5–7.7)
NEUTROPHILS NFR BLD AUTO: 58.3 %
OSMOLALITY SERPL CALC.SUM OF ELEC: 287 MOSM/KG (ref 275–295)
PLATELET # BLD AUTO: 192 10(3)UL (ref 150–450)
POTASSIUM SERPL-SCNC: 3.6 MMOL/L (ref 3.5–5.1)
PROT SERPL-MCNC: 7.5 G/DL (ref 6.4–8.2)
PROTHROMBIN TIME: 13.7 SECONDS (ref 11.6–14.8)
RBC # BLD AUTO: 5.17 X10(6)UL
SARS-COV-2 RNA RESP QL NAA+PROBE: NOT DETECTED
SODIUM SERPL-SCNC: 139 MMOL/L (ref 136–145)
WBC # BLD AUTO: 8.5 X10(3) UL (ref 4–11)

## 2022-05-11 PROCEDURE — 99215 OFFICE O/P EST HI 40 MIN: CPT | Performed by: FAMILY MEDICINE

## 2022-05-11 PROCEDURE — 3078F DIAST BP <80 MM HG: CPT | Performed by: FAMILY MEDICINE

## 2022-05-11 PROCEDURE — 96375 TX/PRO/DX INJ NEW DRUG ADDON: CPT

## 2022-05-11 PROCEDURE — 99284 EMERGENCY DEPT VISIT MOD MDM: CPT

## 2022-05-11 PROCEDURE — 85025 COMPLETE CBC W/AUTO DIFF WBC: CPT | Performed by: STUDENT IN AN ORGANIZED HEALTH CARE EDUCATION/TRAINING PROGRAM

## 2022-05-11 PROCEDURE — 85730 THROMBOPLASTIN TIME PARTIAL: CPT | Performed by: STUDENT IN AN ORGANIZED HEALTH CARE EDUCATION/TRAINING PROGRAM

## 2022-05-11 PROCEDURE — 80053 COMPREHEN METABOLIC PANEL: CPT | Performed by: STUDENT IN AN ORGANIZED HEALTH CARE EDUCATION/TRAINING PROGRAM

## 2022-05-11 PROCEDURE — 3008F BODY MASS INDEX DOCD: CPT | Performed by: FAMILY MEDICINE

## 2022-05-11 PROCEDURE — 99285 EMERGENCY DEPT VISIT HI MDM: CPT

## 2022-05-11 PROCEDURE — A9575 INJ GADOTERATE MEGLUMI 0.1ML: HCPCS | Performed by: STUDENT IN AN ORGANIZED HEALTH CARE EDUCATION/TRAINING PROGRAM

## 2022-05-11 PROCEDURE — 3074F SYST BP LT 130 MM HG: CPT | Performed by: FAMILY MEDICINE

## 2022-05-11 PROCEDURE — 72158 MRI LUMBAR SPINE W/O & W/DYE: CPT | Performed by: STUDENT IN AN ORGANIZED HEALTH CARE EDUCATION/TRAINING PROGRAM

## 2022-05-11 PROCEDURE — 96374 THER/PROPH/DIAG INJ IV PUSH: CPT

## 2022-05-11 PROCEDURE — 96376 TX/PRO/DX INJ SAME DRUG ADON: CPT

## 2022-05-11 PROCEDURE — 85610 PROTHROMBIN TIME: CPT | Performed by: STUDENT IN AN ORGANIZED HEALTH CARE EDUCATION/TRAINING PROGRAM

## 2022-05-11 RX ORDER — LORAZEPAM 2 MG/ML
0.5 INJECTION INTRAMUSCULAR ONCE
Status: COMPLETED | OUTPATIENT
Start: 2022-05-11 | End: 2022-05-11

## 2022-05-11 RX ORDER — NAPROXEN 500 MG/1
500 TABLET ORAL 2 TIMES DAILY PRN
Qty: 20 TABLET | Refills: 0 | Status: SHIPPED | OUTPATIENT
Start: 2022-05-11

## 2022-05-11 RX ORDER — ALPRAZOLAM 2 MG/1
1 TABLET ORAL 3 TIMES DAILY PRN
COMMUNITY
Start: 2022-04-22

## 2022-05-11 RX ORDER — LIDOCAINE 50 MG/G
2 PATCH TOPICAL EVERY 24 HOURS
Qty: 30 EACH | Refills: 0 | Status: SHIPPED | OUTPATIENT
Start: 2022-05-11

## 2022-05-11 RX ORDER — ONDANSETRON 2 MG/ML
4 INJECTION INTRAMUSCULAR; INTRAVENOUS ONCE
Status: COMPLETED | OUTPATIENT
Start: 2022-05-11 | End: 2022-05-11

## 2022-05-11 RX ORDER — HYDROMORPHONE HYDROCHLORIDE 1 MG/ML
0.5 INJECTION, SOLUTION INTRAMUSCULAR; INTRAVENOUS; SUBCUTANEOUS EVERY 30 MIN PRN
Status: COMPLETED | OUTPATIENT
Start: 2022-05-11 | End: 2022-05-11

## 2022-05-11 RX ORDER — DIAZEPAM 10 MG/1
10 TABLET ORAL 3 TIMES DAILY PRN
Qty: 20 TABLET | Refills: 0 | Status: SHIPPED | OUTPATIENT
Start: 2022-05-11 | End: 2022-05-18

## 2022-05-11 RX ORDER — GABAPENTIN 100 MG/1
100 CAPSULE ORAL 3 TIMES DAILY
Qty: 90 CAPSULE | Refills: 0 | Status: SHIPPED | OUTPATIENT
Start: 2022-05-11

## 2022-05-11 NOTE — ED INITIAL ASSESSMENT (HPI)
Pt states having multiple herniated discs in his back since 2016. Pt now c/o numbness and tingling in the groin and severe back pain. Pt sent by PCP for further evaluation. Per call in note, requesting MRI. Pt denies loss of bowel/bladder.

## 2022-05-17 ENCOUNTER — TELEPHONE (OUTPATIENT)
Dept: FAMILY MEDICINE CLINIC | Facility: CLINIC | Age: 37
End: 2022-05-17

## 2022-05-17 ENCOUNTER — PATIENT MESSAGE (OUTPATIENT)
Dept: FAMILY MEDICINE CLINIC | Facility: CLINIC | Age: 37
End: 2022-05-17

## 2022-05-17 DIAGNOSIS — M54.16 LUMBAR RADICULOPATHY: Primary | ICD-10-CM

## 2022-05-17 DIAGNOSIS — R20.0 SADDLE ANESTHESIA: ICD-10-CM

## 2022-06-01 DIAGNOSIS — M25.561 RIGHT KNEE PAIN, UNSPECIFIED CHRONICITY: Primary | ICD-10-CM

## 2022-06-03 ENCOUNTER — LAB SERVICES (OUTPATIENT)
Dept: LAB | Age: 37
End: 2022-06-03

## 2022-06-03 DIAGNOSIS — T85.848D PAIN DUE TO ANY DEVICE, IMPLANT OR GRAFT, SUBSEQUENT ENCOUNTER: ICD-10-CM

## 2022-06-03 LAB
BASOPHIL %: 0.3 % (ref 0–1.2)
BASOPHIL ABSOLUTE #: 0 10*3/UL (ref 0–0.1)
BUN SERPL-MCNC: 15 MG/DL (ref 6–27)
CALCIUM SERPL-MCNC: 9.3 MG/DL (ref 8.6–10.6)
CHLORIDE SERPL-SCNC: 103 MMOL/L (ref 96–107)
CO2 SERPL-SCNC: 26 MMOL/L (ref 22–32)
CREAT SERPL-MCNC: 1.3 MG/DL (ref 0.6–1.6)
DIFFERENTIAL TYPE: NORMAL
EOSINOPHIL %: 0.5 % (ref 0–10)
EOSINOPHIL ABSOLUTE #: 0 10*3/UL (ref 0–0.5)
GFR SERPL CREATININE-BSD FRML MDRD: >60 ML/MIN/{1.73M2}
GFR SERPL CREATININE-BSD FRML MDRD: >60 ML/MIN/{1.73M2}
GLUCOSE SERPL-MCNC: 96 MG/DL (ref 70–200)
HEMATOCRIT: 45.2 % (ref 40–51)
HEMOGLOBIN: 15.5 G/DL (ref 13.7–17.5)
IMMATURE GRANULOCYTE ABSOLUTE: 0.03 10*3/UL (ref 0–0.05)
IMMATURE GRANULOCYTE PERCENT: 0.4 % (ref 0–0.5)
LYMPH PERCENT: 25.4 % (ref 20.5–51.1)
LYMPHOCYTE ABSOLUTE #: 2 10*3/UL (ref 1.2–3.4)
MEAN CORPUSCULAR HGB CONCENTRATION: 34.3 % (ref 32–36)
MEAN CORPUSCULAR HGB: 30.2 PG (ref 27–34)
MEAN CORPUSCULAR VOLUME: 88.1 FL (ref 79–95)
MEAN PLATELET VOLUME: 10 FL (ref 8.6–12.4)
MONOCYTE ABSOLUTE #: 0.7 10*3/UL (ref 0.2–0.9)
MONOCYTE PERCENT: 8.5 % (ref 4.3–12.9)
NEUTROPHIL ABSOLUTE #: 5.1 10*3/UL (ref 1.4–6.5)
NEUTROPHIL PERCENT: 64.9 % (ref 34–73.5)
PLATELET COUNT: 202 10*3/UL (ref 150–400)
POTASSIUM SERPL-SCNC: 4.5 MMOL/L (ref 3.5–5.3)
RED BLOOD CELL COUNT: 5.13 10*6/UL (ref 3.9–5.7)
RED CELL DISTRIBUTION WIDTH: 12.2 % (ref 11.3–14.8)
SODIUM SERPL-SCNC: 142 MMOL/L (ref 136–146)
WHITE BLOOD CELL COUNT: 7.9 10*3/UL (ref 4–10)

## 2022-06-03 PROCEDURE — 85025 COMPLETE CBC W/AUTO DIFF WBC: CPT | Performed by: FAMILY MEDICINE

## 2022-06-03 PROCEDURE — 36415 COLL VENOUS BLD VENIPUNCTURE: CPT | Performed by: FAMILY MEDICINE

## 2022-06-03 PROCEDURE — 80048 BASIC METABOLIC PNL TOTAL CA: CPT | Performed by: FAMILY MEDICINE

## 2022-06-06 ENCOUNTER — OFFICE VISIT (OUTPATIENT)
Dept: FAMILY MEDICINE | Age: 37
End: 2022-06-06

## 2022-06-06 ENCOUNTER — APPOINTMENT (OUTPATIENT)
Dept: FAMILY MEDICINE | Age: 37
End: 2022-06-06

## 2022-06-06 VITALS
HEIGHT: 69 IN | OXYGEN SATURATION: 96 % | WEIGHT: 231 LBS | BODY MASS INDEX: 34.21 KG/M2 | DIASTOLIC BLOOD PRESSURE: 70 MMHG | SYSTOLIC BLOOD PRESSURE: 118 MMHG | HEART RATE: 88 BPM | TEMPERATURE: 98.3 F

## 2022-06-06 DIAGNOSIS — I71.21 ASCENDING AORTIC ANEURYSM (CMD): ICD-10-CM

## 2022-06-06 DIAGNOSIS — G89.29 CHRONIC PAIN OF RIGHT ANKLE: Primary | ICD-10-CM

## 2022-06-06 DIAGNOSIS — I82.531 CHRONIC DEEP VEIN THROMBOSIS (DVT) OF POPLITEAL VEIN OF RIGHT LOWER EXTREMITY (CMD): ICD-10-CM

## 2022-06-06 DIAGNOSIS — M25.571 CHRONIC PAIN OF RIGHT ANKLE: Primary | ICD-10-CM

## 2022-06-06 PROBLEM — I82.431 ACUTE DEEP VEIN THROMBOSIS (DVT) OF RIGHT POPLITEAL VEIN  (CMD): Status: RESOLVED | Noted: 2021-02-18 | Resolved: 2022-06-06

## 2022-06-06 PROBLEM — I82.431 ACUTE DEEP VEIN THROMBOSIS (DVT) OF RIGHT POPLITEAL VEIN  (CMD): Status: ACTIVE | Noted: 2021-02-18

## 2022-06-06 PROCEDURE — 99244 OFF/OP CNSLTJ NEW/EST MOD 40: CPT | Performed by: FAMILY MEDICINE

## 2022-06-08 ENCOUNTER — E-ADVICE (OUTPATIENT)
Dept: PODIATRY | Age: 37
End: 2022-06-08

## 2022-06-08 DIAGNOSIS — S92.135P: ICD-10-CM

## 2022-06-08 DIAGNOSIS — M65.9 SYNOVITIS OF RIGHT ANKLE: ICD-10-CM

## 2022-06-08 DIAGNOSIS — M19.071 DJD (DEGENERATIVE JOINT DISEASE), ANKLE AND FOOT, RIGHT: ICD-10-CM

## 2022-06-08 DIAGNOSIS — Z98.890 S/P FOOT SURGERY, RIGHT: ICD-10-CM

## 2022-06-08 DIAGNOSIS — M76.71 PERONEUS LONGUS TENDONITIS, RIGHT: ICD-10-CM

## 2022-06-08 DIAGNOSIS — M96.0 NONUNION OF SUBTALAR ARTHRODESIS: Primary | ICD-10-CM

## 2022-06-19 LAB
COVID TEST COMMENT: NORMAL
COVID-19 RESULT: NOT DETECTED

## 2022-06-21 ENCOUNTER — APPOINTMENT (OUTPATIENT)
Dept: OTHER | Facility: PHYSICIAN GROUP | Age: 37
End: 2022-06-21

## 2022-06-21 ENCOUNTER — EXTERNAL RECORD (OUTPATIENT)
Dept: PODIATRY | Age: 37
End: 2022-06-21

## 2022-06-21 PROCEDURE — 20680 REMOVAL OF IMPLANT DEEP: CPT | Performed by: PODIATRIST

## 2022-06-21 PROCEDURE — 28725 ARTHRODESIS SUBTALAR: CPT | Performed by: PODIATRIST

## 2022-06-21 PROCEDURE — 20999 UNLISTED PX MUSCSKEL GENERAL: CPT | Performed by: PODIATRIST

## 2022-06-21 PROCEDURE — 38232 BONE MARROW HARVEST AUTOLOG: CPT | Performed by: PODIATRIST

## 2022-06-24 ENCOUNTER — TELEPHONE (OUTPATIENT)
Dept: ORTHOPEDICS | Age: 37
End: 2022-06-24

## 2022-06-28 ENCOUNTER — TELEPHONE (OUTPATIENT)
Dept: PODIATRY | Age: 37
End: 2022-06-28

## 2022-06-29 DIAGNOSIS — Z98.890 STATUS POST FOOT SURGERY: Primary | ICD-10-CM

## 2022-06-29 RX ORDER — HYDROCODONE BITARTRATE AND ACETAMINOPHEN 10; 325 MG/1; MG/1
1 TABLET ORAL EVERY 6 HOURS PRN
Qty: 21 TABLET | Refills: 0 | Status: SHIPPED | OUTPATIENT
Start: 2022-06-29 | End: 2022-07-08 | Stop reason: SDUPTHER

## 2022-06-30 ENCOUNTER — WORKER'S COMP (OUTPATIENT)
Dept: PODIATRY | Age: 37
End: 2022-06-30

## 2022-06-30 DIAGNOSIS — M65.9 SYNOVITIS OF RIGHT ANKLE: ICD-10-CM

## 2022-06-30 DIAGNOSIS — T85.848D PAIN DUE TO ANY DEVICE, IMPLANT OR GRAFT, SUBSEQUENT ENCOUNTER: ICD-10-CM

## 2022-06-30 DIAGNOSIS — Z98.890 S/P FOOT SURGERY, RIGHT: Primary | ICD-10-CM

## 2022-06-30 DIAGNOSIS — M96.0 NONUNION OF SUBTALAR ARTHRODESIS: ICD-10-CM

## 2022-06-30 DIAGNOSIS — M19.071 DJD (DEGENERATIVE JOINT DISEASE), ANKLE AND FOOT, RIGHT: ICD-10-CM

## 2022-06-30 PROCEDURE — 99024 POSTOP FOLLOW-UP VISIT: CPT | Performed by: PODIATRIST

## 2022-06-30 RX ORDER — HYDROCODONE BITARTRATE AND ACETAMINOPHEN 10; 325 MG/1; MG/1
TABLET ORAL
Qty: 28 TABLET | Refills: 0 | Status: SHIPPED | OUTPATIENT
Start: 2022-06-30 | End: 2022-07-08 | Stop reason: SDUPTHER

## 2022-07-08 ENCOUNTER — WORKER'S COMP (OUTPATIENT)
Dept: PODIATRY | Age: 37
End: 2022-07-08

## 2022-07-08 DIAGNOSIS — T85.848D PAIN DUE TO ANY DEVICE, IMPLANT OR GRAFT, SUBSEQUENT ENCOUNTER: ICD-10-CM

## 2022-07-08 DIAGNOSIS — Z98.890 S/P FOOT SURGERY, RIGHT: Primary | ICD-10-CM

## 2022-07-08 DIAGNOSIS — M96.0 NONUNION OF SUBTALAR ARTHRODESIS: ICD-10-CM

## 2022-07-08 DIAGNOSIS — M65.9 SYNOVITIS OF RIGHT ANKLE: ICD-10-CM

## 2022-07-08 DIAGNOSIS — M19.071 DJD (DEGENERATIVE JOINT DISEASE), ANKLE AND FOOT, RIGHT: ICD-10-CM

## 2022-07-08 PROCEDURE — 29405 APPL SHORT LEG CAST: CPT | Performed by: PODIATRIST

## 2022-07-08 PROCEDURE — 99024 POSTOP FOLLOW-UP VISIT: CPT | Performed by: PODIATRIST

## 2022-07-08 RX ORDER — IBUPROFEN 600 MG/1
TABLET ORAL
Qty: 40 TABLET | Refills: 1 | Status: SHIPPED | OUTPATIENT
Start: 2022-07-08 | End: 2023-08-23 | Stop reason: ALTCHOICE

## 2022-07-08 RX ORDER — HYDROCODONE BITARTRATE AND ACETAMINOPHEN 5; 325 MG/1; MG/1
TABLET ORAL
Qty: 21 TABLET | Refills: 0 | Status: SHIPPED | OUTPATIENT
Start: 2022-07-08 | End: 2022-07-13 | Stop reason: DRUGHIGH

## 2022-07-08 RX ORDER — ACETAMINOPHEN 500 MG
TABLET ORAL
Qty: 40 TABLET | Refills: 1 | Status: SHIPPED | OUTPATIENT
Start: 2022-07-08 | End: 2023-08-23 | Stop reason: ALTCHOICE

## 2022-07-11 ENCOUNTER — TELEMEDICINE (OUTPATIENT)
Dept: FAMILY MEDICINE CLINIC | Facility: CLINIC | Age: 37
End: 2022-07-11
Payer: COMMERCIAL

## 2022-07-11 DIAGNOSIS — R19.7 DIARRHEA, UNSPECIFIED TYPE: Primary | ICD-10-CM

## 2022-07-11 DIAGNOSIS — F41.9 ANXIETY: ICD-10-CM

## 2022-07-11 DIAGNOSIS — F33.1 MODERATE EPISODE OF RECURRENT MAJOR DEPRESSIVE DISORDER (HCC): ICD-10-CM

## 2022-07-11 DIAGNOSIS — F90.8 ATTENTION DEFICIT HYPERACTIVITY DISORDER (ADHD), OTHER TYPE: ICD-10-CM

## 2022-07-11 DIAGNOSIS — R10.2 PERINEAL PAIN: ICD-10-CM

## 2022-07-11 DIAGNOSIS — R61 DIAPHORESIS: ICD-10-CM

## 2022-07-11 RX ORDER — HYDROCODONE BITARTRATE AND ACETAMINOPHEN 10; 325 MG/1; MG/1
TABLET ORAL
COMMUNITY
Start: 2022-07-05

## 2022-07-11 RX ORDER — PSEUDOEPHED/ACETAMINOPH/DIPHEN 30MG-500MG
500 TABLET ORAL EVERY 8 HOURS
COMMUNITY
Start: 2022-06-21

## 2022-07-11 RX ORDER — IBUPROFEN 600 MG/1
600 TABLET ORAL EVERY 8 HOURS
COMMUNITY
Start: 2022-06-21

## 2022-07-12 ENCOUNTER — TELEPHONE (OUTPATIENT)
Dept: PODIATRY | Age: 37
End: 2022-07-12

## 2022-07-13 ENCOUNTER — WORKER'S COMP (OUTPATIENT)
Dept: PODIATRY | Age: 37
End: 2022-07-13

## 2022-07-13 DIAGNOSIS — T85.848D PAIN DUE TO ANY DEVICE, IMPLANT OR GRAFT, SUBSEQUENT ENCOUNTER: ICD-10-CM

## 2022-07-13 DIAGNOSIS — Z98.890 S/P FOOT SURGERY, RIGHT: Primary | ICD-10-CM

## 2022-07-13 DIAGNOSIS — M65.9 SYNOVITIS OF RIGHT ANKLE: ICD-10-CM

## 2022-07-13 DIAGNOSIS — M76.71 PERONEUS LONGUS TENDONITIS, RIGHT: ICD-10-CM

## 2022-07-13 DIAGNOSIS — M96.0 NONUNION OF SUBTALAR ARTHRODESIS: ICD-10-CM

## 2022-07-13 DIAGNOSIS — M19.071 DJD (DEGENERATIVE JOINT DISEASE), ANKLE AND FOOT, RIGHT: ICD-10-CM

## 2022-07-13 PROCEDURE — 99024 POSTOP FOLLOW-UP VISIT: CPT | Performed by: PODIATRIST

## 2022-07-13 PROCEDURE — 29405 APPL SHORT LEG CAST: CPT | Performed by: PODIATRIST

## 2022-07-13 RX ORDER — HYDROCODONE BITARTRATE AND ACETAMINOPHEN 10; 325 MG/1; MG/1
1 TABLET ORAL EVERY 8 HOURS PRN
Qty: 20 TABLET | Refills: 0 | Status: SHIPPED | OUTPATIENT
Start: 2022-07-13 | End: 2022-08-11 | Stop reason: SDUPTHER

## 2022-07-18 ENCOUNTER — TELEPHONE (OUTPATIENT)
Dept: ORTHOPEDICS | Age: 37
End: 2022-07-18

## 2022-07-19 ENCOUNTER — E-ADVICE (OUTPATIENT)
Dept: ORTHOPEDICS | Age: 37
End: 2022-07-19

## 2022-07-19 DIAGNOSIS — Z98.890 S/P FOOT SURGERY, RIGHT: ICD-10-CM

## 2022-07-19 RX ORDER — HYDROCODONE BITARTRATE AND ACETAMINOPHEN 5; 325 MG/1; MG/1
1 TABLET ORAL EVERY 8 HOURS PRN
Qty: 30 TABLET | Refills: 0 | Status: SHIPPED | OUTPATIENT
Start: 2022-07-19 | End: 2022-07-19 | Stop reason: SDUPTHER

## 2022-07-19 RX ORDER — HYDROCODONE BITARTRATE AND ACETAMINOPHEN 5; 325 MG/1; MG/1
1 TABLET ORAL EVERY 8 HOURS PRN
Qty: 30 TABLET | Refills: 0 | Status: SHIPPED | OUTPATIENT
Start: 2022-07-19 | End: 2022-08-19 | Stop reason: ALTCHOICE

## 2022-07-21 ENCOUNTER — APPOINTMENT (OUTPATIENT)
Dept: PODIATRY | Age: 37
End: 2022-07-21

## 2022-07-22 ENCOUNTER — APPOINTMENT (OUTPATIENT)
Dept: PODIATRY | Age: 37
End: 2022-07-22

## 2022-07-22 ENCOUNTER — E-ADVICE (OUTPATIENT)
Dept: PODIATRY | Age: 37
End: 2022-07-22

## 2022-07-27 ENCOUNTER — IMAGING SERVICES (OUTPATIENT)
Dept: GENERAL RADIOLOGY | Age: 37
End: 2022-07-27
Attending: PEDIATRICS

## 2022-07-27 ENCOUNTER — WORKER'S COMP (OUTPATIENT)
Dept: SPORTS MEDICINE | Age: 37
End: 2022-07-27

## 2022-07-27 ENCOUNTER — TELEPHONE (OUTPATIENT)
Dept: SPORTS MEDICINE | Age: 37
End: 2022-07-27

## 2022-07-27 VITALS
DIASTOLIC BLOOD PRESSURE: 74 MMHG | BODY MASS INDEX: 36.88 KG/M2 | SYSTOLIC BLOOD PRESSURE: 116 MMHG | HEIGHT: 69 IN | WEIGHT: 249 LBS

## 2022-07-27 DIAGNOSIS — M25.561 ACUTE PAIN OF RIGHT KNEE: ICD-10-CM

## 2022-07-27 DIAGNOSIS — M25.461 EFFUSION OF RIGHT KNEE JOINT: Primary | ICD-10-CM

## 2022-07-27 PROCEDURE — 20610 DRAIN/INJ JOINT/BURSA W/O US: CPT | Performed by: PEDIATRICS

## 2022-07-27 PROCEDURE — 99204 OFFICE O/P NEW MOD 45 MIN: CPT | Performed by: PEDIATRICS

## 2022-07-27 PROCEDURE — 73564 X-RAY EXAM KNEE 4 OR MORE: CPT | Performed by: RADIOLOGY

## 2022-07-27 RX ORDER — LIDOCAINE HYDROCHLORIDE 10 MG/ML
3 INJECTION, SOLUTION INFILTRATION; PERINEURAL
Status: COMPLETED | OUTPATIENT
Start: 2022-07-27 | End: 2022-07-27

## 2022-07-27 RX ORDER — ALPRAZOLAM 1 MG/1
1 TABLET ORAL
COMMUNITY
Start: 2022-04-22

## 2022-07-27 RX ADMIN — LIDOCAINE HYDROCHLORIDE 3 ML: 10 INJECTION, SOLUTION INFILTRATION; PERINEURAL at 12:09

## 2022-07-29 ENCOUNTER — TELEPHONE (OUTPATIENT)
Dept: PODIATRY | Age: 37
End: 2022-07-29

## 2022-07-29 ENCOUNTER — WORKER'S COMP (OUTPATIENT)
Dept: PODIATRY | Age: 37
End: 2022-07-29

## 2022-07-29 DIAGNOSIS — M19.071 PRIMARY OSTEOARTHRITIS, RIGHT ANKLE AND FOOT: ICD-10-CM

## 2022-07-29 DIAGNOSIS — M65.9 SYNOVITIS AND TENOSYNOVITIS, UNSPECIFIED: ICD-10-CM

## 2022-07-29 DIAGNOSIS — Z98.890 S/P FOOT SURGERY, RIGHT: ICD-10-CM

## 2022-07-29 DIAGNOSIS — M96.0 NONUNION OF SUBTALAR ARTHRODESIS: ICD-10-CM

## 2022-07-29 DIAGNOSIS — T85.848D PAIN DUE TO ANY DEVICE, IMPLANT OR GRAFT, SUBSEQUENT ENCOUNTER: ICD-10-CM

## 2022-07-29 DIAGNOSIS — T81.42XA INFECTION OF DEEP INCISIONAL SURGICAL SITE AFTER PROCEDURE, INITIAL ENCOUNTER: Primary | ICD-10-CM

## 2022-07-29 DIAGNOSIS — M65.9 SYNOVITIS OF RIGHT ANKLE: ICD-10-CM

## 2022-07-29 DIAGNOSIS — M19.071 DJD (DEGENERATIVE JOINT DISEASE), ANKLE AND FOOT, RIGHT: ICD-10-CM

## 2022-07-29 DIAGNOSIS — M79.661 RIGHT CALF PAIN: ICD-10-CM

## 2022-07-29 DIAGNOSIS — M96.0 PSEUDARTHROSIS AFTER FUSION OR ARTHRODESIS: ICD-10-CM

## 2022-07-29 PROCEDURE — 87075 CULTR BACTERIA EXCEPT BLOOD: CPT | Performed by: PODIATRIST

## 2022-07-29 PROCEDURE — 99223 1ST HOSP IP/OBS HIGH 75: CPT | Performed by: HOSPITALIST

## 2022-07-29 PROCEDURE — 87205 SMEAR GRAM STAIN: CPT | Performed by: PODIATRIST

## 2022-07-29 PROCEDURE — 87077 CULTURE AEROBIC IDENTIFY: CPT | Performed by: PODIATRIST

## 2022-07-29 PROCEDURE — 87070 CULTURE OTHR SPECIMN AEROBIC: CPT | Performed by: PODIATRIST

## 2022-07-29 PROCEDURE — 87186 SC STD MICRODIL/AGAR DIL: CPT | Performed by: PODIATRIST

## 2022-07-29 RX ORDER — HYDROCODONE BITARTRATE AND ACETAMINOPHEN 10; 325 MG/1; MG/1
TABLET ORAL
Qty: 21 TABLET | Refills: 0 | Status: CANCELLED | OUTPATIENT
Start: 2022-07-29

## 2022-07-30 ENCOUNTER — APPOINTMENT (OUTPATIENT)
Dept: OTHER | Facility: PHYSICIAN GROUP | Age: 37
End: 2022-07-30

## 2022-07-30 ENCOUNTER — EXTERNAL RECORD (OUTPATIENT)
Dept: HEALTH INFORMATION MANAGEMENT | Facility: OTHER | Age: 37
End: 2022-07-30

## 2022-07-30 PROCEDURE — 99232 SBSQ HOSP IP/OBS MODERATE 35: CPT | Performed by: HOSPITALIST

## 2022-07-30 PROCEDURE — 28003 TREATMENT OF FOOT INFECTION: CPT | Performed by: PODIATRIST

## 2022-07-31 PROCEDURE — 99232 SBSQ HOSP IP/OBS MODERATE 35: CPT | Performed by: HOSPITALIST

## 2022-07-31 PROCEDURE — 99024 POSTOP FOLLOW-UP VISIT: CPT | Performed by: PODIATRIST

## 2022-08-01 LAB — FINAL REPORT: ABNORMAL

## 2022-08-01 PROCEDURE — 99024 POSTOP FOLLOW-UP VISIT: CPT | Performed by: PODIATRIST

## 2022-08-01 PROCEDURE — 99233 SBSQ HOSP IP/OBS HIGH 50: CPT | Performed by: HOSPITALIST

## 2022-08-02 PROCEDURE — 99232 SBSQ HOSP IP/OBS MODERATE 35: CPT | Performed by: HOSPITALIST

## 2022-08-02 PROCEDURE — 99024 POSTOP FOLLOW-UP VISIT: CPT | Performed by: PODIATRIST

## 2022-08-03 LAB — FINAL REPORT: NORMAL

## 2022-08-03 PROCEDURE — 99232 SBSQ HOSP IP/OBS MODERATE 35: CPT | Performed by: HOSPITALIST

## 2022-08-04 PROCEDURE — 99024 POSTOP FOLLOW-UP VISIT: CPT | Performed by: PODIATRIST

## 2022-08-04 PROCEDURE — 99232 SBSQ HOSP IP/OBS MODERATE 35: CPT | Performed by: HOSPITALIST

## 2022-08-05 PROCEDURE — 99239 HOSP IP/OBS DSCHRG MGMT >30: CPT | Performed by: HOSPITALIST

## 2022-08-08 ENCOUNTER — TELEPHONE (OUTPATIENT)
Dept: INFECTIOUS DISEASES | Age: 37
End: 2022-08-08

## 2022-08-09 ENCOUNTER — TELEPHONE (OUTPATIENT)
Dept: INFECTIOUS DISEASES | Age: 37
End: 2022-08-09

## 2022-08-10 ENCOUNTER — TELEPHONE (OUTPATIENT)
Dept: ORTHOPEDICS | Age: 37
End: 2022-08-10

## 2022-08-11 ENCOUNTER — TELEPHONE (OUTPATIENT)
Dept: PODIATRY | Age: 37
End: 2022-08-11

## 2022-08-11 ENCOUNTER — TELEPHONE (OUTPATIENT)
Dept: INFECTIOUS DISEASES | Age: 37
End: 2022-08-11

## 2022-08-11 DIAGNOSIS — Z98.890 S/P FOOT SURGERY, RIGHT: ICD-10-CM

## 2022-08-11 RX ORDER — HYDROCODONE BITARTRATE AND ACETAMINOPHEN 10; 325 MG/1; MG/1
1 TABLET ORAL EVERY 8 HOURS PRN
Qty: 20 TABLET | Refills: 0 | Status: SHIPPED | OUTPATIENT
Start: 2022-08-11 | End: 2022-08-11 | Stop reason: SDUPTHER

## 2022-08-11 RX ORDER — HYDROCODONE BITARTRATE AND ACETAMINOPHEN 7.5; 325 MG/1; MG/1
TABLET ORAL
Qty: 21 TABLET | Refills: 0 | Status: SHIPPED | OUTPATIENT
Start: 2022-08-11 | End: 2022-08-19 | Stop reason: ALTCHOICE

## 2022-08-11 RX ORDER — HYDROCODONE BITARTRATE AND ACETAMINOPHEN 7.5; 325 MG/1; MG/1
TABLET ORAL
Qty: 14 TABLET | Refills: 0 | Status: CANCELLED | OUTPATIENT
Start: 2022-08-11

## 2022-08-12 RX ORDER — HYDROCODONE BITARTRATE AND ACETAMINOPHEN 10; 325 MG/1; MG/1
1 TABLET ORAL EVERY 8 HOURS PRN
Qty: 20 TABLET | Refills: 0 | Status: SHIPPED | OUTPATIENT
Start: 2022-08-12 | End: 2022-08-19 | Stop reason: ALTCHOICE

## 2022-08-15 ENCOUNTER — TELEPHONE (OUTPATIENT)
Dept: PODIATRY | Age: 37
End: 2022-08-15

## 2022-08-15 ENCOUNTER — APPOINTMENT (OUTPATIENT)
Dept: PHYSICAL THERAPY | Age: 37
End: 2022-08-15

## 2022-08-16 ENCOUNTER — EXTERNAL RECORD (OUTPATIENT)
Dept: HEALTH INFORMATION MANAGEMENT | Facility: OTHER | Age: 37
End: 2022-08-16

## 2022-08-16 ENCOUNTER — APPOINTMENT (OUTPATIENT)
Dept: PHYSICAL THERAPY | Age: 37
End: 2022-08-16

## 2022-08-17 ENCOUNTER — APPOINTMENT (OUTPATIENT)
Dept: SPORTS MEDICINE | Age: 37
End: 2022-08-17

## 2022-08-17 ENCOUNTER — APPOINTMENT (OUTPATIENT)
Dept: PHYSICAL THERAPY | Age: 37
End: 2022-08-17

## 2022-08-18 ENCOUNTER — APPOINTMENT (OUTPATIENT)
Dept: PHYSICAL THERAPY | Age: 37
End: 2022-08-18

## 2022-08-19 ENCOUNTER — WORKER'S COMP (OUTPATIENT)
Dept: PODIATRY | Age: 37
End: 2022-08-19

## 2022-08-19 DIAGNOSIS — T81.31XD DEHISCENCE OF OPERATIVE WOUND, SUBSEQUENT ENCOUNTER: ICD-10-CM

## 2022-08-19 DIAGNOSIS — Z98.890 S/P FOOT SURGERY, RIGHT: Primary | ICD-10-CM

## 2022-08-19 DIAGNOSIS — T81.42XA INFECTION OF DEEP INCISIONAL SURGICAL SITE AFTER PROCEDURE, INITIAL ENCOUNTER: ICD-10-CM

## 2022-08-19 PROCEDURE — 99024 POSTOP FOLLOW-UP VISIT: CPT | Performed by: PODIATRIST

## 2022-08-19 RX ORDER — MORPHINE SULFATE 15 MG/1
TABLET, FILM COATED, EXTENDED RELEASE ORAL
COMMUNITY
Start: 2022-08-05 | End: 2022-10-19 | Stop reason: ALTCHOICE

## 2022-08-19 RX ORDER — RIFAMPIN 300 MG/1
300 CAPSULE ORAL EVERY 12 HOURS
COMMUNITY
Start: 2022-08-04

## 2022-08-19 RX ORDER — CEFAZOLIN SODIUM 2 G/100ML
2 INJECTION, SOLUTION INTRAVENOUS
COMMUNITY
Start: 2022-08-05 | End: 2022-09-02

## 2022-08-19 RX ORDER — ACETAMINOPHEN 500 MG
500 TABLET ORAL
COMMUNITY
Start: 2022-06-21 | End: 2023-08-23 | Stop reason: ALTCHOICE

## 2022-08-19 RX ORDER — HYDROCODONE BITARTRATE AND ACETAMINOPHEN 5; 325 MG/1; MG/1
1 TABLET ORAL EVERY 6 HOURS PRN
Qty: 30 TABLET | Refills: 0 | Status: SHIPPED | OUTPATIENT
Start: 2022-08-19 | End: 2022-10-19 | Stop reason: SDUPTHER

## 2022-08-22 ENCOUNTER — TELEPHONE (OUTPATIENT)
Dept: PODIATRY | Age: 37
End: 2022-08-22

## 2022-08-22 ENCOUNTER — APPOINTMENT (OUTPATIENT)
Dept: PHYSICAL THERAPY | Age: 37
End: 2022-08-22

## 2022-08-23 ENCOUNTER — APPOINTMENT (OUTPATIENT)
Dept: PHYSICAL THERAPY | Age: 37
End: 2022-08-23

## 2022-08-24 ENCOUNTER — APPOINTMENT (OUTPATIENT)
Dept: PHYSICAL THERAPY | Age: 37
End: 2022-08-24

## 2022-08-25 ENCOUNTER — TELEPHONE (OUTPATIENT)
Dept: ORTHOPEDICS | Age: 37
End: 2022-08-25

## 2022-08-25 ENCOUNTER — APPOINTMENT (OUTPATIENT)
Dept: PHYSICAL THERAPY | Age: 37
End: 2022-08-25

## 2022-08-29 ENCOUNTER — APPOINTMENT (OUTPATIENT)
Dept: PHYSICAL THERAPY | Age: 37
End: 2022-08-29

## 2022-08-30 ENCOUNTER — APPOINTMENT (OUTPATIENT)
Dept: PHYSICAL THERAPY | Age: 37
End: 2022-08-30

## 2022-08-31 ENCOUNTER — APPOINTMENT (OUTPATIENT)
Dept: PHYSICAL THERAPY | Age: 37
End: 2022-08-31

## 2022-09-01 ENCOUNTER — APPOINTMENT (OUTPATIENT)
Dept: PHYSICAL THERAPY | Age: 37
End: 2022-09-01

## 2022-09-02 ENCOUNTER — WORKER'S COMP (OUTPATIENT)
Dept: PODIATRY | Age: 37
End: 2022-09-02

## 2022-09-02 DIAGNOSIS — T81.42XA INFECTION OF DEEP INCISIONAL SURGICAL SITE AFTER PROCEDURE, INITIAL ENCOUNTER: ICD-10-CM

## 2022-09-02 DIAGNOSIS — Z98.890 S/P FOOT SURGERY, RIGHT: Primary | ICD-10-CM

## 2022-09-02 DIAGNOSIS — T85.848D PAIN DUE TO ANY DEVICE, IMPLANT OR GRAFT, SUBSEQUENT ENCOUNTER: ICD-10-CM

## 2022-09-02 DIAGNOSIS — M65.9 SYNOVITIS OF RIGHT ANKLE: ICD-10-CM

## 2022-09-02 DIAGNOSIS — M19.071 DJD (DEGENERATIVE JOINT DISEASE), ANKLE AND FOOT, RIGHT: ICD-10-CM

## 2022-09-02 DIAGNOSIS — M96.0 NONUNION OF SUBTALAR ARTHRODESIS: ICD-10-CM

## 2022-09-02 DIAGNOSIS — T81.31XD DEHISCENCE OF OPERATIVE WOUND, SUBSEQUENT ENCOUNTER: ICD-10-CM

## 2022-09-02 PROCEDURE — 99024 POSTOP FOLLOW-UP VISIT: CPT | Performed by: PODIATRIST

## 2022-09-02 RX ORDER — HYDROCODONE BITARTRATE AND ACETAMINOPHEN 5; 325 MG/1; MG/1
TABLET ORAL
Qty: 30 TABLET | Refills: 0 | Status: SHIPPED | OUTPATIENT
Start: 2022-09-02 | End: 2022-10-19 | Stop reason: SDUPTHER

## 2022-09-06 ENCOUNTER — APPOINTMENT (OUTPATIENT)
Dept: PHYSICAL THERAPY | Age: 37
End: 2022-09-06

## 2022-09-06 ENCOUNTER — TELEPHONE (OUTPATIENT)
Dept: PODIATRY | Age: 37
End: 2022-09-06

## 2022-09-07 ENCOUNTER — APPOINTMENT (OUTPATIENT)
Dept: PHYSICAL THERAPY | Age: 37
End: 2022-09-07

## 2022-09-08 ENCOUNTER — APPOINTMENT (OUTPATIENT)
Dept: PHYSICAL THERAPY | Age: 37
End: 2022-09-08

## 2022-09-09 ENCOUNTER — APPOINTMENT (OUTPATIENT)
Dept: PHYSICAL THERAPY | Age: 37
End: 2022-09-09

## 2022-09-09 ENCOUNTER — TELEPHONE (OUTPATIENT)
Dept: INFECTIOUS DISEASES | Age: 37
End: 2022-09-09

## 2022-09-12 ENCOUNTER — WORKER'S COMP (OUTPATIENT)
Dept: PHYSICAL THERAPY | Age: 37
End: 2022-09-12
Attending: PODIATRIST

## 2022-09-12 ENCOUNTER — TELEPHONE (OUTPATIENT)
Dept: INFECTIOUS DISEASES | Age: 37
End: 2022-09-12

## 2022-09-12 DIAGNOSIS — Z98.1 STATUS POST ANKLE FUSION: ICD-10-CM

## 2022-09-12 DIAGNOSIS — R26.9 GAIT DIFFICULTY: ICD-10-CM

## 2022-09-12 DIAGNOSIS — S92.131P: Primary | ICD-10-CM

## 2022-09-12 DIAGNOSIS — M25.571 ACUTE RIGHT ANKLE PAIN: ICD-10-CM

## 2022-09-12 DIAGNOSIS — L08.9 RIGHT FOOT INFECTION: ICD-10-CM

## 2022-09-12 PROCEDURE — 97162 PT EVAL MOD COMPLEX 30 MIN: CPT | Performed by: PHYSICAL THERAPIST

## 2022-09-12 PROCEDURE — 97112 NEUROMUSCULAR REEDUCATION: CPT | Performed by: PHYSICAL THERAPIST

## 2022-09-12 ASSESSMENT — MOVEMENT AND STRENGTH ASSESSMENTS
PERFORMING LIGHT ACTIVITES AROUND YOUR HOME: QUITE A BIT OF DIFFICULTY
SITTING FOR 1 HOUR: QUITE A BIT OF DIFFICULTY
ROLLING OVER IN BED: QUITE A BIT OF DIFFICULTY
GETTING INTO OR OUT OF A CAR: QUITE A BIT OF DIFFICULTY
YOUR USUAL HOBBIES, RECREATIONAL OR SPORTING ACTIVIITIES: EXTREME DIFFICULTY OR UNABLE TO PERFORM ACTIVITY
GOING UP OR DOWN 10 STAIRS (ABOUT 1 FLIGHT OF STAIRS): EXTREME DIFFICULTY OR UNABLE TO PERFORM ACTIVITY
HOPPING: EXTREME DIFFICULTY OR UNABLE TO PERFORM ACTIVITY
PERFORMING HEAVY ACTIVITIES AROUND YOUR HOME: EXTREME DIFFICULTY OR UNABLE TO PERFORM ACTIVITY
RUNNING ON EVEN GROUND: EXTREME DIFFICULTY OR UNABLE TO PERFORM ACTIVITY
TOTAL SCORE: 8.75
WALKING 2 BLOCKS: EXTREME DIFFICULTY OR UNABLE TO PERFORM ACTIVITY
MAKING SHARP TURNS WHILE RUNNING FAST: EXTREME DIFFICULTY OR UNABLE TO PERFORM ACTIVITY
RUNNING ON UNEVEN GROUND: EXTREME DIFFICULTY OR UNABLE TO PERFORM ACTIVITY
WALKING BETWEEN ROOMS: QUITE A BIT OF DIFFICULTY
PUTTING ON YOUR SHOES OR SOCKS: QUITE A BIT OF DIFFICULTY
STANDING FOR 1 HOUR: EXTREME DIFFICULTY OR UNABLE TO PERFORM ACTIVITY
ANY OF YOUR USUAL WORK, HOUSEWORK OR SCHOOL ACTIVITIES: QUITE A BIT OF DIFFICULTY
SQUATTING: EXTREME DIFFICULTY OR UNABLE TO PERFORM ACTIVITY
GETTING INTO OR OUT OF THE BATH: EXTREME DIFFICULTY OR UNABLE TO PERFORM ACTIVITY
WALKING A MILE: EXTREME DIFFICULTY OR UNABLE TO PERFORM ACTIVITY
LIFTING AN OBJECT, LIKE A BAG OF GROCERIES, FROM THE FLOOR: EXTREME DIFFICULTY OR UNABLE TO PERFORM ACTIVITY

## 2022-09-12 ASSESSMENT — ENCOUNTER SYMPTOMS
ALLEVIATING FACTORS: REST
QUALITY: ACHE
SUBJECTIVE PAIN PROGRESSION: NO CHANGE
ALLEVIATING FACTORS: TOPICAL AGENTS/PATCH
ALLEVIATING FACTORS: SUPPORTIVE DEVICE
PAIN SCALE AT HIGHEST: 8
ALLEVIATING FACTORS: ICE
PAIN SEVERITY NOW: 6
PAIN SCALE AT LOWEST: 6
ALLEVIATING FACTORS: PRESCRIPTION MEDICATIONS
PAIN FREQUENCY: CONSTANT
QUALITY: DISCOMFORT
ALLEVIATING FACTORS: AVOIDING MOVEMENT IN INVOLVED AREA
QUALITY: SHARP
ALLEVIATING FACTORS: CHANGE IN POSITION
QUALITY: STIFF
QUALITY: THROBBING

## 2022-09-13 ENCOUNTER — OFFICE VISIT (OUTPATIENT)
Dept: INFECTIOUS DISEASES | Age: 37
End: 2022-09-13

## 2022-09-13 ENCOUNTER — TELEPHONE (OUTPATIENT)
Dept: INFECTIOUS DISEASES | Age: 37
End: 2022-09-13

## 2022-09-13 VITALS
TEMPERATURE: 97.9 F | BODY MASS INDEX: 34.7 KG/M2 | HEART RATE: 88 BPM | WEIGHT: 235 LBS | DIASTOLIC BLOOD PRESSURE: 70 MMHG | SYSTOLIC BLOOD PRESSURE: 116 MMHG

## 2022-09-13 DIAGNOSIS — A49.01 MSSA INFECTION, NON-INVASIVE: ICD-10-CM

## 2022-09-13 DIAGNOSIS — L08.9 RIGHT FOOT INFECTION: Primary | ICD-10-CM

## 2022-09-13 PROCEDURE — 99244 OFF/OP CNSLTJ NEW/EST MOD 40: CPT | Performed by: INTERNAL MEDICINE

## 2022-09-13 RX ORDER — LACTOBACIL 2/BIFIDO 1/S.THERMO 450B CELL
1 PACKET (EA) ORAL EVERY 12 HOURS
COMMUNITY
Start: 2022-08-05

## 2022-09-13 RX ORDER — ZINC SULFATE 50(220)MG
220 CAPSULE ORAL DAILY
COMMUNITY
Start: 2022-08-05

## 2022-09-14 ENCOUNTER — TELEPHONE (OUTPATIENT)
Dept: PODIATRY | Age: 37
End: 2022-09-14

## 2022-09-15 ENCOUNTER — WORKER'S COMP (OUTPATIENT)
Dept: PHYSICAL THERAPY | Age: 37
End: 2022-09-15
Attending: PODIATRIST

## 2022-09-15 DIAGNOSIS — R26.9 GAIT DIFFICULTY: ICD-10-CM

## 2022-09-15 DIAGNOSIS — Z98.1 STATUS POST ANKLE FUSION: ICD-10-CM

## 2022-09-15 DIAGNOSIS — M25.571 ACUTE RIGHT ANKLE PAIN: ICD-10-CM

## 2022-09-15 DIAGNOSIS — S92.131P: Primary | ICD-10-CM

## 2022-09-15 DIAGNOSIS — L08.9 RIGHT FOOT INFECTION: ICD-10-CM

## 2022-09-15 PROCEDURE — 97112 NEUROMUSCULAR REEDUCATION: CPT | Performed by: PHYSICAL THERAPIST

## 2022-09-15 PROCEDURE — 97140 MANUAL THERAPY 1/> REGIONS: CPT | Performed by: PHYSICAL THERAPIST

## 2022-09-15 PROCEDURE — 97110 THERAPEUTIC EXERCISES: CPT | Performed by: PHYSICAL THERAPIST

## 2022-09-20 ENCOUNTER — APPOINTMENT (OUTPATIENT)
Dept: PHYSICAL THERAPY | Age: 37
End: 2022-09-20
Attending: PODIATRIST

## 2022-09-20 ENCOUNTER — WORKER'S COMP (OUTPATIENT)
Dept: PHYSICAL THERAPY | Age: 37
End: 2022-09-20
Attending: PODIATRIST

## 2022-09-20 DIAGNOSIS — R26.9 GAIT DIFFICULTY: ICD-10-CM

## 2022-09-20 DIAGNOSIS — L08.9 RIGHT FOOT INFECTION: ICD-10-CM

## 2022-09-20 DIAGNOSIS — M25.571 ACUTE RIGHT ANKLE PAIN: ICD-10-CM

## 2022-09-20 DIAGNOSIS — Z98.1 STATUS POST ANKLE FUSION: ICD-10-CM

## 2022-09-20 DIAGNOSIS — S92.131P: Primary | ICD-10-CM

## 2022-09-20 PROCEDURE — 97140 MANUAL THERAPY 1/> REGIONS: CPT | Performed by: PHYSICAL THERAPIST

## 2022-09-20 PROCEDURE — 97014 ELECTRIC STIMULATION THERAPY: CPT | Performed by: PHYSICAL THERAPIST

## 2022-09-20 PROCEDURE — 97112 NEUROMUSCULAR REEDUCATION: CPT | Performed by: PHYSICAL THERAPIST

## 2022-09-20 PROCEDURE — 97110 THERAPEUTIC EXERCISES: CPT | Performed by: PHYSICAL THERAPIST

## 2022-09-21 ENCOUNTER — OFFICE VISIT (OUTPATIENT)
Dept: PODIATRY | Age: 37
End: 2022-09-21

## 2022-09-21 ENCOUNTER — IMAGING SERVICES (OUTPATIENT)
Dept: GENERAL RADIOLOGY | Age: 37
End: 2022-09-21
Attending: PODIATRIST

## 2022-09-21 DIAGNOSIS — T81.31XD DEHISCENCE OF OPERATIVE WOUND, SUBSEQUENT ENCOUNTER: ICD-10-CM

## 2022-09-21 DIAGNOSIS — M96.0 NONUNION OF SUBTALAR ARTHRODESIS: ICD-10-CM

## 2022-09-21 DIAGNOSIS — M65.9 SYNOVITIS OF RIGHT ANKLE: ICD-10-CM

## 2022-09-21 DIAGNOSIS — M76.821 POSTERIOR TIBIAL TENDONITIS, RIGHT: ICD-10-CM

## 2022-09-21 DIAGNOSIS — T81.42XD INFECTION OF DEEP INCISIONAL SURGICAL SITE AFTER PROCEDURE, SUBSEQUENT ENCOUNTER: ICD-10-CM

## 2022-09-21 DIAGNOSIS — Z98.890 S/P FOOT SURGERY, RIGHT: Primary | ICD-10-CM

## 2022-09-21 DIAGNOSIS — Z98.890 S/P FOOT SURGERY, RIGHT: ICD-10-CM

## 2022-09-21 PROCEDURE — 99024 POSTOP FOLLOW-UP VISIT: CPT | Performed by: PODIATRIST

## 2022-09-21 PROCEDURE — 73610 X-RAY EXAM OF ANKLE: CPT | Performed by: RADIOLOGY

## 2022-09-21 RX ORDER — HYDROCODONE BITARTRATE AND ACETAMINOPHEN 5; 325 MG/1; MG/1
TABLET ORAL
Qty: 20 TABLET | Refills: 0 | Status: SHIPPED | OUTPATIENT
Start: 2022-09-21 | End: 2022-11-15 | Stop reason: SDUPTHER

## 2022-09-23 ENCOUNTER — WORKER'S COMP (OUTPATIENT)
Dept: PHYSICAL THERAPY | Age: 37
End: 2022-09-23
Attending: PODIATRIST

## 2022-09-23 DIAGNOSIS — M25.571 ACUTE RIGHT ANKLE PAIN: ICD-10-CM

## 2022-09-23 DIAGNOSIS — R26.9 GAIT DIFFICULTY: ICD-10-CM

## 2022-09-23 DIAGNOSIS — Z98.1 STATUS POST ANKLE FUSION: ICD-10-CM

## 2022-09-23 DIAGNOSIS — S92.131P: Primary | ICD-10-CM

## 2022-09-23 DIAGNOSIS — L08.9 RIGHT FOOT INFECTION: ICD-10-CM

## 2022-09-23 PROCEDURE — 97112 NEUROMUSCULAR REEDUCATION: CPT | Performed by: PHYSICAL THERAPIST

## 2022-09-23 PROCEDURE — 97110 THERAPEUTIC EXERCISES: CPT | Performed by: PHYSICAL THERAPIST

## 2022-09-23 PROCEDURE — 97140 MANUAL THERAPY 1/> REGIONS: CPT | Performed by: PHYSICAL THERAPIST

## 2022-09-26 ENCOUNTER — APPOINTMENT (OUTPATIENT)
Dept: PHYSICAL THERAPY | Age: 37
End: 2022-09-26
Attending: PODIATRIST

## 2022-09-28 ENCOUNTER — WORKER'S COMP (OUTPATIENT)
Dept: PHYSICAL THERAPY | Age: 37
End: 2022-09-28
Attending: PODIATRIST

## 2022-09-28 DIAGNOSIS — Z98.1 STATUS POST ANKLE FUSION: ICD-10-CM

## 2022-09-28 DIAGNOSIS — M25.571 ACUTE RIGHT ANKLE PAIN: ICD-10-CM

## 2022-09-28 DIAGNOSIS — S92.131P: Primary | ICD-10-CM

## 2022-09-28 DIAGNOSIS — L08.9 RIGHT FOOT INFECTION: ICD-10-CM

## 2022-09-28 DIAGNOSIS — R26.9 GAIT DIFFICULTY: ICD-10-CM

## 2022-09-28 PROCEDURE — 97110 THERAPEUTIC EXERCISES: CPT | Performed by: PHYSICAL THERAPIST

## 2022-09-28 PROCEDURE — 97140 MANUAL THERAPY 1/> REGIONS: CPT | Performed by: PHYSICAL THERAPIST

## 2022-09-28 PROCEDURE — 97112 NEUROMUSCULAR REEDUCATION: CPT | Performed by: PHYSICAL THERAPIST

## 2022-10-03 ENCOUNTER — APPOINTMENT (OUTPATIENT)
Dept: PHYSICAL THERAPY | Age: 37
End: 2022-10-03
Attending: PODIATRIST

## 2022-10-05 ENCOUNTER — APPOINTMENT (OUTPATIENT)
Dept: PHYSICAL THERAPY | Age: 37
End: 2022-10-05
Attending: PODIATRIST

## 2022-10-19 ENCOUNTER — WORKER'S COMP (OUTPATIENT)
Dept: PODIATRY | Age: 37
End: 2022-10-19

## 2022-10-19 ENCOUNTER — TELEPHONE (OUTPATIENT)
Dept: PODIATRY | Age: 37
End: 2022-10-19

## 2022-10-19 DIAGNOSIS — M65.9 SYNOVITIS OF RIGHT ANKLE: ICD-10-CM

## 2022-10-19 DIAGNOSIS — T81.42XA INFECTION OF DEEP INCISIONAL SURGICAL SITE AFTER PROCEDURE, INITIAL ENCOUNTER: ICD-10-CM

## 2022-10-19 DIAGNOSIS — M76.821 POSTERIOR TIBIAL TENDONITIS, RIGHT: ICD-10-CM

## 2022-10-19 DIAGNOSIS — M96.0 NONUNION OF SUBTALAR ARTHRODESIS: ICD-10-CM

## 2022-10-19 DIAGNOSIS — Z98.890 S/P FOOT SURGERY, RIGHT: Primary | ICD-10-CM

## 2022-10-19 PROCEDURE — 99213 OFFICE O/P EST LOW 20 MIN: CPT | Performed by: PODIATRIST

## 2022-11-01 ENCOUNTER — OFFICE VISIT (OUTPATIENT)
Dept: INFECTIOUS DISEASES | Age: 37
End: 2022-11-01

## 2022-11-01 VITALS
BODY MASS INDEX: 34.7 KG/M2 | WEIGHT: 235 LBS | TEMPERATURE: 98.8 F | HEART RATE: 76 BPM | SYSTOLIC BLOOD PRESSURE: 138 MMHG | DIASTOLIC BLOOD PRESSURE: 70 MMHG

## 2022-11-01 DIAGNOSIS — L08.9 RIGHT FOOT INFECTION: Primary | ICD-10-CM

## 2022-11-01 PROCEDURE — 99213 OFFICE O/P EST LOW 20 MIN: CPT | Performed by: INTERNAL MEDICINE

## 2022-11-07 ENCOUNTER — IMAGING SERVICES (OUTPATIENT)
Dept: CT IMAGING | Age: 37
End: 2022-11-07
Attending: PODIATRIST

## 2022-11-07 DIAGNOSIS — M96.0 NONUNION OF SUBTALAR ARTHRODESIS: ICD-10-CM

## 2022-11-07 DIAGNOSIS — Z98.890 S/P FOOT SURGERY, RIGHT: ICD-10-CM

## 2022-11-07 PROCEDURE — G1004 CDSM NDSC: HCPCS | Performed by: RADIOLOGY

## 2022-11-07 PROCEDURE — 73700 CT LOWER EXTREMITY W/O DYE: CPT | Performed by: RADIOLOGY

## 2022-11-09 ENCOUNTER — IMAGING SERVICES (OUTPATIENT)
Dept: MRI IMAGING | Age: 37
End: 2022-11-09
Attending: PEDIATRICS

## 2022-11-09 ENCOUNTER — TELEPHONE (OUTPATIENT)
Dept: SPORTS MEDICINE | Age: 37
End: 2022-11-09

## 2022-11-09 DIAGNOSIS — M25.561 ACUTE PAIN OF RIGHT KNEE: ICD-10-CM

## 2022-11-09 DIAGNOSIS — M25.461 EFFUSION OF RIGHT KNEE JOINT: Primary | ICD-10-CM

## 2022-11-09 DIAGNOSIS — M25.461 EFFUSION OF RIGHT KNEE JOINT: ICD-10-CM

## 2022-11-09 PROCEDURE — 73721 MRI JNT OF LWR EXTRE W/O DYE: CPT | Performed by: RADIOLOGY

## 2022-11-09 PROCEDURE — G1004 CDSM NDSC: HCPCS | Performed by: RADIOLOGY

## 2022-11-11 ENCOUNTER — APPOINTMENT (OUTPATIENT)
Dept: SPORTS MEDICINE | Age: 37
End: 2022-11-11

## 2022-11-11 ENCOUNTER — TELEPHONE (OUTPATIENT)
Dept: INFECTIOUS DISEASES | Age: 37
End: 2022-11-11

## 2022-11-15 ENCOUNTER — TELEPHONE (OUTPATIENT)
Dept: INFECTIOUS DISEASES | Age: 37
End: 2022-11-15

## 2022-11-15 RX ORDER — HYDROCODONE BITARTRATE AND ACETAMINOPHEN 5; 325 MG/1; MG/1
1 TABLET ORAL EVERY 6 HOURS PRN
Qty: 24 TABLET | Refills: 0 | Status: SHIPPED | OUTPATIENT
Start: 2022-11-15 | End: 2022-12-08 | Stop reason: SDUPTHER

## 2022-11-15 RX ORDER — HYDROCODONE BITARTRATE AND ACETAMINOPHEN 5; 325 MG/1; MG/1
1 TABLET ORAL EVERY 6 HOURS PRN
Qty: 24 TABLET | Refills: 0 | Status: SHIPPED | OUTPATIENT
Start: 2022-11-15 | End: 2022-11-15 | Stop reason: SDUPTHER

## 2022-11-15 RX ORDER — HYDROCODONE BITARTRATE AND ACETAMINOPHEN 5; 325 MG/1; MG/1
1 TABLET ORAL EVERY 6 HOURS PRN
Qty: 28 TABLET | Refills: 0 | Status: SHIPPED | OUTPATIENT
Start: 2022-11-15 | End: 2022-11-15 | Stop reason: SDUPTHER

## 2022-11-17 ENCOUNTER — TELEPHONE (OUTPATIENT)
Dept: PAIN MANAGEMENT | Age: 37
End: 2022-11-17

## 2022-11-17 ENCOUNTER — TELEPHONE (OUTPATIENT)
Dept: INFECTIOUS DISEASES | Age: 37
End: 2022-11-17

## 2022-11-17 DIAGNOSIS — M19.071 DJD (DEGENERATIVE JOINT DISEASE), ANKLE AND FOOT, RIGHT: ICD-10-CM

## 2022-11-17 DIAGNOSIS — M65.9 SYNOVITIS OF RIGHT ANKLE: ICD-10-CM

## 2022-11-17 DIAGNOSIS — S92.135P: ICD-10-CM

## 2022-11-17 DIAGNOSIS — M96.0 NONUNION OF SUBTALAR ARTHRODESIS: ICD-10-CM

## 2022-11-17 DIAGNOSIS — Z98.890 S/P FOOT SURGERY, RIGHT: Primary | ICD-10-CM

## 2022-11-22 DIAGNOSIS — M25.461 EFFUSION OF RIGHT KNEE JOINT: Primary | ICD-10-CM

## 2022-12-07 ENCOUNTER — E-ADVICE (OUTPATIENT)
Dept: PODIATRY | Age: 37
End: 2022-12-07

## 2022-12-08 DIAGNOSIS — M65.9 SYNOVITIS OF RIGHT ANKLE: ICD-10-CM

## 2022-12-08 RX ORDER — HYDROCODONE BITARTRATE AND ACETAMINOPHEN 5; 325 MG/1; MG/1
1 TABLET ORAL EVERY 6 HOURS PRN
Qty: 24 TABLET | Refills: 0 | Status: SHIPPED | OUTPATIENT
Start: 2022-12-08 | End: 2022-12-21 | Stop reason: SDUPTHER

## 2022-12-12 ENCOUNTER — TELEPHONE (OUTPATIENT)
Dept: SPORTS MEDICINE | Age: 37
End: 2022-12-12

## 2022-12-12 ENCOUNTER — APPOINTMENT (OUTPATIENT)
Dept: MRI IMAGING | Age: 37
End: 2022-12-12
Attending: PEDIATRICS

## 2022-12-12 ENCOUNTER — APPOINTMENT (OUTPATIENT)
Dept: INTERVENTIONAL RADIOLOGY/VASCULAR | Age: 37
End: 2022-12-12
Attending: PEDIATRICS

## 2022-12-12 DIAGNOSIS — M25.561 ACUTE PAIN OF RIGHT KNEE: ICD-10-CM

## 2022-12-12 DIAGNOSIS — M25.461 EFFUSION OF RIGHT KNEE JOINT: Primary | ICD-10-CM

## 2022-12-21 ENCOUNTER — IMAGING SERVICES (OUTPATIENT)
Dept: GENERAL RADIOLOGY | Age: 37
End: 2022-12-21
Attending: PODIATRIST

## 2022-12-21 ENCOUNTER — WORKER'S COMP (OUTPATIENT)
Dept: PODIATRY | Age: 37
End: 2022-12-21

## 2022-12-21 DIAGNOSIS — Z98.890 S/P FOOT SURGERY, RIGHT: Primary | ICD-10-CM

## 2022-12-21 DIAGNOSIS — T85.848D PAIN DUE TO ANY DEVICE, IMPLANT OR GRAFT, SUBSEQUENT ENCOUNTER: ICD-10-CM

## 2022-12-21 DIAGNOSIS — Z98.890 S/P FOOT SURGERY, RIGHT: ICD-10-CM

## 2022-12-21 DIAGNOSIS — M96.0 NONUNION OF SUBTALAR ARTHRODESIS: ICD-10-CM

## 2022-12-21 DIAGNOSIS — M19.071 DJD (DEGENERATIVE JOINT DISEASE), ANKLE AND FOOT, RIGHT: ICD-10-CM

## 2022-12-21 DIAGNOSIS — M76.821 POSTERIOR TIBIAL TENDONITIS, RIGHT: ICD-10-CM

## 2022-12-21 DIAGNOSIS — M65.9 SYNOVITIS OF RIGHT ANKLE: ICD-10-CM

## 2022-12-21 PROCEDURE — 99214 OFFICE O/P EST MOD 30 MIN: CPT | Performed by: PODIATRIST

## 2022-12-21 PROCEDURE — 73610 X-RAY EXAM OF ANKLE: CPT | Performed by: RADIOLOGY

## 2022-12-21 RX ORDER — HYDROCODONE BITARTRATE AND ACETAMINOPHEN 5; 325 MG/1; MG/1
1 TABLET ORAL EVERY 6 HOURS PRN
Qty: 24 TABLET | Refills: 0 | Status: SHIPPED | OUTPATIENT
Start: 2022-12-21

## 2022-12-26 ENCOUNTER — OFFICE VISIT (OUTPATIENT)
Dept: FAMILY MEDICINE CLINIC | Facility: CLINIC | Age: 37
End: 2022-12-26
Payer: COMMERCIAL

## 2022-12-26 VITALS
HEART RATE: 95 BPM | DIASTOLIC BLOOD PRESSURE: 76 MMHG | SYSTOLIC BLOOD PRESSURE: 117 MMHG | TEMPERATURE: 98 F | WEIGHT: 224.63 LBS | BODY MASS INDEX: 33 KG/M2 | OXYGEN SATURATION: 97 % | RESPIRATION RATE: 18 BRPM

## 2022-12-26 DIAGNOSIS — J11.1 INFLUENZA-LIKE ILLNESS: Primary | ICD-10-CM

## 2022-12-26 DIAGNOSIS — J02.9 SORE THROAT: ICD-10-CM

## 2022-12-26 LAB
CONTROL LINE PRESENT WITH A CLEAR BACKGROUND (YES/NO): YES YES/NO
STREP GRP A CUL-SCR: NEGATIVE

## 2022-12-26 PROCEDURE — 87637 SARSCOV2&INF A&B&RSV AMP PRB: CPT | Performed by: PHYSICIAN ASSISTANT

## 2022-12-26 RX ORDER — BALOXAVIR MARBOXIL 80 MG/1
80 TABLET, FILM COATED ORAL ONCE
Qty: 1 EACH | Refills: 0 | Status: SHIPPED | OUTPATIENT
Start: 2022-12-26 | End: 2022-12-26

## 2022-12-26 NOTE — PATIENT INSTRUCTIONS
Rapid strep negative. Viral panel pending (RSV, COVID-19, Influenza A/B). Anticipate results available in 24-72 hours. Lidia Thomason as ordered. Magic mouthwash for throat discomfort. Swish, gargle and spit out medication (do not swallow). Check the temperature of foods and liquids prior to ingestion to avoid burns. Use caution when chewing food to avoid biting tongue or inside of cheek.    Encourage fluids, humidifier/vaporizor at bedside, elevate head of bed (sleep with extra pillow), vapor rub to chest, steam therapy if no fever, warm compresses for sinus pressure if no fever, salt water gargles for sore throat, lozenges for sore throat, may try over the counter saline nasal spray or irrigation kit (use distilled water with irrigation kit) for sinus pressure/congestion, get plenty of rest.

## 2022-12-27 ENCOUNTER — APPOINTMENT (OUTPATIENT)
Dept: INFECTIOUS DISEASES | Age: 37
End: 2022-12-27

## 2022-12-27 LAB
FLUAV + FLUBV RNA SPEC NAA+PROBE: DETECTED
FLUAV + FLUBV RNA SPEC NAA+PROBE: NOT DETECTED
RSV RNA SPEC NAA+PROBE: NOT DETECTED
SARS-COV-2 RNA RESP QL NAA+PROBE: NOT DETECTED

## 2023-01-02 ENCOUNTER — E-ADVICE (OUTPATIENT)
Dept: PODIATRY | Age: 38
End: 2023-01-02

## 2023-01-13 ENCOUNTER — TELEPHONE (OUTPATIENT)
Dept: FAMILY MEDICINE CLINIC | Facility: CLINIC | Age: 38
End: 2023-01-13

## 2023-01-13 NOTE — TELEPHONE ENCOUNTER
Request for most recent labs, ECG received from [facility]. Signed request for records attached. Reports faxed to number provided.

## 2023-01-18 ENCOUNTER — APPOINTMENT (OUTPATIENT)
Dept: PODIATRY | Age: 38
End: 2023-01-18

## 2023-01-19 ENCOUNTER — E-ADVICE (OUTPATIENT)
Dept: PODIATRY | Age: 38
End: 2023-01-19

## 2023-01-19 ENCOUNTER — TELEPHONE (OUTPATIENT)
Dept: PODIATRY | Age: 38
End: 2023-01-19

## 2023-01-25 DIAGNOSIS — Z98.890 S/P FOOT SURGERY, RIGHT: Primary | ICD-10-CM

## 2023-01-25 DIAGNOSIS — M65.9 SYNOVITIS OF RIGHT ANKLE: ICD-10-CM

## 2023-03-10 ENCOUNTER — TELEPHONE (OUTPATIENT)
Dept: ORTHOPEDICS | Age: 38
End: 2023-03-10

## 2023-03-20 ENCOUNTER — OFFICE VISIT (OUTPATIENT)
Dept: FAMILY MEDICINE CLINIC | Facility: CLINIC | Age: 38
End: 2023-03-20
Payer: COMMERCIAL

## 2023-03-20 VITALS
WEIGHT: 240 LBS | HEIGHT: 69 IN | RESPIRATION RATE: 18 BRPM | SYSTOLIC BLOOD PRESSURE: 134 MMHG | BODY MASS INDEX: 35.55 KG/M2 | TEMPERATURE: 98 F | HEART RATE: 118 BPM | DIASTOLIC BLOOD PRESSURE: 84 MMHG | OXYGEN SATURATION: 98 %

## 2023-03-20 DIAGNOSIS — J02.9 SORE THROAT: Primary | ICD-10-CM

## 2023-03-20 LAB — CONTROL LINE PRESENT WITH A CLEAR BACKGROUND (YES/NO): YES YES/NO

## 2023-03-20 PROCEDURE — 87637 SARSCOV2&INF A&B&RSV AMP PRB: CPT | Performed by: NURSE PRACTITIONER

## 2023-03-20 PROCEDURE — 87081 CULTURE SCREEN ONLY: CPT | Performed by: NURSE PRACTITIONER

## 2023-03-21 LAB
FLUAV + FLUBV RNA SPEC NAA+PROBE: NOT DETECTED
FLUAV + FLUBV RNA SPEC NAA+PROBE: NOT DETECTED
RSV RNA SPEC NAA+PROBE: NOT DETECTED
SARS-COV-2 RNA RESP QL NAA+PROBE: NOT DETECTED

## 2023-05-02 ENCOUNTER — IMAGING SERVICES (OUTPATIENT)
Dept: CT IMAGING | Age: 38
End: 2023-05-02
Attending: PODIATRIST

## 2023-05-02 DIAGNOSIS — Z98.890 S/P FOOT SURGERY, RIGHT: ICD-10-CM

## 2023-05-02 DIAGNOSIS — M19.071 DJD (DEGENERATIVE JOINT DISEASE), ANKLE AND FOOT, RIGHT: ICD-10-CM

## 2023-05-02 DIAGNOSIS — M76.821 POSTERIOR TIBIAL TENDONITIS, RIGHT: ICD-10-CM

## 2023-05-02 DIAGNOSIS — T85.848D PAIN DUE TO ANY DEVICE, IMPLANT OR GRAFT, SUBSEQUENT ENCOUNTER: ICD-10-CM

## 2023-05-02 DIAGNOSIS — M65.9 SYNOVITIS OF RIGHT ANKLE: ICD-10-CM

## 2023-05-02 DIAGNOSIS — M96.0 NONUNION OF SUBTALAR ARTHRODESIS: ICD-10-CM

## 2023-05-02 PROCEDURE — 73700 CT LOWER EXTREMITY W/O DYE: CPT | Performed by: RADIOLOGY

## 2023-05-17 ENCOUNTER — TELEPHONE (OUTPATIENT)
Dept: PODIATRY | Age: 38
End: 2023-05-17

## 2023-05-17 ENCOUNTER — WORKER'S COMP (OUTPATIENT)
Dept: PODIATRY | Age: 38
End: 2023-05-17

## 2023-05-17 DIAGNOSIS — Z98.890 S/P FOOT SURGERY, RIGHT: ICD-10-CM

## 2023-05-17 DIAGNOSIS — M19.071 DJD (DEGENERATIVE JOINT DISEASE), ANKLE AND FOOT, RIGHT: ICD-10-CM

## 2023-05-17 DIAGNOSIS — M96.89 DELAYED UNION AFTER OSTEOTOMY: Primary | ICD-10-CM

## 2023-05-17 DIAGNOSIS — M65.9 SYNOVITIS OF RIGHT ANKLE: ICD-10-CM

## 2023-05-17 PROCEDURE — 99214 OFFICE O/P EST MOD 30 MIN: CPT | Performed by: PODIATRIST

## 2023-05-17 RX ORDER — ARIPIPRAZOLE 5 MG/1
5 TABLET ORAL NIGHTLY
COMMUNITY
Start: 2023-04-26

## 2023-05-17 RX ORDER — LAMOTRIGINE 25 MG/1
25 TABLET ORAL DAILY
COMMUNITY
Start: 2023-05-07

## 2023-06-21 ENCOUNTER — APPOINTMENT (OUTPATIENT)
Dept: PODIATRY | Age: 38
End: 2023-06-21

## 2023-06-22 ENCOUNTER — TELEPHONE (OUTPATIENT)
Dept: PODIATRY | Age: 38
End: 2023-06-22

## 2023-06-22 DIAGNOSIS — T85.848D PAIN DUE TO ANY DEVICE, IMPLANT OR GRAFT, SUBSEQUENT ENCOUNTER: Primary | ICD-10-CM

## 2023-06-25 ENCOUNTER — PREP FOR CASE (OUTPATIENT)
Dept: ORTHOPEDICS | Age: 38
End: 2023-06-25

## 2023-06-25 ENCOUNTER — TELEPHONE (OUTPATIENT)
Dept: INTERNAL MEDICINE | Age: 38
End: 2023-06-25

## 2023-06-25 DIAGNOSIS — T85.848D PAIN DUE TO ANY DEVICE, IMPLANT OR GRAFT, SUBSEQUENT ENCOUNTER: Primary | ICD-10-CM

## 2023-06-26 ENCOUNTER — TELEPHONE (OUTPATIENT)
Dept: HEMATOLOGY/ONCOLOGY | Facility: HOSPITAL | Age: 38
End: 2023-06-26

## 2023-06-26 NOTE — TELEPHONE ENCOUNTER
Received fax from [address] [facility] requesting MD follow-up with [facility] for patient. Patient will have removal of symptomatic hardware to his right ankle this upcoming August. PSR notified to assist with scheduling needs. Request scanned into medical chart.

## 2023-06-27 ENCOUNTER — HOSPITAL ENCOUNTER (OUTPATIENT)
Age: 38
End: 2023-06-27

## 2023-06-27 ENCOUNTER — TELEPHONE (OUTPATIENT)
Dept: HEMATOLOGY/ONCOLOGY | Facility: HOSPITAL | Age: 38
End: 2023-06-27

## 2023-07-05 ENCOUNTER — APPOINTMENT (OUTPATIENT)
Dept: HEMATOLOGY/ONCOLOGY | Facility: HOSPITAL | Age: 38
End: 2023-07-05
Attending: INTERNAL MEDICINE
Payer: COMMERCIAL

## 2023-07-07 ENCOUNTER — APPOINTMENT (OUTPATIENT)
Dept: FAMILY MEDICINE | Age: 38
End: 2023-07-07

## 2023-07-10 ENCOUNTER — LAB SERVICES (OUTPATIENT)
Dept: LAB | Age: 38
End: 2023-07-10

## 2023-07-10 ENCOUNTER — OFFICE VISIT (OUTPATIENT)
Dept: FAMILY MEDICINE | Age: 38
End: 2023-07-10

## 2023-07-10 VITALS
HEART RATE: 84 BPM | TEMPERATURE: 96.9 F | SYSTOLIC BLOOD PRESSURE: 110 MMHG | DIASTOLIC BLOOD PRESSURE: 70 MMHG | BODY MASS INDEX: 33.47 KG/M2 | HEIGHT: 69 IN | WEIGHT: 226 LBS | RESPIRATION RATE: 16 BRPM

## 2023-07-10 DIAGNOSIS — I71.22 ANEURYSM OF AORTIC ARCH WITHOUT RUPTURE (CMD): ICD-10-CM

## 2023-07-10 DIAGNOSIS — T85.848S PAIN DUE TO ANY DEVICE, IMPLANT OR GRAFT, SEQUELA: ICD-10-CM

## 2023-07-10 DIAGNOSIS — G89.29 CHRONIC PAIN OF RIGHT ANKLE: Primary | ICD-10-CM

## 2023-07-10 DIAGNOSIS — T85.848D PAIN DUE TO ANY DEVICE, IMPLANT OR GRAFT, SUBSEQUENT ENCOUNTER: ICD-10-CM

## 2023-07-10 DIAGNOSIS — I82.531 CHRONIC DEEP VEIN THROMBOSIS (DVT) OF POPLITEAL VEIN OF RIGHT LOWER EXTREMITY (CMD): ICD-10-CM

## 2023-07-10 DIAGNOSIS — M25.571 CHRONIC PAIN OF RIGHT ANKLE: Primary | ICD-10-CM

## 2023-07-10 DIAGNOSIS — Z01.818 ENCOUNTER FOR OTHER PREPROCEDURAL EXAMINATION: ICD-10-CM

## 2023-07-10 LAB
ANION GAP SERPL CALC-SCNC: 16 MMOL/L (ref 7–19)
BASOPHILS # BLD: 0 K/MCL (ref 0–0.3)
BASOPHILS NFR BLD: 0 %
BUN SERPL-MCNC: 13 MG/DL (ref 6–20)
BUN/CREAT SERPL: 12 (ref 7–25)
CALCIUM SERPL-MCNC: 8.6 MG/DL (ref 8.4–10.2)
CHLORIDE SERPL-SCNC: 101 MMOL/L (ref 97–110)
CO2 SERPL-SCNC: 26 MMOL/L (ref 21–32)
CREAT SERPL-MCNC: 1.13 MG/DL (ref 0.67–1.17)
DEPRECATED RDW RBC: 39.6 FL (ref 39–50)
EOSINOPHIL # BLD: 0.1 K/MCL (ref 0–0.5)
EOSINOPHIL NFR BLD: 1 %
ERYTHROCYTE [DISTWIDTH] IN BLOOD: 12.6 % (ref 11–15)
FASTING DURATION TIME PATIENT: NORMAL H
GFR SERPLBLD BASED ON 1.73 SQ M-ARVRAT: 85 ML/MIN
GLUCOSE SERPL-MCNC: 92 MG/DL (ref 70–99)
HCT VFR BLD CALC: 47.9 % (ref 39–51)
HGB BLD-MCNC: 16.4 G/DL (ref 13–17)
IMM GRANULOCYTES # BLD AUTO: 0 K/MCL (ref 0–0.2)
IMM GRANULOCYTES # BLD: 0 %
LYMPHOCYTES # BLD: 1.6 K/MCL (ref 1–4.8)
LYMPHOCYTES NFR BLD: 26 %
MCH RBC QN AUTO: 29.5 PG (ref 26–34)
MCHC RBC AUTO-ENTMCNC: 34.2 G/DL (ref 32–36.5)
MCV RBC AUTO: 86.3 FL (ref 78–100)
MONOCYTES # BLD: 0.5 K/MCL (ref 0.3–0.9)
MONOCYTES NFR BLD: 8 %
NEUTROPHILS # BLD: 3.9 K/MCL (ref 1.8–7.7)
NEUTROPHILS NFR BLD: 65 %
NRBC BLD MANUAL-RTO: 0 /100 WBC
PLATELET # BLD AUTO: 210 K/MCL (ref 140–450)
POTASSIUM SERPL-SCNC: 4.6 MMOL/L (ref 3.4–5.1)
RBC # BLD: 5.55 MIL/MCL (ref 4.5–5.9)
SODIUM SERPL-SCNC: 138 MMOL/L (ref 135–145)
WBC # BLD: 6 K/MCL (ref 4.2–11)

## 2023-07-10 PROCEDURE — 36415 COLL VENOUS BLD VENIPUNCTURE: CPT | Performed by: FAMILY MEDICINE

## 2023-07-10 PROCEDURE — 99244 OFF/OP CNSLTJ NEW/EST MOD 40: CPT | Performed by: FAMILY MEDICINE

## 2023-07-10 PROCEDURE — 80048 BASIC METABOLIC PNL TOTAL CA: CPT | Performed by: INTERNAL MEDICINE

## 2023-07-10 PROCEDURE — 85025 COMPLETE CBC W/AUTO DIFF WBC: CPT | Performed by: INTERNAL MEDICINE

## 2023-07-10 ASSESSMENT — PATIENT HEALTH QUESTIONNAIRE - PHQ9
1. LITTLE INTEREST OR PLEASURE IN DOING THINGS: NOT AT ALL
2. FEELING DOWN, DEPRESSED OR HOPELESS: NOT AT ALL
SUM OF ALL RESPONSES TO PHQ9 QUESTIONS 1 AND 2: 0
CLINICAL INTERPRETATION OF PHQ2 SCORE: NO FURTHER SCREENING NEEDED
SUM OF ALL RESPONSES TO PHQ9 QUESTIONS 1 AND 2: 0

## 2023-07-11 ENCOUNTER — OFFICE VISIT (OUTPATIENT)
Dept: HEMATOLOGY/ONCOLOGY | Facility: HOSPITAL | Age: 38
End: 2023-07-11
Attending: INTERNAL MEDICINE
Payer: COMMERCIAL

## 2023-07-11 VITALS
OXYGEN SATURATION: 98 % | BODY MASS INDEX: 34 KG/M2 | WEIGHT: 230 LBS | HEART RATE: 70 BPM | TEMPERATURE: 97 F | RESPIRATION RATE: 16 BRPM | DIASTOLIC BLOOD PRESSURE: 77 MMHG | SYSTOLIC BLOOD PRESSURE: 122 MMHG

## 2023-07-11 DIAGNOSIS — Z86.718 HISTORY OF DVT (DEEP VEIN THROMBOSIS): ICD-10-CM

## 2023-07-11 DIAGNOSIS — I82.431 ACUTE DEEP VEIN THROMBOSIS (DVT) OF RIGHT POPLITEAL VEIN (HCC): Primary | ICD-10-CM

## 2023-07-11 LAB
ALBUMIN SERPL-MCNC: 3.9 G/DL (ref 3.4–5)
ALBUMIN/GLOB SERPL: 1.1 {RATIO} (ref 1–2)
ALP LIVER SERPL-CCNC: 67 U/L
ALT SERPL-CCNC: 37 U/L
ANION GAP SERPL CALC-SCNC: 5 MMOL/L (ref 0–18)
AST SERPL-CCNC: 24 U/L (ref 15–37)
BASOPHILS # BLD AUTO: 0.01 X10(3) UL (ref 0–0.2)
BASOPHILS NFR BLD AUTO: 0.2 %
BILIRUB SERPL-MCNC: 1.1 MG/DL (ref 0.1–2)
BUN BLD-MCNC: 13 MG/DL (ref 7–18)
CALCIUM BLD-MCNC: 8.7 MG/DL (ref 8.5–10.1)
CHLORIDE SERPL-SCNC: 108 MMOL/L (ref 98–112)
CO2 SERPL-SCNC: 24 MMOL/L (ref 21–32)
CREAT BLD-MCNC: 1.09 MG/DL
EOSINOPHIL # BLD AUTO: 0.05 X10(3) UL (ref 0–0.7)
EOSINOPHIL NFR BLD AUTO: 0.8 %
ERYTHROCYTE [DISTWIDTH] IN BLOOD BY AUTOMATED COUNT: 12.6 %
FASTING STATUS PATIENT QL REPORTED: NO
GFR SERPLBLD BASED ON 1.73 SQ M-ARVRAT: 89 ML/MIN/1.73M2 (ref 60–?)
GLOBULIN PLAS-MCNC: 3.7 G/DL (ref 2.8–4.4)
GLUCOSE BLD-MCNC: 110 MG/DL (ref 70–99)
HCT VFR BLD AUTO: 45.8 %
HGB BLD-MCNC: 15.5 G/DL
IMM GRANULOCYTES # BLD AUTO: 0.02 X10(3) UL (ref 0–1)
IMM GRANULOCYTES NFR BLD: 0.3 %
LYMPHOCYTES # BLD AUTO: 1.71 X10(3) UL (ref 1–4)
LYMPHOCYTES NFR BLD AUTO: 26.8 %
MCH RBC QN AUTO: 29.4 PG (ref 26–34)
MCHC RBC AUTO-ENTMCNC: 33.8 G/DL (ref 31–37)
MCV RBC AUTO: 86.9 FL
MONOCYTES # BLD AUTO: 0.48 X10(3) UL (ref 0.1–1)
MONOCYTES NFR BLD AUTO: 7.5 %
NEUTROPHILS # BLD AUTO: 4.1 X10 (3) UL (ref 1.5–7.7)
NEUTROPHILS # BLD AUTO: 4.1 X10(3) UL (ref 1.5–7.7)
NEUTROPHILS NFR BLD AUTO: 64.4 %
OSMOLALITY SERPL CALC.SUM OF ELEC: 285 MOSM/KG (ref 275–295)
PLATELET # BLD AUTO: 191 10(3)UL (ref 150–450)
POTASSIUM SERPL-SCNC: 3.9 MMOL/L (ref 3.5–5.1)
PROT SERPL-MCNC: 7.6 G/DL (ref 6.4–8.2)
RBC # BLD AUTO: 5.27 X10(6)UL
SODIUM SERPL-SCNC: 137 MMOL/L (ref 136–145)
WBC # BLD AUTO: 6.4 X10(3) UL (ref 4–11)

## 2023-07-11 PROCEDURE — 99215 OFFICE O/P EST HI 40 MIN: CPT | Performed by: INTERNAL MEDICINE

## 2023-07-11 NOTE — PROGRESS NOTES
Education Record    Learner:  Patient    Disease / Diagnosis:DVT     Barriers / Limitations:  None   Comments:    Method:  Discussion   Comments:    General Topics:  Plan of care reviewed   Comments:    Outcome:  Shows understanding   Comments:    Patient here for follow-up. Remains on Eliquis 5 mg BID. Upcoming right ankle surgery scheduled for 8/1/23. Denies any redness,warmth, swelling, or pain to right lower extremity. Denies any chest pain or dyspnea.

## 2023-07-12 ENCOUNTER — TELEPHONE (OUTPATIENT)
Dept: HEMATOLOGY/ONCOLOGY | Facility: HOSPITAL | Age: 38
End: 2023-07-12

## 2023-07-12 ENCOUNTER — HOSPITAL ENCOUNTER (OUTPATIENT)
Dept: ULTRASOUND IMAGING | Age: 38
Discharge: HOME | End: 2023-07-12
Attending: INTERNAL MEDICINE
Payer: COMMERCIAL

## 2023-07-12 DIAGNOSIS — Z86.718 HISTORY OF DVT (DEEP VEIN THROMBOSIS): Primary | ICD-10-CM

## 2023-07-12 DIAGNOSIS — Z86.718 HISTORY OF DVT (DEEP VEIN THROMBOSIS): ICD-10-CM

## 2023-07-12 DIAGNOSIS — I82.431 ACUTE DEEP VEIN THROMBOSIS (DVT) OF RIGHT POPLITEAL VEIN (HCC): ICD-10-CM

## 2023-07-12 PROCEDURE — 93971 EXTREMITY STUDY: CPT | Performed by: INTERNAL MEDICINE

## 2023-07-12 NOTE — TELEPHONE ENCOUNTER
Patient called MD was to call in script for blood thinner yesterday but none ordered. He is requesting a call back from office with instructions.

## 2023-07-12 NOTE — TELEPHONE ENCOUNTER
Reviewed most recent US. There is a persistent RLE DVT in 2 small paired popliteal veins distally. His D-dimer is normal which suggests a chronic clot. He will continue Eliquis 5 mg BID and stop 2 days before his surgery. He will resume afterwards. We will continue his anticoagulation until around  9/12/23. At that time we will repeat an RLE US (ordered). Of note, he may need prescription assistance for his anticoagulation.

## 2023-07-12 NOTE — TELEPHONE ENCOUNTER
Verner Poe would like for his medication:  Take 1 tablet (5 mg total) by mouth 2 (two) times daily Disp-60 tablet. To be sent to the following pharmacy  [id number] - [address], [phone], [facility] [address] Ver[address] Poe [facility] other [facility]  they didn't have this medication in stock and that it cost $800.00. His other question is that he can't afford the medication at that price, what is he to do ?    Thanks Okeyko

## 2023-07-12 NOTE — TELEPHONE ENCOUNTER
MD spoke with patient regarding ultrasound results.  Samples of Eliquis provided to patient due to cost per MD request.

## 2023-07-14 ENCOUNTER — TELEPHONE (OUTPATIENT)
Dept: PODIATRY | Age: 38
End: 2023-07-14

## 2023-07-14 PROBLEM — G89.29 CHRONIC PAIN OF RIGHT ANKLE: Status: ACTIVE | Noted: 2019-10-29

## 2023-07-17 ENCOUNTER — E-ADVICE (OUTPATIENT)
Dept: PODIATRY | Age: 38
End: 2023-07-17

## 2023-07-26 ENCOUNTER — E-ADVICE (OUTPATIENT)
Dept: ORTHOPEDICS | Age: 38
End: 2023-07-26

## 2023-07-28 ENCOUNTER — TELEPHONE (OUTPATIENT)
Dept: PODIATRY | Age: 38
End: 2023-07-28

## 2023-07-31 ENCOUNTER — APPOINTMENT (OUTPATIENT)
Dept: INTERNAL MEDICINE | Age: 38
End: 2023-07-31

## 2023-08-01 ENCOUNTER — EXTERNAL RECORD (OUTPATIENT)
Dept: PODIATRY | Age: 38
End: 2023-08-01

## 2023-08-01 PROCEDURE — 20999 UNLISTED PX MUSCSKEL GENERAL: CPT | Performed by: PODIATRIST

## 2023-08-01 PROCEDURE — 28320 REPAIR OF FOOT BONES: CPT | Performed by: PODIATRIST

## 2023-08-02 ENCOUNTER — TELEPHONE (OUTPATIENT)
Dept: PODIATRY | Age: 38
End: 2023-08-02

## 2023-08-03 ENCOUNTER — TELEPHONE (OUTPATIENT)
Dept: PODIATRY | Age: 38
End: 2023-08-03

## 2023-08-09 ENCOUNTER — WORKER'S COMP (OUTPATIENT)
Dept: PODIATRY | Age: 38
End: 2023-08-09

## 2023-08-09 DIAGNOSIS — Z98.890 STATUS POST HARDWARE REMOVAL: ICD-10-CM

## 2023-08-09 PROCEDURE — L4386 NON-PNEUM WALK BOOT PRE CST: HCPCS | Performed by: NURSE PRACTITIONER

## 2023-08-09 PROCEDURE — 99024 POSTOP FOLLOW-UP VISIT: CPT | Performed by: NURSE PRACTITIONER

## 2023-08-23 ENCOUNTER — WORKER'S COMP (OUTPATIENT)
Dept: PODIATRY | Age: 38
End: 2023-08-23

## 2023-08-23 DIAGNOSIS — T85.848D PAIN DUE TO ANY DEVICE, IMPLANT OR GRAFT, SUBSEQUENT ENCOUNTER: ICD-10-CM

## 2023-08-23 DIAGNOSIS — Z98.890 S/P FOOT SURGERY, RIGHT: Primary | ICD-10-CM

## 2023-08-23 PROCEDURE — 99024 POSTOP FOLLOW-UP VISIT: CPT | Performed by: PODIATRIST

## 2023-08-23 PROCEDURE — L1902 AFO ANKLE GAUNTLET PRE OTS: HCPCS | Performed by: PODIATRIST

## 2023-08-23 RX ORDER — ASPIRIN 81 MG/1
81 TABLET ORAL DAILY
COMMUNITY

## 2023-08-23 RX ORDER — APIXABAN 5 MG/1
5 TABLET, FILM COATED ORAL 2 TIMES DAILY
COMMUNITY
Start: 2023-08-17

## 2023-09-04 ENCOUNTER — OFFICE VISIT (OUTPATIENT)
Dept: FAMILY MEDICINE CLINIC | Facility: CLINIC | Age: 38
End: 2023-09-04
Payer: COMMERCIAL

## 2023-09-04 VITALS
RESPIRATION RATE: 18 BRPM | DIASTOLIC BLOOD PRESSURE: 76 MMHG | SYSTOLIC BLOOD PRESSURE: 128 MMHG | OXYGEN SATURATION: 98 % | BODY MASS INDEX: 34.8 KG/M2 | WEIGHT: 235 LBS | HEART RATE: 81 BPM | HEIGHT: 69 IN | TEMPERATURE: 98 F

## 2023-09-04 DIAGNOSIS — B34.9 VIRAL SYNDROME: Primary | ICD-10-CM

## 2023-09-04 LAB
OPERATOR ID: NORMAL
RAPID SARS-COV-2 BY PCR: NOT DETECTED

## 2023-09-04 PROCEDURE — 87637 SARSCOV2&INF A&B&RSV AMP PRB: CPT | Performed by: NURSE PRACTITIONER

## 2023-09-13 ENCOUNTER — TELEPHONE (OUTPATIENT)
Dept: HEMATOLOGY/ONCOLOGY | Facility: HOSPITAL | Age: 38
End: 2023-09-13

## 2023-09-13 ENCOUNTER — HOSPITAL ENCOUNTER (OUTPATIENT)
Dept: ULTRASOUND IMAGING | Age: 38
Discharge: HOME | End: 2023-09-13
Attending: INTERNAL MEDICINE
Payer: COMMERCIAL

## 2023-09-13 DIAGNOSIS — Z86.718 HISTORY OF DVT (DEEP VEIN THROMBOSIS): ICD-10-CM

## 2023-09-13 PROCEDURE — 93971 EXTREMITY STUDY: CPT | Performed by: INTERNAL MEDICINE

## 2023-09-14 ENCOUNTER — NURSE ONLY (OUTPATIENT)
Dept: HEMATOLOGY/ONCOLOGY | Facility: HOSPITAL | Age: 38
End: 2023-09-14
Attending: INTERNAL MEDICINE
Payer: COMMERCIAL

## 2023-09-14 DIAGNOSIS — Z86.718 HISTORY OF DVT (DEEP VEIN THROMBOSIS): ICD-10-CM

## 2023-09-14 DIAGNOSIS — I82.431 ACUTE DEEP VEIN THROMBOSIS (DVT) OF RIGHT POPLITEAL VEIN (HCC): Primary | ICD-10-CM

## 2023-09-14 LAB — D DIMER PPP FEU-MCNC: 0.31 UG/ML FEU (ref ?–0.5)

## 2023-09-14 PROCEDURE — 85379 FIBRIN DEGRADATION QUANT: CPT

## 2023-09-14 PROCEDURE — 36415 COLL VENOUS BLD VENIPUNCTURE: CPT

## 2023-09-20 ENCOUNTER — IMAGING SERVICES (OUTPATIENT)
Dept: GENERAL RADIOLOGY | Age: 38
End: 2023-09-20
Attending: PODIATRIST

## 2023-09-20 ENCOUNTER — WORKER'S COMP (OUTPATIENT)
Dept: PODIATRY | Age: 38
End: 2023-09-20

## 2023-09-20 DIAGNOSIS — Z98.890 S/P FOOT SURGERY, RIGHT: ICD-10-CM

## 2023-09-20 DIAGNOSIS — T85.848D PAIN DUE TO ANY DEVICE, IMPLANT OR GRAFT, SUBSEQUENT ENCOUNTER: ICD-10-CM

## 2023-09-20 DIAGNOSIS — Z98.890 S/P FOOT SURGERY, RIGHT: Primary | ICD-10-CM

## 2023-09-20 PROCEDURE — 73630 X-RAY EXAM OF FOOT: CPT | Performed by: RADIOLOGY

## 2023-09-20 PROCEDURE — 99024 POSTOP FOLLOW-UP VISIT: CPT | Performed by: PODIATRIST

## 2023-10-26 ENCOUNTER — TELEPHONE (OUTPATIENT)
Dept: PODIATRY | Age: 38
End: 2023-10-26

## 2023-10-26 ENCOUNTER — E-ADVICE (OUTPATIENT)
Dept: PODIATRY | Age: 38
End: 2023-10-26

## 2023-11-27 DIAGNOSIS — I82.431 ACUTE DEEP VEIN THROMBOSIS (DVT) OF RIGHT POPLITEAL VEIN (HCC): Primary | ICD-10-CM

## 2023-11-29 ENCOUNTER — APPOINTMENT (OUTPATIENT)
Dept: PODIATRY | Age: 38
End: 2023-11-29

## 2023-11-30 ENCOUNTER — TELEPHONE (OUTPATIENT)
Dept: PODIATRY | Age: 38
End: 2023-11-30

## 2023-12-07 ENCOUNTER — WORKER'S COMP (OUTPATIENT)
Dept: PODIATRY | Age: 38
End: 2023-12-07

## 2023-12-07 DIAGNOSIS — T85.848D PAIN DUE TO ANY DEVICE, IMPLANT OR GRAFT, SUBSEQUENT ENCOUNTER: ICD-10-CM

## 2023-12-07 DIAGNOSIS — M96.89 DELAYED UNION AFTER OSTEOTOMY: ICD-10-CM

## 2023-12-07 DIAGNOSIS — Z98.890 S/P FOOT SURGERY, RIGHT: ICD-10-CM

## 2023-12-07 DIAGNOSIS — Z98.890 STATUS POST HARDWARE REMOVAL: Primary | ICD-10-CM

## 2023-12-07 PROCEDURE — 99213 OFFICE O/P EST LOW 20 MIN: CPT | Performed by: PODIATRIST

## 2023-12-27 ENCOUNTER — IMAGING SERVICES (OUTPATIENT)
Dept: OTHER | Age: 38
End: 2023-12-27

## 2023-12-27 ENCOUNTER — HOSPITAL ENCOUNTER (OUTPATIENT)
Dept: ULTRASOUND IMAGING | Age: 38
Discharge: HOME | End: 2023-12-27
Attending: NURSE PRACTITIONER
Payer: COMMERCIAL

## 2023-12-27 ENCOUNTER — TELEPHONE (OUTPATIENT)
Dept: HEMATOLOGY/ONCOLOGY | Facility: HOSPITAL | Age: 38
End: 2023-12-27

## 2023-12-27 DIAGNOSIS — I82.431 ACUTE DEEP VEIN THROMBOSIS (DVT) OF RIGHT POPLITEAL VEIN (HCC): ICD-10-CM

## 2023-12-27 PROCEDURE — 93971 EXTREMITY STUDY: CPT | Performed by: NURSE PRACTITIONER

## 2023-12-28 ENCOUNTER — TELEPHONE (OUTPATIENT)
Dept: HEMATOLOGY/ONCOLOGY | Facility: HOSPITAL | Age: 38
End: 2023-12-28

## 2023-12-28 DIAGNOSIS — I82.431 ACUTE DEEP VEIN THROMBOSIS (DVT) OF RIGHT POPLITEAL VEIN (HCC): Primary | ICD-10-CM

## 2023-12-28 NOTE — TELEPHONE ENCOUNTER
Spoke with patient. He will come tomorrow 12/29/23 for his D Dimer. Denies any new symptoms such as pain, swelling, redness, or warmth to the extremity. He said his last surgery was early 2023. Valentino Grice, MD  [facility]; MATTHEW Ramires  Results reviewed. Hard to tell if there is mild clot propagation. 1. Can you order a D-dimer please  2. Any new symptoms? 3. Did he go through his surgery?

## 2023-12-29 ENCOUNTER — NURSE ONLY (OUTPATIENT)
Dept: HEMATOLOGY/ONCOLOGY | Facility: HOSPITAL | Age: 38
End: 2023-12-29
Attending: INTERNAL MEDICINE
Payer: COMMERCIAL

## 2023-12-29 DIAGNOSIS — I82.431 ACUTE DEEP VEIN THROMBOSIS (DVT) OF RIGHT POPLITEAL VEIN (HCC): ICD-10-CM

## 2023-12-29 LAB — D DIMER PPP FEU-MCNC: 0.28 UG/ML FEU (ref ?–0.5)

## 2023-12-29 PROCEDURE — 85379 FIBRIN DEGRADATION QUANT: CPT

## 2023-12-29 PROCEDURE — 36415 COLL VENOUS BLD VENIPUNCTURE: CPT

## 2024-01-08 NOTE — PROGRESS NOTES
Telephone Visit    [facility] Note    Visit Diagnosis:  No diagnosis found.    History of Present Illness: 38M with a PMH of HTN, ADHD, VIMAL and lumbar radiculopathy referred by Dr. Nava to discuss DVT management. He has a history of a RLE Popliteal DVT (dx 01/2021).    Hematology History  -11/2019: R ankle surgery. Was immobilized for prolonged period.     -7/28/2020: Right subtalar joint fusion, right placement of autograft bone at [facility].  Postoperative treatment included immobilization in a posterior splint followed by a short leg cast and and nonweightbearing. He states that he was non-weight bearing and was immobilized until around 11/2020. He states that he developed RLE pain/swelling around 1 month after his surgery.     -08/27/20: RLE Doppler: Negative for DVT. He states that after this US, his RLE pain and swelling remained the same.     -1/21/21: RLE Doppler: DVT in R popliteal vein    -1/22/21: CTA Chest at [facility]: Negative for PE. Noted to have 4.2 thoracic aneurysm.    -He was started on Xarelto on 01/21/21 for about 1 month but switched to Eliquis due to cost. He states that he still has some RLE pain/swelling.     -04/21/21: RLE Doppler: chronic R popliteal DVT    -04/27/21: D-dimer negative     -06/27/21: Presented to [facility] for RLE Swelling. RLE US at [facility] was negative for DVT. No evidence of PE.     -07/28/21: RLE Doppler: Stable appearance of popliteal vein thrombosis     -07/2021: He stopped Eliquis-->dimer on 07/30/21 negative    -09/02/2021: D-dimer negative-->2 month follow up dimer requested but not done    -He presented to [facility] 6/12[facility]23 for R Knee pain. CT unremarkable. RLE Doppler showed a DVT in the R Popliteal vein. No comparison made. No D-dimer. HE was started on Eliquis. He does not smoke. There is no FH or personal history of VTE. No bleeding associated with anticoagulation.     -7/12/23: Reviewed most recent US. There is a  persistent RLE DVT in 2 small paired popliteal veins distally. His D-dimer is normal which suggests a chronic clot. He will continue Eliquis 5 mg BID and stop 2 days before his surgery. He will resume afterwards. We will continue his anticoagulation until around  9/12/23. At that time we will repeat an RLE US (ordered)    -8/1/23: Right foot-removal of symptomatic hardware, ORIF subtalar joint with bone graft    -9/12/23: Right lower distal popliteal vein DVT-unclear if acute vs. Chronic. D-dimer was normal following day at 0.31    -12/27/23: RLE Doppler: Partially occlusive DVT in the proximal popliteal vein and occlusive DVT in the distal popliteal vein. Possible propagation noted. Dimer on 12/30/23 is normal at 0.28.     Interval History: 1/10/24  -He feels well. No RLE pain or swelling. Compliant with Eliquis. Repeat imaging and labs reviewed     Review of Systems: 12 Point ROS was completed and pertinent positives are in the HPI    Current Outpatient Medications on File Prior to Visit   Medication Sig Dispense Refill    apixaban 5 MG Oral Tab Take 1 tablet (5 mg total) by mouth 2 (two) times daily. 60 tablet 3    HYDROcodone-acetaminophen  MG Oral Tab TAKE 1 TABLET BY MOUTH NO MORE THAN EVERY 6 HOURS FOR BREAKTHROUGH PAIN      ALPRAZolam 1 MG Oral Tab Take 1 tablet (1 mg total) by mouth 3 (three) times daily as needed.      amphetamine-dextroamphetamine 10 MG Oral Tab Take 1 tablet (10 mg total) by mouth 3 (three) times daily.       No current facility-administered medications on file prior to visit.     Past Medical History:   Diagnosis Date    Anxiety 1/23/2015    Attention deficit disorder with hyperactivity(314.01)     Depressive disorder, not elsewhere classified     Essential hypertension     Lumbosacral radiculopathy 9/24/2015    R    Obstructive sleep apnea (adult) (pediatric) 6/29/2012    Doesn't want mouth or cpap     Panic disorder 6/11/2015    Rhabdomyolysis 6/16/2015    Labs 6.2015    Right  low back pain 6/11/2015    Sciatica 6/11/2015     Past Surgical History:   Procedure Laterality Date    HAND/FINGER SURGERY UNLISTED      pin placed for fracture    KNEE SURGERY  06/21/2022    KNEE REVISION SUBTALAR FUSION    OTHER Right 2020    fusion of right subtalar joint    OTHER SURGICAL HISTORY      Tooth extraction    OTHER SURGICAL HISTORY Bilateral 8/24/2015    Procedure: TRANSFORAMINAL LUMBAR EPIDURAL STEROID INJECTION MULTIPLE LEVEL AND BILATERAL;  Surgeon: Dominguez Prasad MD;  Location:  MAIN OR    OTHER SURGICAL HISTORY Bilateral 9/1/2015    Procedure: TRANSFORAMINAL LUMBAR EPIDURAL STEROID INJECTION MULTIPLE LEVEL AND BILATERAL;  Surgeon: Dominguez Prasad MD;  Location: [facility]    OTHER SURGICAL HISTORY Bilateral 9/8/2015    Procedure: TRANSFORAMINAL LUMBAR EPIDURAL STEROID INJECTION MULTIPLE LEVEL AND BILATERAL;  Surgeon: Dominguez Prasad MD;  Location: [facility]    OTHER SURGICAL HISTORY Right 11/3/2015    Procedure: LUMBAR FACET INJECTION;  Surgeon: Dominguez Prasad MD;  Location: [facility]    OTHER SURGICAL HISTORY Right 12/17/2015    Procedure: RADIOFREQUENCY ABLATION OF THORACIC OR LUMBAR MEDIAL BRANCH;  Surgeon: Dominguez Prasad MD;  Location: [facility]    OTHER SURGICAL HISTORY Left 12/28/2015    Procedure: LUMBAR FACET INJECTION;  Surgeon: Dominguez Prasad MD;  Location:  MAIN OR    OTHER SURGICAL HISTORY Left 1/19/2016    Procedure: RADIOFREQUENCY ABLATION OF THORACIC OR LUMBAR MEDIAL BRANCH;  Surgeon: Dominguez Prasad MD;  Location: [facility]    OTHER SURGICAL HISTORY Right 3/22/2016    Procedure: ILIOINGUINAL NERVE BLOCK;  Surgeon: Dominguez Prasad MD;  Location:  MAIN OR    REMOVAL OF TALUS Right 11/2019     Social History     Socioeconomic History    Marital status:    Tobacco Use    Smoking status: Former     Types: Cigarettes     Quit date: 10/3/2013     Years since quitting: 10.2    Smokeless tobacco: Never   Vaping Use    Vaping Use: Never used    Substance and Sexual Activity    Alcohol use: Not Currently     Alcohol/week: 0.0 standard drinks of alcohol     Comment: rare    Drug use: Yes     Frequency: 7.0 times per week     Types: Cannabis      Family History   Problem Relation Age of Onset    Cancer Maternal Aunt         Breast    Hypertension Father        Physical Exam  Height: --  Weight: --  BSA (Calculated - sq m): --  Pulse: --  BP: --  Temp: --  Do Not Use - Resp Rate: --  SpO2: --     Telephone visit     Results:  Lab Results   Component Value Date    WBC 6.4 07/11/2023    HGB 15.5 07/11/2023    HCT 45.8 07/11/2023    MCV 86.9 07/11/2023    .0 07/11/2023     Lab Results   Component Value Date     07/11/2023    K 3.9 07/11/2023    CO2 24.0 07/11/2023     07/11/2023    BUN 13 07/11/2023    ALB 3.9 07/11/2023       Radiology: reviewed   04/21/21: RLE doppler  1. Deep vein thrombus within the right popliteal vein which may be chronic with given history of popliteal vein DVT 3 months ago.  Prior studies are not available for comparison   2. Findings communicated to clinical team by radiology staff on 4/21/2021.       07/28/21: RLE Doppler  1. Deep vein thrombus within the right popliteal vein which may be chronic with given history of popliteal vein DVT 3 months ago.  Prior studies are not available for comparison   2. Findings communicated to clinical team by radiology staff on 4/21/2021.       Pathology: n/a    Assessment and Plan:  RLE DVT in right popliteal vein:   -Initially Diagnosed on 1/21/2021.    He has a history of RLE R Popliteal DVT which was diagnosed on 01/21/21. I suspect that this is provoked in the setting of a right ankle surgery and prolonged limited mobility.  His surgery was on July 28, 2020 and his initial Doppler on August 27, 2020 was negative.  While the duration between surgery and DVT slightly longer than normal, it is highly suspicious that his DVT was provoked and occurred sometime in between September  2020 and January 2021 due to functional immobility.  However, given the time lapse, we are considering this a \"Semi provoked\" DVT.  Given the prolonged period between his surgery and DVT diagnosis, a repeat doppler on 4/21/21 (3 months later) showed a chronic appear R Popliteal DVT along with a negative D-dimer. He had another RLE US done on 7/28/21 which showed a stable occlusive R Popliteal thrombus. We discussed that Residual Vein obstruction is not necessarily considered a risk for future DVT. WE discussed that if his VTE was provoked by surgery, his risk for future DVT is ~3-5% in 5 years. If his DVT was unprovoked, he has a ~30% risk at 5 years. He stopped his Eliquis in 07/2021 and serial D-dimers were negative until 09/2021.    His follow up dopplers have shown a persistent RLE popliteal vein DVT with occasional mention of possible propagation however his D-dimers remain normal. WE will continue with 3 more months of a/c and repeat doppler, dimer and a hypercoagulable work up given his young age. We may need to consider extended a/c with either xarelto 10 mg daily or eliquis 2.5 mg BID indefinitely.     Thoracic aortic aneurysm: 4.3 cm.    - Following with Dr. Rogers at [facility].      >15 min    [facility]

## 2024-01-10 ENCOUNTER — VIRTUAL PHONE E/M (OUTPATIENT)
Dept: HEMATOLOGY/ONCOLOGY | Facility: HOSPITAL | Age: 39
End: 2024-01-10
Attending: INTERNAL MEDICINE
Payer: COMMERCIAL

## 2024-01-10 DIAGNOSIS — I82.431 ACUTE DEEP VEIN THROMBOSIS (DVT) OF RIGHT POPLITEAL VEIN (HCC): Primary | ICD-10-CM

## 2024-01-10 DIAGNOSIS — Z86.718 HISTORY OF DVT (DEEP VEIN THROMBOSIS): ICD-10-CM

## 2024-01-10 PROCEDURE — G2252 BRIEF CHKIN BY MD/QHP, 11-20: HCPCS | Performed by: INTERNAL MEDICINE

## 2024-01-22 ENCOUNTER — TELEPHONE (OUTPATIENT)
Dept: ORTHOPEDICS | Age: 39
End: 2024-01-22

## 2024-01-22 DIAGNOSIS — T85.848D PAIN DUE TO ANY DEVICE, IMPLANT OR GRAFT, SUBSEQUENT ENCOUNTER: Primary | ICD-10-CM

## 2024-01-22 DIAGNOSIS — Z98.890 S/P FOOT SURGERY, RIGHT: ICD-10-CM

## 2024-01-25 ENCOUNTER — E-ADVICE (OUTPATIENT)
Dept: PODIATRY | Age: 39
End: 2024-01-25

## 2024-01-29 ENCOUNTER — HOSPITAL ENCOUNTER (OUTPATIENT)
Age: 39
Discharge: HOME | End: 2024-01-29
Payer: COMMERCIAL

## 2024-01-29 VITALS
OXYGEN SATURATION: 99 % | SYSTOLIC BLOOD PRESSURE: 130 MMHG | RESPIRATION RATE: 20 BRPM | HEART RATE: 90 BPM | DIASTOLIC BLOOD PRESSURE: 87 MMHG

## 2024-01-29 DIAGNOSIS — M54.2 NECK PAIN ON RIGHT SIDE: Primary | ICD-10-CM

## 2024-01-29 PROCEDURE — 99213 OFFICE O/P EST LOW 20 MIN: CPT | Performed by: PHYSICIAN ASSISTANT

## 2024-01-29 RX ORDER — PREDNISONE 20 MG/1
60 TABLET ORAL DAILY
Qty: 15 TABLET | Refills: 0 | Status: SHIPPED | OUTPATIENT
Start: 2024-01-29 | End: 2024-02-03

## 2024-01-29 RX ORDER — CYCLOBENZAPRINE HCL 10 MG
10 TABLET ORAL 3 TIMES DAILY PRN
Qty: 20 TABLET | Refills: 0 | Status: SHIPPED | OUTPATIENT
Start: 2024-01-29 | End: 2024-02-05

## 2024-01-30 NOTE — ED INITIAL ASSESSMENT (HPI)
Patient states he had a history of a herniated disc in his neck. C/O post neck pain that radiates down into his post right shoulder blade and right arm for 1 week.

## 2024-01-30 NOTE — ED PROVIDER NOTES
Patient Seen in: [facility]      History     Chief Complaint   Patient presents with    Neck Pain    Arm Pain     Stated Complaint: neck and arm pain    Subjective:   HPI    38-year-old male presents to the  for evaluation of right-sided neck pain that radiates down right arm.  History of a herniated disc.  Denies any injury.  Pain started a week ago.    Patient states he has a pain contract with his pain doctor, so he cannot get any Norco.  Patient wants prednisone as it has helped him in the past.    Objective:   Past Medical History:   Diagnosis Date    Anxiety 1/23/2015    Attention deficit disorder with hyperactivity(314.01)     Depressive disorder, not elsewhere classified     Essential hypertension     Lumbosacral radiculopathy 9/24/2015    R    Obstructive sleep apnea (adult) (pediatric) 6/29/2012    Doesn't want mouth or cpap     Panic disorder 6/11/2015    Rhabdomyolysis 6/16/2015    Labs 6.2015    Right low back pain 6/11/2015    Sciatica 6/11/2015              Past Surgical History:   Procedure Laterality Date    HAND/FINGER SURGERY UNLISTED      pin placed for fracture    KNEE SURGERY  06/21/2022    KNEE REVISION SUBTALAR FUSION    OTHER Right 2020    fusion of right subtalar joint    OTHER SURGICAL HISTORY      Tooth extraction    OTHER SURGICAL HISTORY Bilateral 8/24/2015    Procedure: TRANSFORAMINAL LUMBAR EPIDURAL STEROID INJECTION MULTIPLE LEVEL AND BILATERAL;  Surgeon: Dominguez Prasad MD;  Location:  MAIN OR    OTHER SURGICAL HISTORY Bilateral 9/1/2015    Procedure: TRANSFORAMINAL LUMBAR EPIDURAL STEROID INJECTION MULTIPLE LEVEL AND BILATERAL;  Surgeon: Dominguez Prasad MD;  Location:  MAIN OR    OTHER SURGICAL HISTORY Bilateral 9/8/2015    Procedure: TRANSFORAMINAL LUMBAR EPIDURAL STEROID INJECTION MULTIPLE LEVEL AND BILATERAL;  Surgeon: Dominguez Prasad MD;  Location:  MAIN OR    OTHER SURGICAL HISTORY Right 11/3/2015    Procedure: LUMBAR FACET INJECTION;  Surgeon: Osmar  Dominguez WILSON MD;  Location: [facility]    OTHER SURGICAL HISTORY Right 12/17/2015    Procedure: RADIOFREQUENCY ABLATION OF THORACIC OR LUMBAR MEDIAL BRANCH;  Surgeon: Dominguez Prasad MD;  Location: [facility]    OTHER SURGICAL HISTORY Left 12/28/2015    Procedure: LUMBAR FACET INJECTION;  Surgeon: Dominguez Prasad MD;  Location: [facility]    OTHER SURGICAL HISTORY Left 1/19/2016    Procedure: RADIOFREQUENCY ABLATION OF THORACIC OR LUMBAR MEDIAL BRANCH;  Surgeon: Dominguez Prasad MD;  Location: [facility]    OTHER SURGICAL HISTORY Right 3/22/2016    Procedure: ILIOINGUINAL NERVE BLOCK;  Surgeon: Dominguez Prasad MD;  Location: [facility]    REMOVAL OF TALUS Right 11/2019                Social History     Socioeconomic History    Marital status:    Tobacco Use    Smoking status: Former     Types: Cigarettes     Quit date: 10/3/2013     Years since quitting: 10.3    Smokeless tobacco: Never   Vaping Use    Vaping Use: Never used   Substance and Sexual Activity    Alcohol use: Not Currently     Alcohol/week: 0.0 standard drinks of alcohol     Comment: rare    Drug use: Yes     Frequency: 7.0 times per week     Types: Cannabis   Other Topics Concern    Caffeine Concern Yes     Comment: 1 can of soda weekly     Exercise No    Seat Belt Yes              Review of Systems    Positive for stated complaint: neck and arm pain  Other systems are as noted in HPI.  Constitutional and vital signs reviewed.      All other systems reviewed and negative except as noted above.    Physical Exam     ED Triage Vitals [01/29/24 1916]   /87   Pulse 90   Resp 20   Temp    Temp src    SpO2 99 %   O2 Device None (Room air)       Current:/87   Pulse 90   Resp 20   SpO2 99%         Physical Exam  Vitals and nursing note reviewed.   Constitutional:       Appearance: He is well-developed.   HENT:      Head: Atraumatic.      Right Ear: External ear normal.      Left Ear: External ear normal.   Eyes:       Conjunctiva/sclera: Conjunctivae normal.   Cardiovascular:      Rate and Rhythm: Normal rate.   Pulmonary:      Effort: Pulmonary effort is normal.   Musculoskeletal:      Cervical back: Normal range of motion and neck supple.      Comments: No C spine tenderness. Has right paravertebral muscle tenderness along C spine.    BUE with full ROM and appropriate strength. 2+ radial pulse   Skin:     General: Skin is warm and dry.      Capillary Refill: Capillary refill takes less than 2 seconds.   Neurological:      Mental Status: He is alert.   Psychiatric:         Mood and Affect: Mood normal.               ED Course   Labs Reviewed - No data to display                   MDM      38-year-old male presents with right-sided neck pain that radiates down right arm.    Differential diagnosis includes but not limited to:  Cervical radiculopathy.  Muscle spasm    No trauma, no indication for XRAY.    Will treat with prednisone and Flexeril.  Patient has [place] at home.  Will refer to [facility] for follow up. All questions answered.                                   Medical Decision Making      Disposition and Plan     Clinical Impression:  1. Neck pain on right side         Disposition:  Discharge  1/29/2024  7:29 pm    Follow-up:  Tom Mendoza MD  [facility]   [address] [address]  [phone]                Medications Prescribed:  Current Discharge Medication List        START taking these medications    Details   cyclobenzaprine 10 MG Oral Tab Take 1 tablet (10 mg total) by mouth 3 (three) times daily as needed for Muscle spasms.  Qty: 20 tablet, Refills: 0      predniSONE 20 MG Oral Tab Take 3 tablets (60 mg total) by mouth daily for 5 days.  Qty: 15 tablet, Refills: 0

## 2024-01-30 NOTE — DISCHARGE INSTRUCTIONS
Apply heat and take muscle relaxant as needed.  Prednisone will help with pinched nerve symptoms.  Follow up with spine surgery.

## 2024-01-31 ENCOUNTER — APPOINTMENT (OUTPATIENT)
Dept: PHYSICAL THERAPY | Age: 39
End: 2024-01-31
Attending: PODIATRIST

## 2024-01-31 DIAGNOSIS — R26.9 GAIT DIFFICULTY: ICD-10-CM

## 2024-01-31 DIAGNOSIS — S92.131P: ICD-10-CM

## 2024-01-31 DIAGNOSIS — M25.571 CHRONIC PAIN OF RIGHT ANKLE: Primary | ICD-10-CM

## 2024-01-31 DIAGNOSIS — G89.29 CHRONIC PAIN OF RIGHT ANKLE: Primary | ICD-10-CM

## 2024-01-31 ASSESSMENT — ENCOUNTER SYMPTOMS
ALLEVIATING FACTORS: ICE
QUALITY: ACHE
ALLEVIATING FACTORS: PRESCRIPTION MEDICATIONS
PAIN SEVERITY NOW: 7
PAIN SCALE AT LOWEST: 4
ALLEVIATING FACTORS: REST
PAIN FREQUENCY: CONSTANT
PAIN SCALE AT HIGHEST: 7
SUBJECTIVE PAIN PROGRESSION: IMPROVED
QUALITY: DISCOMFORT
ALLEVIATING FACTORS: AVOIDING MOVEMENT IN INVOLVED AREA
QUALITY: SHARP
QUALITY: SORE

## 2024-01-31 ASSESSMENT — MOVEMENT AND STRENGTH ASSESSMENTS
WALKING BETWEEN ROOMS: MODERATE DIFFICULTY
GETTING INTO OR OUT OF THE BATH: QUITE A BIT OF DIFFICULTY
GETTING INTO OR OUT OF A CAR: MODERATE DIFFICULTY
STANDING FOR 1 HOUR: EXTREME DIFFICULTY OR UNABLE TO PERFORM ACTIVITY
SITTING FOR 1 HOUR: MODERATE DIFFICULTY
PERFORMING HEAVY ACTIVITIES AROUND YOUR HOME: EXTREME DIFFICULTY OR UNABLE TO PERFORM ACTIVITY
WALKING 2 BLOCKS: MODERATE DIFFICULTY
HOPPING: EXTREME DIFFICULTY OR UNABLE TO PERFORM ACTIVITY
WALKING A MILE: QUITE A BIT OF DIFFICULTY
RUNNING ON EVEN GROUND: EXTREME DIFFICULTY OR UNABLE TO PERFORM ACTIVITY
ANY OF YOUR USUAL WORK, HOUSEWORK OR SCHOOL ACTIVITIES: QUITE A BIT OF DIFFICULTY
PERFORMING LIGHT ACTIVITES AROUND YOUR HOME: MODERATE DIFFICULTY
PUTTING ON YOUR SHOES OR SOCKS: NO DIFFICULTY
ROLLING OVER IN BED: NO DIFFICULTY
LIFTING AN OBJECT, LIKE A BAG OF GROCERIES, FROM THE FLOOR: MODERATE DIFFICULTY
RUNNING ON UNEVEN GROUND: EXTREME DIFFICULTY OR UNABLE TO PERFORM ACTIVITY
MAKING SHARP TURNS WHILE RUNNING FAST: EXTREME DIFFICULTY OR UNABLE TO PERFORM ACTIVITY
TOTAL SCORE: 32.5
SQUATTING: MODERATE DIFFICULTY
GOING UP OR DOWN 10 STAIRS (ABOUT 1 FLIGHT OF STAIRS): QUITE A BIT OF DIFFICULTY
YOUR USUAL HOBBIES, RECREATIONAL OR SPORTING ACTIVIITIES: EXTREME DIFFICULTY OR UNABLE TO PERFORM ACTIVITY

## 2024-02-08 ENCOUNTER — E-VISIT (OUTPATIENT)
Dept: TELEHEALTH | Age: 39
End: 2024-02-08
Payer: COMMERCIAL

## 2024-02-08 DIAGNOSIS — M54.9 ACUTE BACK PAIN, UNSPECIFIED BACK LOCATION, UNSPECIFIED BACK PAIN LATERALITY: Primary | ICD-10-CM

## 2024-02-08 PROCEDURE — 99421 OL DIG E/M SVC 5-10 MIN: CPT | Performed by: NURSE PRACTITIONER

## 2024-02-08 NOTE — PROGRESS NOTES
Nayan Shipman is a 38 year old male.  HPI:   See answers to questions above.   Upper back pain. Reported pain is severe. Seen on 1/29 at [facility] care for this issue, treated with flexeril and prednisone. Patient requesting refill on prednisone.   Current Outpatient Medications   Medication Sig Dispense Refill    apixaban 5 MG Oral Tab Take 1 tablet (5 mg total) by mouth 2 (two) times daily. 60 tablet 3    HYDROcodone-acetaminophen  MG Oral Tab TAKE 1 TABLET BY MOUTH NO MORE THAN EVERY 6 HOURS FOR BREAKTHROUGH PAIN      ALPRAZolam 1 MG Oral Tab Take 1 tablet (1 mg total) by mouth 3 (three) times daily as needed.      amphetamine-dextroamphetamine 10 MG Oral Tab Take 1 tablet (10 mg total) by mouth 3 (three) times daily.        Past Medical History:   Diagnosis Date    Anxiety 1/23/2015    Attention deficit disorder with hyperactivity(314.01)     Depressive disorder, not elsewhere classified     Essential hypertension     Lumbosacral radiculopathy 9/24/2015    R    Obstructive sleep apnea (adult) (pediatric) 6/29/2012    Doesn't want mouth or cpap     Panic disorder 6/11/2015    Rhabdomyolysis 6/16/2015    Labs 6.2015    Right low back pain 6/11/2015    Sciatica 6/11/2015      Past Surgical History:   Procedure Laterality Date    HAND/FINGER SURGERY UNLISTED      pin placed for fracture    KNEE SURGERY  06/21/2022    KNEE REVISION SUBTALAR FUSION    OTHER Right 2020    fusion of right subtalar joint    OTHER SURGICAL HISTORY      Tooth extraction    OTHER SURGICAL HISTORY Bilateral 8/24/2015    Procedure: TRANSFORAMINAL LUMBAR EPIDURAL STEROID INJECTION MULTIPLE LEVEL AND BILATERAL;  Surgeon: Dominguez Prasad MD;  Location:  MAIN OR    OTHER SURGICAL HISTORY Bilateral 9/1/2015    Procedure: TRANSFORAMINAL LUMBAR EPIDURAL STEROID INJECTION MULTIPLE LEVEL AND BILATERAL;  Surgeon: Dominguez Prasad MD;  Location:  MAIN OR    OTHER SURGICAL HISTORY Bilateral 9/8/2015    Procedure: TRANSFORAMINAL LUMBAR  EPIDURAL STEROID INJECTION MULTIPLE LEVEL AND BILATERAL;  Surgeon: Dominguez Prasad MD;  [facility]    OTHER SURGICAL HISTORY Right 11/3/2015    Procedure: LUMBAR FACET INJECTION;  Surgeon: Dominguez Prasad MD;  Location: [facility]    OTHER SURGICAL HISTORY Right 12/17/2015    Procedure: RADIOFREQUENCY ABLATION OF THORACIC OR LUMBAR MEDIAL BRANCH;  Surgeon: Dominguez Prasad MD;  [facility]    OTHER SURGICAL HISTORY Left 12/28/2015    Procedure: LUMBAR FACET INJECTION;  Surgeon: Dominguez Prasad MD;  [facility]: [facility]    OTHER SURGICAL HISTORY Left 1/19/2016    Procedure: RADIOFREQUENCY ABLATION OF THORACIC OR LUMBAR MEDIAL BRANCH;  Surgeon: Dominguez Prasad MD;  Location: [facility]    OTHER SURGICAL HISTORY Right 3/22/2016    Procedure: ILIOINGUINAL NERVE BLOCK;  Surgeon: Dominguez Prasad MD;  Location: [facility]    REMOVAL OF TALUS Right 11/2019      Family History   Problem Relation Age of Onset    Cancer Maternal Aunt         Breast    Hypertension Father       Social History:  Social History     Socioeconomic History    Marital status:    Tobacco Use    Smoking status: Former     Types: Cigarettes     Quit date: 10/3/2013     Years since quitting: 10.3    Smokeless tobacco: Never   Vaping Use    Vaping Use: Never used   Substance and Sexual Activity    Alcohol use: Not Currently     Alcohol/week: 0.0 standard drinks of alcohol     Comment: rare    Drug use: Yes     Frequency: 7.0 times per week     Types: Cannabis   Other Topics Concern    Caffeine Concern Yes     Comment: 1 can of soda weekly     Exercise No    Seat Belt Yes         ASSESSMENT AND PLAN:     No diagnosis found.    Recommended follow up with [facility]cp and [facility] as recommended at time of IC visit.   Advised to seek urgent follow up in IC or ER if he feels pain is worsening compared to last visit or if he develops any new/severe symptoms such as weakness.     Meds & Refills for this Visit:  Requested  Prescriptions      No prescriptions requested or ordered in this encounter       Duration of  the service:  6 minutes    Patient advised to follow up with PCP if no improvement or worsening of symptoms  Refer to MyChart message for specific patient instructions

## 2024-02-13 ENCOUNTER — TELEPHONE (OUTPATIENT)
Dept: HEMATOLOGY/ONCOLOGY | Facility: HOSPITAL | Age: 39
End: 2024-02-13

## 2024-02-13 ENCOUNTER — TELEPHONE (OUTPATIENT)
Dept: FAMILY MEDICINE CLINIC | Facility: CLINIC | Age: 39
End: 2024-02-13

## 2024-02-13 DIAGNOSIS — M54.2 CHRONIC NECK PAIN: Primary | ICD-10-CM

## 2024-02-13 DIAGNOSIS — G89.29 CHRONIC RADICULAR CERVICAL PAIN: ICD-10-CM

## 2024-02-13 DIAGNOSIS — G89.29 CHRONIC NECK PAIN: Primary | ICD-10-CM

## 2024-02-13 DIAGNOSIS — M54.12 CHRONIC RADICULAR CERVICAL PAIN: ICD-10-CM

## 2024-02-13 NOTE — TELEPHONE ENCOUNTER
Pt calling to see if he can have a sooner appt with Dr. Nava. Pt was seen at [facility] for back pain. Pt is scheduled for   Future Appointments   Date Time Provider Department Center   3/25/2024  8:30 AM Kevin Nava MD [facility] [place]     Please advise on if sooner appt is available. Thank you

## 2024-02-14 NOTE — TELEPHONE ENCOUNTER
Spoke w/pt. Spoke w/Dr. Nava. Placed orders for MRI cervical spine. Pt.'s pain clinic  Had put an order in for one but pt. Was hoping to have it done at an [place] facility so asking Dr. Nava to place order. Orders placed.

## 2024-02-18 ENCOUNTER — HOSPITAL ENCOUNTER (OUTPATIENT)
Age: 39
Discharge: HOME | End: 2024-02-18
Payer: COMMERCIAL

## 2024-02-18 VITALS
HEART RATE: 106 BPM | DIASTOLIC BLOOD PRESSURE: 74 MMHG | SYSTOLIC BLOOD PRESSURE: 116 MMHG | TEMPERATURE: 100 F | RESPIRATION RATE: 16 BRPM | OXYGEN SATURATION: 97 %

## 2024-02-18 DIAGNOSIS — J02.0 STREPTOCOCCAL SORE THROAT: Primary | ICD-10-CM

## 2024-02-18 LAB — S PYO AG THROAT QL: POSITIVE

## 2024-02-18 PROCEDURE — 99213 OFFICE O/P EST LOW 20 MIN: CPT | Performed by: NURSE PRACTITIONER

## 2024-02-18 PROCEDURE — 87880 STREP A ASSAY W/OPTIC: CPT | Performed by: NURSE PRACTITIONER

## 2024-02-18 RX ORDER — AMOXICILLIN 500 MG/1
500 TABLET, FILM COATED ORAL 2 TIMES DAILY
Qty: 20 TABLET | Refills: 0 | Status: SHIPPED | OUTPATIENT
Start: 2024-02-18 | End: 2024-02-28

## 2024-02-18 NOTE — ED PROVIDER NOTES
Patient Seen in: [facility]      History     Chief Complaint   Patient presents with    Sore Throat     Stated Complaint: Sore throat    Subjective:   HPI    38-year-old male presents today with complaints of sore throat.  Patient states that his wife is positive for strep throat.    Objective:   Past Medical History:   Diagnosis Date    Anxiety 1/23/2015    Attention deficit disorder with hyperactivity(314.01)     Depressive disorder, not elsewhere classified     Essential hypertension     Lumbosacral radiculopathy 9/24/2015    R    Obstructive sleep apnea (adult) (pediatric) 6/29/2012    Doesn't want mouth or cpap     Panic disorder 6/11/2015    Rhabdomyolysis 6/16/2015    Labs 6.2015    Right low back pain 6/11/2015    Sciatica 6/11/2015              Past Surgical History:   Procedure Laterality Date    HAND/FINGER SURGERY UNLISTED      pin placed for fracture    KNEE SURGERY  06/21/2022    KNEE REVISION SUBTALAR FUSION    OTHER Right 2020    fusion of right subtalar joint    OTHER SURGICAL HISTORY      Tooth extraction    OTHER SURGICAL HISTORY Bilateral 8/24/2015    Procedure: TRANSFORAMINAL LUMBAR EPIDURAL STEROID INJECTION MULTIPLE LEVEL AND BILATERAL;  Surgeon: Dominguez Prasad MD;  Location:  MAIN OR    OTHER SURGICAL HISTORY Bilateral 9/1/2015    Procedure: TRANSFORAMINAL LUMBAR EPIDURAL STEROID INJECTION MULTIPLE LEVEL AND BILATERAL;  Surgeon: Dominguez Prasad MD;  Location:  MAIN OR    OTHER SURGICAL HISTORY Bilateral 9/8/2015    Procedure: TRANSFORAMINAL LUMBAR EPIDURAL STEROID INJECTION MULTIPLE LEVEL AND BILATERAL;  Surgeon: Dominguez Prasad MD;  Location:  MAIN OR    OTHER SURGICAL HISTORY Right 11/3/2015    Procedure: LUMBAR FACET INJECTION;  Surgeon: Dominguez Prasad MD;  Location:  MAIN OR    OTHER SURGICAL HISTORY Right 12/17/2015    Procedure: RADIOFREQUENCY ABLATION OF THORACIC OR LUMBAR MEDIAL BRANCH;  Surgeon: Dominguez Prasad MD;  Location:  MAIN OR    OTHER  SURGICAL HISTORY Left 12/28/2015    Procedure: LUMBAR FACET INJECTION;  Surgeon: Dominguez Prasad MD;  Location: [facility]    OTHER SURGICAL HISTORY Left 1/19/2016    Procedure: RADIOFREQUENCY ABLATION OF THORACIC OR LUMBAR MEDIAL BRANCH;  Surgeon: Dominguez Prasad MD;  Location: [facility]    OTHER SURGICAL HISTORY Right 3/22/2016    Procedure: ILIOINGUINAL NERVE BLOCK;  Surgeon: Dominguez Prasad MD;  Location: [facility]    REMOVAL OF TALUS Right 11/2019                Social History     Socioeconomic History    Marital status:    Tobacco Use    Smoking status: Former     Types: Cigarettes     Quit date: 10/3/2013     Years since quitting: 10.3    Smokeless tobacco: Never   Vaping Use    Vaping Use: Never used   Substance and Sexual Activity    Alcohol use: Not Currently     Alcohol/week: 0.0 standard drinks of alcohol     Comment: rare    Drug use: Yes     Frequency: 7.0 times per week     Types: Cannabis   Other Topics Concern    Caffeine Concern Yes     Comment: 1 can of soda weekly     Exercise No    Seat Belt Yes              Review of Systems   Constitutional: Negative.    HENT:  Positive for sore throat.    Eyes: Negative.    Respiratory: Negative.     Cardiovascular: Negative.    Gastrointestinal: Negative.    Endocrine: Negative.    Genitourinary: Negative.    Musculoskeletal: Negative.    Skin: Negative.    Allergic/Immunologic: Negative.    Neurological: Negative.    Hematological: Negative.    Psychiatric/Behavioral: Negative.         Positive for stated complaint: Sore throat  Other systems are as noted in HPI.  Constitutional and vital signs reviewed.      All other systems reviewed and negative except as noted above.    Physical Exam     ED Triage Vitals [02/18/24 0817]   /74   Pulse 106   Resp 16   Temp 99.6 °F (37.6 °C)   Temp src Temporal   SpO2 97 %   O2 Device None (Room air)       Current:/74   Pulse 106   Temp 99.6 °F (37.6 °C) (Temporal)   Resp 16   SpO2 97%          Physical Exam  Vitals and nursing note reviewed.   Constitutional:       Appearance: He is well-developed and normal weight.   HENT:      Head: Normocephalic.      Mouth/Throat:      Pharynx: Posterior oropharyngeal erythema present.      Tonsils: Tonsillar exudate present. No tonsillar abscesses. 0 on the right. 0 on the left.   Eyes:      Conjunctiva/sclera: Conjunctivae normal.      Pupils: Pupils are equal, round, and reactive to light.   Pulmonary:      Effort: Pulmonary effort is normal.   Musculoskeletal:      Cervical back: Normal range of motion and neck supple.   Skin:     Capillary Refill: Capillary refill takes 2 to 3 seconds.   Neurological:      General: No focal deficit present.      Mental Status: He is alert.   Psychiatric:         Mood and Affect: Mood normal.             ED Course     Labs Reviewed   POCT RAPID STREP - Abnormal; Notable for the following components:       Result Value    POCT Rapid Strep Positive (*)     All other components within normal limits                      MDM      38-year-old male presents today with complaints of sore throat.  Patient states that his wife is positive for strep throat.  VS: See EMR  Physical exam: See exam  Diff Diag: Strep, pharyngitis  Recent Results (from the past 24 hour(s))   POCT Rapid Strep    Collection Time: 02/18/24  8:29 AM   Result Value Ref Range    POCT Rapid Strep Positive (A) Negative     Will diagnosed with strep throat and treat with amoxicillin.  Patient is to follow-up with primary care provider as needed.  ED precautions given.  Note to Patient  The 21st Century Cures Act makes medical notes like these available to patients in the interest of transparency. However, be advised this is a medical document and is intended as isro-hl-vtfj communication; it is written in medical language and may appear blunt, direct, or contain abbreviations or verbiage that are unfamiliar. Medical documents are intended to carry relevant  information, facts as evident, and the clinical opinion of the practitioner.     This report has been produced using speech recognition software, and may contain errors related to grammar, punctuation, spelling, words or phrases unrecognized or not translated appropriately to text; these errors may be referred to the dictating provider for further clarification and/or addendum as needed.                                     Medical Decision Making  38-year-old male presents today with complaints of sore throat.  Patient states that his wife is positive for strep throat.    Problems Addressed:  Streptococcal sore throat: acute illness or injury    Amount and/or Complexity of Data Reviewed  Labs: ordered. Decision-making details documented in ED Course.    Risk  OTC drugs.  Prescription drug management.        Disposition and Plan     Clinical Impression:  1. Streptococcal sore throat         Disposition:  Discharge  2/18/2024  8:33 am    Follow-up:  Kevin Nava MD  [address] [address]  [phone]    In 2 days  Urgent care follow up          Medications Prescribed:  Current Discharge Medication List        START taking these medications    Details   amoxicillin 500 MG Oral Tab Take 1 tablet (500 mg total) by mouth 2 (two) times daily for 10 days.  Qty: 20 tablet, Refills: 0

## 2024-02-26 ENCOUNTER — APPOINTMENT (OUTPATIENT)
Dept: CT IMAGING | Age: 39
End: 2024-02-26
Attending: PODIATRIST

## 2024-02-26 DIAGNOSIS — Z98.890 S/P FOOT SURGERY, RIGHT: ICD-10-CM

## 2024-02-26 DIAGNOSIS — Z98.890 STATUS POST HARDWARE REMOVAL: ICD-10-CM

## 2024-02-26 DIAGNOSIS — T85.848D PAIN DUE TO ANY DEVICE, IMPLANT OR GRAFT, SUBSEQUENT ENCOUNTER: ICD-10-CM

## 2024-02-26 PROCEDURE — 73700 CT LOWER EXTREMITY W/O DYE: CPT | Performed by: RADIOLOGY

## 2024-03-01 ENCOUNTER — APPOINTMENT (OUTPATIENT)
Dept: PODIATRY | Age: 39
End: 2024-03-01

## 2024-03-04 ENCOUNTER — TELEPHONE (OUTPATIENT)
Dept: FAMILY MEDICINE CLINIC | Facility: CLINIC | Age: 39
End: 2024-03-04

## 2024-03-04 NOTE — TELEPHONE ENCOUNTER
Patient calling in states he would like for  [place] coming [place] on 3/14/2024.

## 2024-03-05 NOTE — TELEPHONE ENCOUNTER
Spoke to patient and directed him to [email or web address] as they are the site pt's are referred to for this issue.  Pt verbalized understanding.  He did state though that provider approved this for pt 3 years ago.

## 2024-03-13 ENCOUNTER — HOSPITAL ENCOUNTER (OUTPATIENT)
Dept: MRI IMAGING | Age: 39
Discharge: HOME | End: 2024-03-13
Attending: FAMILY MEDICINE
Payer: COMMERCIAL

## 2024-03-13 DIAGNOSIS — G89.29 CHRONIC RADICULAR CERVICAL PAIN: ICD-10-CM

## 2024-03-13 DIAGNOSIS — G89.29 CHRONIC NECK PAIN: ICD-10-CM

## 2024-03-13 DIAGNOSIS — M54.2 CHRONIC NECK PAIN: ICD-10-CM

## 2024-03-13 DIAGNOSIS — M54.12 CHRONIC RADICULAR CERVICAL PAIN: ICD-10-CM

## 2024-03-13 PROCEDURE — 72141 MRI NECK SPINE W/O DYE: CPT | Performed by: FAMILY MEDICINE

## 2024-03-30 ENCOUNTER — E-ADVICE (OUTPATIENT)
Dept: PODIATRY | Age: 39
End: 2024-03-30

## 2024-04-01 ENCOUNTER — TELEPHONE (OUTPATIENT)
Dept: HEMATOLOGY/ONCOLOGY | Facility: HOSPITAL | Age: 39
End: 2024-04-01

## 2024-04-01 NOTE — TELEPHONE ENCOUNTER
LVM for [facility]. Letter not received as of yet. Requested call back. Fax number also provided for letter to be resent.

## 2024-04-01 NOTE — TELEPHONE ENCOUNTER
[facility] : [facility] [phone]  I sent over a fax that need to be completed and returned. I would like to know if   received  this. The request also included a request for the ELIQUIS  to  be stopped  for  procedure 3 days  prior  to and when to start after. Would like a call back. Thanks Shannan

## 2024-04-18 ENCOUNTER — HOSPITAL ENCOUNTER (OUTPATIENT)
Dept: ULTRASOUND IMAGING | Age: 39
Discharge: HOME | End: 2024-04-18
Attending: INTERNAL MEDICINE
Payer: COMMERCIAL

## 2024-04-18 DIAGNOSIS — Z86.718 HISTORY OF DVT (DEEP VEIN THROMBOSIS): ICD-10-CM

## 2024-04-18 PROCEDURE — 93971 EXTREMITY STUDY: CPT | Performed by: INTERNAL MEDICINE

## 2024-04-22 NOTE — PROGRESS NOTES
[facility] Note    Visit Diagnosis:  1. History of DVT (deep vein thrombosis)        History of Present Illness: 38M with a PMH of HTN, ADHD, VIMAL and lumbar radiculopathy referred by Dr. Nava to discuss DVT management. He has a history of a RLE Popliteal DVT (dx 01/2021).    Hematology History  -11/2019: R ankle surgery. Was immobilized for prolonged period.     -7/28/2020: Right subtalar joint fusion, right placement of autograft bone at [facility].  Postoperative treatment included immobilization in a posterior splint followed by a short leg cast and and nonweightbearing. He states that he was non-weight bearing and was immobilized until around 11/2020. He states that he developed RLE pain/swelling around 1 month after his surgery.     -08/27/20: RLE Doppler: Negative for DVT. He states that after this US, his RLE pain and swelling remained the same.     -1/21/21: RLE Doppler: DVT in R popliteal vein    -1/22/21: CTA Chest at [facility]: Negative for PE. Noted to have 4.2 thoracic aneurysm.    -He was started on Xarelto on 01/21/21 for about 1 month but switched to Eliquis due to cost. He states that he still has some RLE pain/swelling.     -04/21/21: RLE Doppler: chronic R popliteal DVT    -04/27/21: D-dimer negative     -06/27/21: Presented to [facility] for RLE Swelling. RLE US at [facility] was negative for DVT. No evidence of PE.     -07/28/21: RLE Doppler: Stable appearance of popliteal vein thrombosis     -07/2021: He stopped Eliquis-->dimer on 07/30/21 negative    -09/02/2021: D-dimer negative-->2 month follow up dimer requested but not done    -He presented to [facility] on 6/12/23 for R Knee pain. CT unremarkable. RLE Doppler showed a DVT in the R Popliteal vein. No comparison made. No D-dimer. HE was started on Eliquis. He does not smoke. There is no FH or personal history of VTE. No bleeding associated with anticoagulation.     -7/12/23: Reviewed most recent US. There is a  persistent RLE DVT in 2 small paired popliteal veins distally. His D-dimer is normal which suggests a chronic clot. He will continue Eliquis 5 mg BID and stop 2 days before his surgery. He will resume afterwards. We will continue his anticoagulation until around  9/12/23. At that time we will repeat an RLE US (ordered)    -8/1/23: Right foot-removal of symptomatic hardware, ORIF subtalar joint with bone graft    -9/12/23: Right lower distal popliteal vein DVT-unclear if acute vs. Chronic. D-dimer was normal following day at 0.31    -12/27/23: RLE Doppler: Partially occlusive DVT in the proximal popliteal vein and occlusive DVT in the distal popliteal vein. Possible propagation noted. Dimer on 12/30/23 is normal at 0.28.     -4/18/24: RLE Doppler-chronic non-occlusive popliteal vein DVT.     Interval History:   -repeat doppler reviewed   -HE has been having bruising with Eliquis and would like to come off  -Has occasional RLE cramping that is tolerable     Review of Systems: 12 Point ROS was completed and pertinent positives are in the HPI    Current Outpatient Medications on File Prior to Visit   Medication Sig Dispense Refill    apixaban 5 MG Oral Tab Take 1 tablet (5 mg total) by mouth 2 (two) times daily. 60 tablet 3    HYDROcodone-acetaminophen  MG Oral Tab TAKE 1 TABLET BY MOUTH NO MORE THAN EVERY 6 HOURS FOR BREAKTHROUGH PAIN      ALPRAZolam 1 MG Oral Tab Take 1 tablet (1 mg total) by mouth 3 (three) times daily as needed.      amphetamine-dextroamphetamine 10 MG Oral Tab Take 1 tablet (10 mg total) by mouth 3 (three) times daily.       No current facility-administered medications on file prior to visit.     Past Medical History:    Anxiety    Attention deficit disorder with hyperactivity(314.01)    Depressive disorder, not elsewhere classified    Essential hypertension    Lumbosacral radiculopathy    R    Obstructive sleep apnea (adult) (pediatric)    Doesn't want mouth or cpap     Panic disorder     Rhabdomyolysis    Labs 6.2015    Right low back pain    Sciatica     Past Surgical History:   Procedure Laterality Date    Hand/finger surgery unlisted      pin placed for fracture    Knee surgery  06/21/2022    KNEE REVISION SUBTALAR FUSION    Other Right 2020    fusion of right subtalar joint    Other surgical history      Tooth extraction    Other surgical history Bilateral 8/24/2015    Procedure: TRANSFORAMINAL LUMBAR EPIDURAL STEROID INJECTION MULTIPLE LEVEL AND BILATERAL;  Surgeon: Dominguez Prasad MD;  Location:  MAIN OR    Other surgical history Bilateral 9/1/2015    Procedure: TRANSFORAMINAL LUMBAR EPIDURAL STEROID INJECTION MULTIPLE LEVEL AND BILATERAL;  Surgeon: Dominguez Prasad MD;  Location: [facility]    Other surgical history Bilateral 9/8/2015    Procedure: TRANSFORAMINAL LUMBAR EPIDURAL STEROID INJECTION MULTIPLE LEVEL AND BILATERAL;  Surgeon: Dominguez Prasad MD;  Location: [facility]    Other surgical history Right 11/3/2015    Procedure: LUMBAR FACET INJECTION;  Surgeon: Dominguez Prasad MD;  Location:  MAIN OR    Other surgical history Right 12/17/2015    Procedure: RADIOFREQUENCY ABLATION OF THORACIC OR LUMBAR MEDIAL BRANCH;  Surgeon: Dominguez Prasad MD;  Location: [facility]    Other surgical history Left 12/28/2015    Procedure: LUMBAR FACET INJECTION;  Surgeon: Dominguez Prasad MD;  Location:  MAIN OR    Other surgical history Left 1/19/2016    Procedure: RADIOFREQUENCY ABLATION OF THORACIC OR LUMBAR MEDIAL BRANCH;  Surgeon: Dominguez Prasad MD;  Location: [facility]    Other surgical history Right 3/22/2016    Procedure: ILIOINGUINAL NERVE BLOCK;  Surgeon: Dominguez Prasad MD;  Location:  MAIN OR    Removal of talus Right 11/2019     Social History     Socioeconomic History    Marital status:    Tobacco Use    Smoking status: Former     Current packs/day: 0.00     Types: Cigarettes     Quit date: 10/3/2013     Years since quitting: 10.5    Smokeless tobacco: Never    Vaping Use    Vaping status: Never Used   Substance and Sexual Activity    Alcohol use: Not Currently     Alcohol/week: 0.0 standard drinks of alcohol     Comment: rare    Drug use: Yes     Frequency: 7.0 times per week     Types: Cannabis      Family History   Problem Relation Age of Onset    Cancer Maternal Aunt         Breast    Hypertension Father        Physical Exam  Height: --  Weight: 103.6 kg (228 lb 6.4 oz) (04/23 0913)  BSA (Calculated - sq m): --  Pulse: 84 (04/23 0913)  BP: 125/82 (04/23 0913)  Temp: 97.3 °F (36.3 °C) (04/23 0913)  Do Not Use - Resp Rate: --  SpO2: 98 % (04/23 0913)     General: NAD, AOX3  HEENT: NCAT, POOL, mmm  CV: rr  Lung: no increased wob  Neuro: CN II-XII grossly intact     Results:  Lab Results   Component Value Date    WBC 6.4 07/11/2023    HGB 15.5 07/11/2023    HCT 45.8 07/11/2023    MCV 86.9 07/11/2023    .0 07/11/2023     Lab Results   Component Value Date     07/11/2023    K 3.9 07/11/2023    CO2 24.0 07/11/2023     07/11/2023    BUN 13 07/11/2023    ALB 3.9 07/11/2023       Radiology: reviewed   04/21/21: RLE doppler  1. Deep vein thrombus within the right popliteal vein which may be chronic with given history of popliteal vein DVT 3 months ago.  Prior studies are not available for comparison   2. Findings communicated to clinical team by radiology staff on 4/21/2021.       07/28/21: RLE Doppler  1. Deep vein thrombus within the right popliteal vein which may be chronic with given history of popliteal vein DVT 3 months ago.  Prior studies are not available for comparison   2. Findings communicated to clinical team by radiology staff on 4/21/2021.       Pathology: n/a    Assessment and Plan:  RLE DVT in right popliteal vein:   -Initially Diagnosed on 1/21/2021.    He has a history of RLE R Popliteal DVT which was diagnosed on 01/21/21. I suspect that this is provoked in the setting of a right ankle surgery and prolonged limited mobility.  His surgery  was on July 28, 2020 and his initial Doppler on August 27, 2020 was negative.  While the duration between surgery and DVT slightly longer than normal, it is highly suspicious that his DVT was provoked and occurred sometime in between September 2020 and January 2021 due to functional immobility.  However, given the time lapse, we are considering this a \"Semi provoked\" DVT.  Given the prolonged period between his surgery and DVT diagnosis, a repeat doppler on 4/21/21 (3 months later) showed a chronic appear R Popliteal DVT along with a negative D-dimer. He had another RLE US done on 7/28/21 which showed a stable occlusive R Popliteal thrombus. We discussed that Residual Vein obstruction is not necessarily considered a risk for future DVT. WE discussed that if his VTE was provoked by surgery, his risk for future DVT is ~3-5% in 5 years. If his DVT was unprovoked, he has a ~30% risk at 5 years. He stopped his Eliquis in 07/2021 and serial D-dimers were negative until 09/2021.    In June 2023, he presented to [facility] and was noted to have a \"new\" DVT however I suspect this was a chronic DVT. He was restarted on Eliquis.     His follow up dopplers have shown a persistent RLE popliteal vein DVT but serial d-dimers have been normal, Most recent doppler from 04/2024 with chronic DVT.     He would like to come off of eliquis. We have a hypercoagulable work up in process given his young age. If he comes of Eliquis, we can trend serial d-dimers. We discussed prophylactic anticoagulation as well and he would like to hold off.     Thoracic aortic aneurysm: 4.3 cm.    - Following with Dr. Rogers at [facility].      [facility]

## 2024-04-23 ENCOUNTER — OFFICE VISIT (OUTPATIENT)
Dept: HEMATOLOGY/ONCOLOGY | Facility: HOSPITAL | Age: 39
End: 2024-04-23
Attending: INTERNAL MEDICINE
Payer: COMMERCIAL

## 2024-04-23 VITALS
HEART RATE: 84 BPM | SYSTOLIC BLOOD PRESSURE: 125 MMHG | TEMPERATURE: 97 F | RESPIRATION RATE: 18 BRPM | DIASTOLIC BLOOD PRESSURE: 82 MMHG | OXYGEN SATURATION: 98 % | BODY MASS INDEX: 34 KG/M2 | WEIGHT: 228.38 LBS

## 2024-04-23 DIAGNOSIS — Z86.718 HISTORY OF DVT (DEEP VEIN THROMBOSIS): ICD-10-CM

## 2024-04-23 LAB — D DIMER PPP FEU-MCNC: <0.27 UG/ML FEU (ref ?–0.5)

## 2024-04-23 PROCEDURE — 99215 OFFICE O/P EST HI 40 MIN: CPT | Performed by: INTERNAL MEDICINE

## 2024-04-23 NOTE — PROGRESS NOTES
Patient here for follow-up. Continues on Eliquis 5 mg BID. States he is having easy bruising and prolonged bleeding problems. Would like to discuss coming off medication.

## 2024-04-24 LAB
ANTITHROMBIN: 96 %
F2 C.20210G>A GENO BLD/T: NORMAL
F5 P.R506Q BLD/T QL: NORMAL
PROTEIN C FUNCTIONAL: 109 %
PROTEIN S FUNCTION: 95 %

## 2024-04-25 LAB
B2 GLYCOPROT1 IGG SERPL IA-ACNC: <0.8 U/ML (ref ?–7)
B2 GLYCOPROT1 IGM SERPL IA-ACNC: <2.4 U/ML (ref ?–7)
CARDIOLIPIN IGG SERPL-ACNC: 1.4 GPL (ref ?–10)
CARDIOLIPIN IGM SERPL-ACNC: 1.6 MPL (ref ?–10)

## 2024-04-26 LAB
APTT PPP: 30.7 SECONDS (ref 23.3–35.6)
INR BLD: 0.97 (ref 0.85–1.16)
LA 3 SCREEN W REFLEX-IMP: NEGATIVE
PROTHROMBIN TIME: 12.9 SECONDS (ref 11.6–14.8)
SCREEN DRVVT: 1.25 S (ref 0–1.29)
SCREEN DRVVT: NEGATIVE S
STACLOT LA DELTA: 5.6 SECONDS (ref ?–8)

## 2024-05-02 ENCOUNTER — HOSPITAL ENCOUNTER (OUTPATIENT)
Age: 39
Discharge: OTHER INSTITUTION | End: 2024-05-02
Payer: COMMERCIAL

## 2024-05-02 VITALS
DIASTOLIC BLOOD PRESSURE: 97 MMHG | SYSTOLIC BLOOD PRESSURE: 138 MMHG | TEMPERATURE: 98 F | OXYGEN SATURATION: 97 % | HEIGHT: 69 IN | WEIGHT: 220 LBS | HEART RATE: 81 BPM | RESPIRATION RATE: 16 BRPM | BODY MASS INDEX: 32.58 KG/M2

## 2024-05-02 DIAGNOSIS — R07.9 CHEST PAIN OF UNCERTAIN ETIOLOGY: ICD-10-CM

## 2024-05-02 DIAGNOSIS — M79.604 PAIN OF RIGHT LOWER EXTREMITY: Primary | ICD-10-CM

## 2024-05-02 PROCEDURE — 93000 ELECTROCARDIOGRAM COMPLETE: CPT | Performed by: NURSE PRACTITIONER

## 2024-05-02 PROCEDURE — 99215 OFFICE O/P EST HI 40 MIN: CPT | Performed by: NURSE PRACTITIONER

## 2024-05-02 RX ORDER — HYDROCODONE BITARTRATE AND ACETAMINOPHEN 5; 325 MG/1; MG/1
1 TABLET ORAL EVERY 6 HOURS PRN
COMMUNITY

## 2024-05-02 RX ORDER — PAROXETINE HYDROCHLORIDE 20 MG/1
20 TABLET, FILM COATED ORAL EVERY MORNING
COMMUNITY

## 2024-05-02 NOTE — ED PROVIDER NOTES
Patient Seen in: [facility]      History     Chief Complaint   Patient presents with    Pain     Stated Complaint: Right leg blood clot    Subjective:   HPI  38-year-old with anxiety, ADHD, depression, hypertension, rhabdomyolysis, and DVT who stopped his Eliquis a week ago due to not liking the way the side effects made him feel presents complaining of right calf pain and feeling a cold feeling when breathing in his chest.  He denies any shortness of breath.  He denies any cough or congestion.      Objective:   Past Medical History:    Anxiety    Attention deficit disorder with hyperactivity(314.01)    Depressive disorder, not elsewhere classified    Essential hypertension    Lumbosacral radiculopathy    R    Obstructive sleep apnea (adult) (pediatric)    Doesn't want mouth or cpap     Panic disorder    Rhabdomyolysis    Labs 6.2015    Right low back pain    Sciatica              Past Surgical History:   Procedure Laterality Date    Hand/finger surgery unlisted      pin placed for fracture    Knee surgery  06/21/2022    KNEE REVISION SUBTALAR FUSION    Other Right 2020    fusion of right subtalar joint    Other surgical history      Tooth extraction    Other surgical history Bilateral 8/24/2015    Procedure: TRANSFORAMINAL LUMBAR EPIDURAL STEROID INJECTION MULTIPLE LEVEL AND BILATERAL;  Surgeon: Dominguez Prasad MD;  Location:  MAIN OR    Other surgical history Bilateral 9/1/2015    Procedure: TRANSFORAMINAL LUMBAR EPIDURAL STEROID INJECTION MULTIPLE LEVEL AND BILATERAL;  Surgeon: Dominguez Prasad MD;  Location:  MAIN OR    Other surgical history Bilateral 9/8/2015    Procedure: TRANSFORAMINAL LUMBAR EPIDURAL STEROID INJECTION MULTIPLE LEVEL AND BILATERAL;  Surgeon: Dominguez Prasad MD;  Location:  MAIN OR    Other surgical history Right 11/3/2015    Procedure: LUMBAR FACET INJECTION;  Surgeon: Dominguez Prasad MD;  Location:  MAIN OR    Other surgical history Right 12/17/2015    Procedure:  RADIOFREQUENCY ABLATION OF THORACIC OR LUMBAR MEDIAL BRANCH;  Surgeon: Dominguez Prasad MD;  Location:  MAIN OR    Other surgical history Left 12/28/2015    Procedure: LUMBAR FACET INJECTION;  Surgeon: Dominguez Prasad MD;  Location:  MAIN OR    Other surgical history Left 1/19/2016    Procedure: RADIOFREQUENCY ABLATION OF THORACIC OR LUMBAR MEDIAL BRANCH;  Surgeon: Dominguez Prasad MD;  Location: [facility]    Other surgical history Right 3/22/2016    Procedure: ILIOINGUINAL NERVE BLOCK;  Surgeon: Dominguez Prasad MD;  Location:  MAIN OR    Removal of talus Right 11/2019                Social History     Socioeconomic History    Marital status:    Tobacco Use    Smoking status: Former     Current packs/day: 0.00     Types: Cigarettes     Quit date: 10/3/2013     Years since quitting: 10.5    Smokeless tobacco: Never   Vaping Use    Vaping status: Never Used   Substance and Sexual Activity    Alcohol use: Not Currently     Alcohol/week: 0.0 standard drinks of alcohol     Comment: rare    Drug use: Yes     Frequency: 7.0 times per week     Types: Cannabis   Other Topics Concern    Caffeine Concern Yes     Comment: 1 can of soda weekly     Exercise No    Seat Belt Yes     Social Determinants of Health     Financial Resource Strain: Not At Risk (7/29/2022)    [facility], [facility]    Financial Resource Strain     How hard is it for you to pay for the very basics like food, housing, medical care, and heating?: Not hard at all   Food Insecurity: No Food Insecurity (7/29/2022)    Received from [facility], [facility]    Food Insecurity     Currently or in the past 3 months, have you worried your food would run out before you had money to buy more?: No     In the past 12 months, have you run out of food or been unable to get more?: No   Transportation Needs: No Transportation Needs (7/29/2022)    Received from  [facility], [facility]    Transportation Needs     Medical Transportation Needs?: Patient refused     Daily Living Transportation Needs? [Peds Only] : Patient refused    Received from [facility], [facility]    Social Connections    Received from [facility], [facility]    Housing Stability              Review of Systems   All other systems reviewed and are negative.      Positive for stated complaint: Right leg blood clot  Other systems are as noted in HPI.  Constitutional and vital signs reviewed.      All other systems reviewed and negative except as noted above.    Physical Exam     ED Triage Vitals [05/02/24 1823]   /84   Pulse 94   Resp 16   Temp 98.3 °F (36.8 °C)   Temp src Temporal   SpO2 97 %   O2 Device None (Room air)       Current:BP (!) 138/97   Pulse 81   Temp 98 °F (36.7 °C) (Temporal)   Resp 16   Ht 175.3 cm (5' 9\")   Wt 99.8 kg   SpO2 97%   BMI 32.49 kg/m²         Physical Exam  Vitals and nursing note reviewed.   Constitutional:       Appearance: He is well-developed.   Cardiovascular:      Rate and Rhythm: Normal rate.   Pulmonary:      Effort: Pulmonary effort is normal.   Musculoskeletal:      Comments: Right calf tenderness   Skin:     General: Skin is warm and dry.   Neurological:      Mental Status: He is alert and oriented to person, place, and time.              ED Course   Labs Reviewed - No data to display  EKG    Rate, intervals and axes as noted on EKG Report.  Rate: 74  Rhythm: Sinus Rhythm  Reading: No acute ST changes                          MDM     Medical Decision Making  38-year-old with anxiety, ADHD, depression, hypertension, rhabdomyolysis, and DVT who stopped his Eliquis a week ago due to not liking the way the side effects made him feel presents complaining of right calf pain and feeling a cold feeling when breathing in his chest.  He denies any  shortness of breath.  He denies any cough or congestion.    We do not have ultrasound or CTA at this location.  Due to patient's known history of DVT he will be sent to the emergency room for evaluation.  Ambulance declined.  Case was discussed with my attending who agreed with transfer.  EKG with no acute ST elevation.        Disposition and Plan     Clinical Impression:  1. Pain of right lower extremity    2. Chest pain of uncertain etiology         Disposition:  Ic to ed  5/2/2024  6:30 pm    Follow-up:  [facility]  [address] [facility] [address] [phone]  Go to       [address] [address]  Go to             Medications Prescribed:  Current Discharge Medication List

## 2024-05-02 NOTE — ED INITIAL ASSESSMENT (HPI)
Pt recently stopped Eliquis d/t Pt not liking side effects.    Pt c/o chest pain \"It feels like I'm breathing in cold air\" and calf/knee in right leg.    Pt has a clot in his popiteal vein.

## 2024-05-03 LAB
ATRIAL RATE: 74 BPM
P AXIS: 15 DEGREES
P-R INTERVAL: 146 MS
Q-T INTERVAL: 368 MS
QRS DURATION: 94 MS
QTC CALCULATION (BEZET): 408 MS
R AXIS: 29 DEGREES
T AXIS: 46 DEGREES
VENTRICULAR RATE: 74 BPM

## 2024-05-08 ENCOUNTER — TELEPHONE (OUTPATIENT)
Dept: ORTHOPEDICS | Age: 39
End: 2024-05-08

## 2024-05-08 ENCOUNTER — APPOINTMENT (OUTPATIENT)
Dept: PODIATRY | Age: 39
End: 2024-05-08

## 2024-05-08 DIAGNOSIS — Z98.890 STATUS POST HARDWARE REMOVAL: Primary | ICD-10-CM

## 2024-05-08 DIAGNOSIS — T85.848D PAIN DUE TO ANY DEVICE, IMPLANT OR GRAFT, SUBSEQUENT ENCOUNTER: ICD-10-CM

## 2024-05-08 DIAGNOSIS — M65.9 SYNOVITIS OF RIGHT ANKLE: ICD-10-CM

## 2024-05-08 DIAGNOSIS — M25.371 ANKLE INSTABILITY, RIGHT: ICD-10-CM

## 2024-05-08 DIAGNOSIS — M96.89 DELAYED UNION AFTER OSTEOTOMY: ICD-10-CM

## 2024-05-08 PROCEDURE — 99214 OFFICE O/P EST MOD 30 MIN: CPT | Performed by: PODIATRIST

## 2024-05-08 RX ORDER — ACETAMINOPHEN 500 MG
1 TABLET ORAL EVERY 8 HOURS
COMMUNITY

## 2024-05-09 ENCOUNTER — E-ADVICE (OUTPATIENT)
Dept: PODIATRY | Age: 39
End: 2024-05-09

## 2024-05-10 ENCOUNTER — E-ADVICE (OUTPATIENT)
Dept: PODIATRY | Age: 39
End: 2024-05-10

## 2024-05-22 ENCOUNTER — HOSPITAL ENCOUNTER (OUTPATIENT)
Age: 39
Discharge: HOME | End: 2024-05-22

## 2024-05-22 VITALS
DIASTOLIC BLOOD PRESSURE: 88 MMHG | HEART RATE: 88 BPM | BODY MASS INDEX: 32 KG/M2 | RESPIRATION RATE: 18 BRPM | OXYGEN SATURATION: 96 % | SYSTOLIC BLOOD PRESSURE: 129 MMHG | WEIGHT: 220 LBS | TEMPERATURE: 98 F

## 2024-05-22 DIAGNOSIS — H61.23 BILATERAL IMPACTED CERUMEN: Primary | ICD-10-CM

## 2024-05-22 PROCEDURE — 99213 OFFICE O/P EST LOW 20 MIN: CPT | Performed by: PHYSICIAN ASSISTANT

## 2024-05-22 NOTE — ED INITIAL ASSESSMENT (HPI)
Pt with c/o bilateral ear pain for the past few days.  Pt just returned from [facility]

## 2024-05-22 NOTE — ED PROVIDER NOTES
Patient Seen in: [facility]      History     Chief Complaint   Patient presents with    Ear Problem Pain     Stated Complaint: Ear pain    Subjective:   HPI    38 YO male presents to immediate care for evaluation of clogged sensation at bilateral ears starting a few days ago. Denies any other physical complaints at this time.         Objective:   Past Medical History:    Anxiety    Attention deficit disorder with hyperactivity(314.01)    Depressive disorder, not elsewhere classified    Essential hypertension    Lumbosacral radiculopathy    R    Obstructive sleep apnea (adult) (pediatric)    Doesn't want mouth or cpap     Panic disorder    Rhabdomyolysis    Labs 6.2015    Right low back pain    Sciatica              Past Surgical History:   Procedure Laterality Date    Hand/finger surgery unlisted      pin placed for fracture    Knee surgery  06/21/2022    KNEE REVISION SUBTALAR FUSION    Other Right 2020    fusion of right subtalar joint    Other surgical history      Tooth extraction    Other surgical history Bilateral 8/24/2015    Procedure: TRANSFORAMINAL LUMBAR EPIDURAL STEROID INJECTION MULTIPLE LEVEL AND BILATERAL;  Surgeon: Dominguez Prasad MD;  Location:  MAIN OR    Other surgical history Bilateral 9/1/2015    Procedure: TRANSFORAMINAL LUMBAR EPIDURAL STEROID INJECTION MULTIPLE LEVEL AND BILATERAL;  Surgeon: Dominguez Prasad MD;  Location:  MAIN OR    Other surgical history Bilateral 9/8/2015    Procedure: TRANSFORAMINAL LUMBAR EPIDURAL STEROID INJECTION MULTIPLE LEVEL AND BILATERAL;  Surgeon: Dominguez Prasad MD;  Location:  MAIN OR    Other surgical history Right 11/3/2015    Procedure: LUMBAR FACET INJECTION;  Surgeon: Dominguez Prasad MD;  Location:  MAIN OR    Other surgical history Right 12/17/2015    Procedure: RADIOFREQUENCY ABLATION OF THORACIC OR LUMBAR MEDIAL BRANCH;  Surgeon: Dominguez Prasad MD;  Location:  MAIN OR    Other surgical history Left 12/28/2015     Procedure: LUMBAR FACET INJECTION;  Surgeon: Dominguez Prasad MD;  Location:  MAIN OR    Other surgical history Left 1/19/2016    Procedure: RADIOFREQUENCY ABLATION OF THORACIC OR LUMBAR MEDIAL BRANCH;  Surgeon: Dominguez Prasad MD;  Location:  MAIN OR    Other surgical history Right 3/22/2016    Procedure: ILIOINGUINAL NERVE BLOCK;  Surgeon: Dominguez Prasad MD;  Location: [facility]    Removal of talus Right 11/2019                Social History     Socioeconomic History    Marital status:    Tobacco Use    Smoking status: Former     Current packs/day: 0.00     Types: Cigarettes     Quit date: 10/3/2013     Years since quitting: 10.6    Smokeless tobacco: Never   Vaping Use    Vaping status: Never Used   Substance and Sexual Activity    Alcohol use: Not Currently     Alcohol/week: 0.0 standard drinks of alcohol     Comment: rare    Drug use: Yes     Frequency: 7.0 times per week     Types: Cannabis   Other Topics Concern    Caffeine Concern Yes     Comment: 1 can of soda weekly     Exercise No    Seat Belt Yes     Social Determinants of Health     Financial Resource Strain: Not At Risk (7/29/2022)    Received from [facility], [facility]    Financial Resource Strain     How hard is it for you to pay for the very basics like food, housing, medical care, and heating?: Not hard at all   Food Insecurity: No Food Insecurity (7/29/2022)    Received from [facility], [facility]    Food Insecurity     Currently or in the past 3 months, have you worried your food would run out before you had money to buy more?: No     In the past 12 months, have you run out of food or been unable to get more?: No   Transportation Needs: No Transportation Needs (7/29/2022)    Received from [facility], [facility]    Transportation Needs     Medical Transportation Needs?: Patient refused     Daily Living  Transportation Needs? [Peds Only] : Patient refused    Received from [facility], [facility]    Social Connections    Received from [facility], [facility]    Housing Stability              Review of Systems    Positive for stated complaint: Ear pain  Other systems are as noted in HPI.  Constitutional and vital signs reviewed.      All other systems reviewed and negative except as noted above.    Physical Exam     ED Triage Vitals [05/22/24 1757]   /88   Pulse 88   Resp 18   Temp 98.1 °F (36.7 °C)   Temp src Temporal   SpO2 96 %   O2 Device None (Room air)       Current Vitals:   Vital Signs  BP: 129/88  Pulse: 88  Resp: 18  Temp: 98.1 °F (36.7 °C)  Temp src: Temporal    Oxygen Therapy  SpO2: 96 %  O2 Device: None (Room air)        Physical Exam  Vitals and nursing note reviewed.   Constitutional:       General: He is not in acute distress.     Appearance: Normal appearance. He is not ill-appearing, toxic-appearing or diaphoretic.   HENT:      Right Ear: There is impacted cerumen.      Left Ear: There is impacted cerumen.   Pulmonary:      Effort: Pulmonary effort is normal. No respiratory distress.   Neurological:      Mental Status: He is alert and oriented to person, place, and time.   Psychiatric:         Mood and Affect: Mood normal.         Behavior: Behavior normal.         ED Course   Labs Reviewed - No data to display      MDM      Differential diagnosis considered but not limited to otitis media with effusion, acute otitis media, otitis externa, mastoiditis    Physical exam is consistent with bilateral cerumen impaction.  Subjective improvement post ear irrigation and repeat ear exam is normal.   Home care discussed.       Medical Decision Making      Disposition and Plan     Clinical Impression:  1. Bilateral impacted cerumen         Disposition:  Discharge  5/22/2024  6:35 pm    Follow-up:  [facility]  [address] [address]  [phone]    If symptoms worsen          Medications Prescribed:  Discharge Medication List as of 5/22/2024  6:36 PM

## 2024-05-23 ENCOUNTER — EXTERNAL RECORD (OUTPATIENT)
Dept: HEALTH INFORMATION MANAGEMENT | Facility: OTHER | Age: 39
End: 2024-05-23

## 2024-05-31 ENCOUNTER — EXTERNAL RECORD (OUTPATIENT)
Dept: HEALTH INFORMATION MANAGEMENT | Facility: OTHER | Age: 39
End: 2024-05-31

## 2024-06-03 ENCOUNTER — E-ADVICE (OUTPATIENT)
Dept: PODIATRY | Age: 39
End: 2024-06-03

## 2024-06-03 ENCOUNTER — EXTERNAL RECORD (OUTPATIENT)
Dept: HEALTH INFORMATION MANAGEMENT | Facility: OTHER | Age: 39
End: 2024-06-03

## 2024-06-12 ENCOUNTER — HOSPITAL ENCOUNTER (OUTPATIENT)
Age: 39
Discharge: HOME | End: 2024-06-12
Payer: OTHER MISCELLANEOUS

## 2024-06-12 VITALS
HEART RATE: 82 BPM | OXYGEN SATURATION: 97 % | HEIGHT: 69 IN | BODY MASS INDEX: 33.33 KG/M2 | SYSTOLIC BLOOD PRESSURE: 119 MMHG | RESPIRATION RATE: 16 BRPM | DIASTOLIC BLOOD PRESSURE: 84 MMHG | TEMPERATURE: 98 F | WEIGHT: 225 LBS

## 2024-06-12 DIAGNOSIS — M67.40 GANGLION CYST: Primary | ICD-10-CM

## 2024-06-12 PROCEDURE — 99213 OFFICE O/P EST LOW 20 MIN: CPT | Performed by: NURSE PRACTITIONER

## 2024-06-12 NOTE — DISCHARGE INSTRUCTIONS
Follow-up with your primary care provider for all of your healthcare needs  Warm compress to the Achilles area can be helpful  Continue your Norco for pain  Return to the emergency room for symptoms or concerns

## 2024-06-12 NOTE — ED PROVIDER NOTES
Patient Seen in: [facility]      History     Chief Complaint   Patient presents with    Lump Mass     Stated Complaint: w/c ankle injury    Subjective:   HPI    39-year-old male presents to the immediate care with complaints of a lump to the back of the Achilles on the right side.  Patient states that he noticed this lump after he was at his orthopedic earlier today.  Orthopedic thought it was just some type of cyst.  Patient is just tender to touch.  Patient has no other issues complaints or concerns.  The patient's medication list, past medical history and social history elements as listed in today's nurse's notes were reviewed and agreed (except as otherwise stated in the HPI).  The patient's family history reviewed and determined to be noncontributory to the presenting problem.      Objective:   Past Medical History:    Anxiety    Attention deficit disorder with hyperactivity(314.01)    Depressive disorder, not elsewhere classified    Essential hypertension    Lumbosacral radiculopathy    R    Obstructive sleep apnea (adult) (pediatric)    Doesn't want mouth or cpap     Panic disorder    Rhabdomyolysis    Labs 6.2015    Right low back pain    Sciatica              Past Surgical History:   Procedure Laterality Date    Hand/finger surgery unlisted      pin placed for fracture    Knee surgery  06/21/2022    KNEE REVISION SUBTALAR FUSION    Other Right 2020    fusion of right subtalar joint    Other surgical history      Tooth extraction    Other surgical history Bilateral 8/24/2015    Procedure: TRANSFORAMINAL LUMBAR EPIDURAL STEROID INJECTION MULTIPLE LEVEL AND BILATERAL;  Surgeon: Dominguez Prasad MD;  Location:  MAIN OR    Other surgical history Bilateral 9/1/2015    Procedure: TRANSFORAMINAL LUMBAR EPIDURAL STEROID INJECTION MULTIPLE LEVEL AND BILATERAL;  Surgeon: Dominguez Prasad MD;  Location:  MAIN OR    Other surgical history Bilateral 9/8/2015    Procedure: TRANSFORAMINAL LUMBAR EPIDURAL  STEROID INJECTION MULTIPLE LEVEL AND BILATERAL;  Surgeon: Dominguez Prasad MD;  Location:  MAIN OR    Other surgical history Right 11/3/2015    Procedure: LUMBAR FACET INJECTION;  Surgeon: Dominguez Prasad MD;  Location: [facility]    Other surgical history Right 12/17/2015    Procedure: RADIOFREQUENCY ABLATION OF THORACIC OR LUMBAR MEDIAL BRANCH;  Surgeon: Dominguez Prasad MD;  Location: [facility]    Other surgical history Left 12/28/2015    Procedure: LUMBAR FACET INJECTION;  Surgeon: Dominguez Prasad MD;  Location: [facility]    Other surgical history Left 1/19/2016    Procedure: RADIOFREQUENCY ABLATION OF THORACIC OR LUMBAR MEDIAL BRANCH;  Surgeon: Dominguez Prasad MD;  Location: [facility]    Other surgical history Right 3/22/2016    Procedure: ILIOINGUINAL NERVE BLOCK;  Surgeon: Dominguez Prasad MD;  Location:  MAIN OR    Removal of talus Right 11/2019                Social History     Socioeconomic History    Marital status:    Tobacco Use    Smoking status: Former     Current packs/day: 0.00     Types: Cigarettes     Quit date: 10/3/2013     Years since quitting: 10.6    Smokeless tobacco: Never   Vaping Use    Vaping status: Never Used   Substance and Sexual Activity    Alcohol use: Not Currently     Alcohol/week: 0.0 standard drinks of alcohol     Comment: rare    Drug use: Yes     Frequency: 7.0 times per week     Types: Cannabis   Other Topics Concern    Caffeine Concern Yes     Comment: 1 can of soda weekly     Exercise No    Seat Belt Yes     Social Determinants of Health     Financial Resource Strain: Not At Risk (7/29/2022)    [facility], [facility]    Financial Resource Strain     How hard is it for you to pay for the very basics like food, housing, medical care, and heating?: Not hard at all   Food Insecurity: No Food Insecurity (7/29/2022)    Received from [facility], [facility]    Food  Insecurity     Currently or in the past 3 months, have you worried your food would run out before you had money to buy more?: No     In the past 12 months, have you run out of food or been unable to get more?: No   Transportation Needs: No Transportation Needs (7/29/2022)    Received from [facility], [facility]    Transportation Needs     Medical Transportation Needs?: Patient refused     Daily Living Transportation Needs? [Peds Only] : Patient refused    Received from [facility], [facility]    Social Connections    Received from [facility], [facility]    Housing Stability              Review of Systems    Positive for stated complaint: w/c ankle injury  Other systems are as noted in HPI.  Constitutional and vital signs reviewed.      All other systems reviewed and negative except as noted above.    Physical Exam     ED Triage Vitals [06/12/24 1331]   /84   Pulse 82   Resp 16   Temp 97.7 °F (36.5 °C)   Temp src Temporal   SpO2 97 %   O2 Device None (Room air)       Current Vitals:   Vital Signs  BP: 119/84  Pulse: 82  Resp: 16  Temp: 97.7 °F (36.5 °C)  Temp src: Temporal    Oxygen Therapy  SpO2: 97 %  O2 Device: None (Room air)            Physical Exam    GENERAL: The patient is well-developed well-nourished nontoxic, non-ill-appearing  HEENT: Normocephalic.  Atraumatic.  Extraocular motions are intact  NECK: Supple.  No meningitic signs are noted.   CHEST/LUNGS: Clear to auscultation.  There is no respiratory distress noted.  HEART/CARDIOVASCULAR: Regular.  There is no tachycardia.   SKIN: Small palpable mass is noted to the back of the Achilles.  Nontender, no warmth.  NEURO: The patient is awake, alert, and oriented.  The patient is cooperative.  ED Course   Labs Reviewed - No data to display                   MDM   Pertinent Labs & Imaging studies reviewed. (See chart for  details)  Differential diagnosis considered but not limited to: Ganglion cyst, abscess, ruptured Achilles, sebaceous cyst  Patient coming in with pain to the back of the Achilles. . Will treat for possible ganglion cyst of the Achilles. Will discharge on over-the-counter supportive care see discharge note for instructions. Patient is comfortable with this plan.  Overall Pt looks good. Non-toxic, well-hydrated and in no respiratory distress. Vital signs are reassuring. Exam is reassuring. I do not believe pt  requires and additional  diagnostic studiesor intervention. I believe pt  can be discharged home to continue evaluation as an outpatient. Follow-up provider given. Discharge instructions given and reviewed. Return for any problems. All understand and agreewith the plan.    Please note that this report has been produced using speech recognition software and may contain errors related to that system including, but not limited to, errors in grammar, punctuation, and spelling, as well as words and phrases that possibly may have been recognized inappropriately.  If there are any questions or concerns, contact the dictating provider for clarification.    Note to patient: The 21st Century Cures Act makes medical notes like these available to patients in the interest of transparency. However, this is a medical document intended as peer to peer communication. It is written in medical language and may contain abbreviations or verbiage that are unfamiliar. It may appear blunt or direct. Medical documents are intended to carry relevant information, facts as evident, and the clinical opinion of the practitioner.                                Medical Decision Making      Disposition and Plan     Clinical Impression:  1. Ganglion cyst         Disposition:  Discharge  6/12/2024  1:42 pm    Follow-up:  Keivn Nvaa MD  [address] [address]  [phone]                Medications  Prescribed:  Discharge Medication List as of 6/12/2024  1:43 PM

## 2024-06-19 ENCOUNTER — MED REC SCAN ONLY (OUTPATIENT)
Dept: FAMILY MEDICINE CLINIC | Facility: CLINIC | Age: 39
End: 2024-06-19

## 2024-06-27 ENCOUNTER — APPOINTMENT (OUTPATIENT)
Dept: PODIATRY | Age: 39
End: 2024-06-27

## 2024-07-22 ENCOUNTER — E-ADVICE (OUTPATIENT)
Dept: PODIATRY | Age: 39
End: 2024-07-22

## 2024-07-22 DIAGNOSIS — Z98.890 STATUS POST HARDWARE REMOVAL: Primary | ICD-10-CM

## 2024-07-23 ENCOUNTER — TELEPHONE (OUTPATIENT)
Dept: ORTHOPEDICS | Age: 39
End: 2024-07-23

## 2024-07-25 ENCOUNTER — E-ADVICE (OUTPATIENT)
Dept: PODIATRY | Age: 39
End: 2024-07-25

## 2024-08-14 ENCOUNTER — APPOINTMENT (OUTPATIENT)
Dept: PODIATRY | Age: 39
End: 2024-08-14

## 2024-08-15 ENCOUNTER — APPOINTMENT (OUTPATIENT)
Dept: PODIATRY | Age: 39
End: 2024-08-15

## 2024-08-15 DIAGNOSIS — Z98.890 STATUS POST HARDWARE REMOVAL: Primary | ICD-10-CM

## 2024-08-15 DIAGNOSIS — M65.9 SYNOVITIS OF RIGHT ANKLE: ICD-10-CM

## 2024-08-15 DIAGNOSIS — M19.071 DJD (DEGENERATIVE JOINT DISEASE), ANKLE AND FOOT, RIGHT: ICD-10-CM

## 2024-08-15 DIAGNOSIS — T85.848D PAIN DUE TO ANY DEVICE, IMPLANT OR GRAFT, SUBSEQUENT ENCOUNTER: ICD-10-CM

## 2024-08-15 DIAGNOSIS — M25.371 ANKLE INSTABILITY, RIGHT: ICD-10-CM

## 2024-08-15 DIAGNOSIS — M96.89 DELAYED UNION AFTER OSTEOTOMY: ICD-10-CM

## 2024-08-15 DIAGNOSIS — M76.821 POSTERIOR TIBIAL TENDONITIS, RIGHT: ICD-10-CM

## 2024-08-15 DIAGNOSIS — M96.0 NONUNION OF SUBTALAR ARTHRODESIS: ICD-10-CM

## 2024-08-15 RX ORDER — DEXTROAMPHETAMINE SACCHARATE, AMPHETAMINE ASPARTATE MONOHYDRATE, DEXTROAMPHETAMINE SULFATE AND AMPHETAMINE SULFATE 5; 5; 5; 5 MG/1; MG/1; MG/1; MG/1
CAPSULE, EXTENDED RELEASE ORAL
COMMUNITY
Start: 2024-07-18

## 2024-08-15 RX ORDER — ONDANSETRON 4 MG/1
TABLET, ORALLY DISINTEGRATING ORAL
COMMUNITY
Start: 2024-06-16

## 2024-08-15 RX ORDER — ZINC SULFATE 50(220)MG
1 CAPSULE ORAL DAILY
COMMUNITY

## 2024-08-15 RX ORDER — ALPRAZOLAM 2 MG
TABLET ORAL
COMMUNITY

## 2024-08-15 RX ORDER — LORAZEPAM 2 MG/1
1 TABLET ORAL 3 TIMES DAILY PRN
COMMUNITY

## 2024-08-15 RX ORDER — DEXAMETHASONE SODIUM PHOSPHATE 4 MG/ML
4 INJECTION, SOLUTION INTRA-ARTICULAR; INTRALESIONAL; INTRAMUSCULAR; INTRAVENOUS; SOFT TISSUE ONCE
Status: COMPLETED | OUTPATIENT
Start: 2024-08-15 | End: 2024-08-15

## 2024-08-15 RX ORDER — PAROXETINE 20 MG/1
20 TABLET, FILM COATED ORAL
COMMUNITY

## 2024-08-15 RX ORDER — TRIAMCINOLONE ACETONIDE 40 MG/ML
10 INJECTION, SUSPENSION INTRA-ARTICULAR; INTRAMUSCULAR ONCE
Status: COMPLETED | OUTPATIENT
Start: 2024-08-15 | End: 2024-08-15

## 2024-08-15 RX ORDER — PAROXETINE 30 MG/1
TABLET, FILM COATED ORAL
COMMUNITY
Start: 2024-07-18

## 2024-08-15 RX ORDER — HYDROCODONE BITARTRATE AND ACETAMINOPHEN 5; 325 MG/1; MG/1
1 TABLET ORAL EVERY 8 HOURS PRN
Qty: 3 TABLET | Refills: 0 | Status: SHIPPED | OUTPATIENT
Start: 2024-08-15

## 2024-08-15 RX ORDER — MORPHINE SULFATE 15 MG/1
TABLET, FILM COATED, EXTENDED RELEASE ORAL
COMMUNITY

## 2024-08-15 RX ORDER — NAPROXEN 500 MG/1
TABLET ORAL
COMMUNITY

## 2024-08-15 RX ORDER — QUETIAPINE FUMARATE 100 MG/1
1 TABLET, FILM COATED ORAL NIGHTLY
COMMUNITY

## 2024-08-15 RX ORDER — DICYCLOMINE HCL 20 MG
TABLET ORAL
COMMUNITY
Start: 2024-06-16

## 2024-08-15 RX ORDER — HYDROCODONE BITARTRATE AND ACETAMINOPHEN 7.5; 325 MG/1; MG/1
TABLET ORAL
COMMUNITY

## 2024-08-15 RX ORDER — PAROXETINE 30 MG/1
TABLET, FILM COATED ORAL
COMMUNITY
Start: 2024-08-13

## 2024-08-15 RX ORDER — IBUPROFEN 600 MG/1
1 TABLET, FILM COATED ORAL EVERY 8 HOURS
COMMUNITY

## 2024-08-15 RX ORDER — RIFAMPIN 300 MG/1
1 CAPSULE ORAL EVERY 12 HOURS
COMMUNITY

## 2024-08-15 RX ORDER — HYDROCODONE BITARTRATE AND ACETAMINOPHEN 10; 325 MG/1; MG/1
TABLET ORAL
COMMUNITY

## 2024-08-15 RX ADMIN — DEXAMETHASONE SODIUM PHOSPHATE 4 MG: 4 INJECTION, SOLUTION INTRA-ARTICULAR; INTRALESIONAL; INTRAMUSCULAR; INTRAVENOUS; SOFT TISSUE at 11:02

## 2024-08-15 RX ADMIN — TRIAMCINOLONE ACETONIDE 10 MG: 40 INJECTION, SUSPENSION INTRA-ARTICULAR; INTRAMUSCULAR at 11:02

## 2024-08-16 ENCOUNTER — TELEPHONE (OUTPATIENT)
Dept: PODIATRY | Age: 39
End: 2024-08-16

## 2024-09-12 ENCOUNTER — HOSPITAL ENCOUNTER (OUTPATIENT)
Age: 39
Discharge: HOME | End: 2024-09-12
Payer: COMMERCIAL

## 2024-09-12 VITALS
SYSTOLIC BLOOD PRESSURE: 136 MMHG | TEMPERATURE: 98 F | HEIGHT: 69 IN | DIASTOLIC BLOOD PRESSURE: 87 MMHG | BODY MASS INDEX: 36.29 KG/M2 | HEART RATE: 69 BPM | WEIGHT: 245 LBS | OXYGEN SATURATION: 95 % | RESPIRATION RATE: 16 BRPM

## 2024-09-12 DIAGNOSIS — H00.011 HORDEOLUM EXTERNUM OF RIGHT UPPER EYELID: Primary | ICD-10-CM

## 2024-09-12 PROCEDURE — 99213 OFFICE O/P EST LOW 20 MIN: CPT | Performed by: NURSE PRACTITIONER

## 2024-09-12 RX ORDER — LISDEXAMFETAMINE DIMESYLATE 20 MG/1
20 CAPSULE ORAL EVERY MORNING
COMMUNITY

## 2024-09-12 RX ORDER — CEPHALEXIN 500 MG/1
500 CAPSULE ORAL 3 TIMES DAILY
Qty: 30 CAPSULE | Refills: 0 | Status: SHIPPED | OUTPATIENT
Start: 2024-09-12 | End: 2024-09-22

## 2024-09-12 RX ORDER — ERYTHROMYCIN 5 MG/G
1 OINTMENT OPHTHALMIC EVERY 6 HOURS
Qty: 1 EACH | Refills: 0 | Status: SHIPPED | OUTPATIENT
Start: 2024-09-12 | End: 2024-09-19

## 2024-09-12 NOTE — DISCHARGE INSTRUCTIONS
Detail Level: Detailed Follow-up with your primary care provider for all of your healthcare needs  Follow-up with ophthalmology for further evaluation  Finish the full course of the oral antibiotics  Use the eye ointment as directed by the pharmacy  Continue the warm compress  Return to the emergency room for symptoms or concerns

## 2024-09-12 NOTE — ED PROVIDER NOTES
Patient Seen in: [facility]      History   No chief complaint on file.    Stated Complaint: eye problem    Subjective:   HPI  39-year-old male presents to the [facility] with a stye to the right upper eyelid for 1 month.  Has been trying warm compresses and try to pop himself but no relief of symptoms.  States he does interfere with his vision but denies any pain with eye movement no foreign body sensation.  Patient has no other concerns at this time.  The patient's medication list, past medical history and social history elements as listed in today's nurse's notes were reviewed and agreed (except as otherwise stated in the HPI).  The patient's family history reviewed and determined to be noncontributory to the presenting problem.    Objective:   Past Medical History:    Anxiety    Attention deficit disorder with hyperactivity(314.01)    Depressive disorder, not elsewhere classified    Essential hypertension    History of blood clots    Lumbosacral radiculopathy    R    Obstructive sleep apnea (adult) (pediatric)    Doesn't want mouth or cpap     Panic disorder    Rhabdomyolysis    Labs 6.2015    Right low back pain    Sciatica              Past Surgical History:   Procedure Laterality Date    Hand/finger surgery unlisted      pin placed for fracture    Knee surgery  06/21/2022    KNEE REVISION SUBTALAR FUSION    Other Right 2020    fusion of right subtalar joint    Other surgical history      Tooth extraction    Other surgical history Bilateral 8/24/2015    Procedure: TRANSFORAMINAL LUMBAR EPIDURAL STEROID INJECTION MULTIPLE LEVEL AND BILATERAL;  Surgeon: Dominguez Prasad MD;  Location:  MAIN OR    Other surgical history Bilateral 9/1/2015    Procedure: TRANSFORAMINAL LUMBAR EPIDURAL STEROID INJECTION MULTIPLE LEVEL AND BILATERAL;  Surgeon: Dominguez Prasad MD;  Location:  MAIN OR    Other surgical history Bilateral 9/8/2015    Procedure: TRANSFORAMINAL LUMBAR EPIDURAL STEROID INJECTION MULTIPLE  LEVEL AND BILATERAL;  Surgeon: Dominguez Prasad MD;  Location: [facility]    Other surgical history Right 11/3/2015    Procedure: LUMBAR FACET INJECTION;  Surgeon: Dominguez Prasad MD;  Location: [facility]    Other surgical history Right 12/17/2015    Procedure: RADIOFREQUENCY ABLATION OF THORACIC OR LUMBAR MEDIAL BRANCH;  Surgeon: Dominguez Prasad MD;  Location: [facility]    Other surgical history Left 12/28/2015    Procedure: LUMBAR FACET INJECTION;  Surgeon: Dominguez Prasad MD;  Location: [facility]    Other surgical history Left 1/19/2016    Procedure: RADIOFREQUENCY ABLATION OF THORACIC OR LUMBAR MEDIAL BRANCH;  Surgeon: Dominguez Prasad MD;  Location: [facility]    Other surgical history Right 3/22/2016    Procedure: ILIOINGUINAL NERVE BLOCK;  Surgeon: Dominguez Prasad MD;  Location: [facility]    Removal of talus Right 11/2019                Social History     Socioeconomic History    Marital status:    Tobacco Use    Smoking status: Former     Current packs/day: 0.00     Types: Cigarettes     Quit date: 10/3/2013     Years since quitting: 10.9    Smokeless tobacco: Never   Vaping Use    Vaping status: Never Used   Substance and Sexual Activity    Alcohol use: Not Currently     Alcohol/week: 0.0 standard drinks of alcohol     Comment: rare    Drug use: Yes     Frequency: 7.0 times per week     Types: Cannabis   Other Topics Concern    Caffeine Concern Yes     Comment: 1 can of soda weekly     Exercise No    Seat Belt Yes     Social Determinants of Health     Financial Resource Strain: Not At Risk (7/29/2022)    [facility]    Financial Resource Strain     How hard is it for you to pay for the very basics like food, housing, medical care, and heating?: Not hard at all   Food Insecurity: No Food Insecurity (7/29/2022)    [facility]    Food Insecurity     Currently or  in the past 3 months, have you worried your food would run out before you had money to buy more?: No     In the past 12 months, have you run out of food or been unable to get more?: No   Transportation Needs: No Transportation Needs (7/29/2022)    [facility]    Transportation Needs     Medical Transportation Needs?: Patient refused     Daily Living Transportation Needs? [Peds Only] : Patient refused    Received from [facility], [facility]    Social Connections    Received from [facility], [facility]    Housing Stability              Review of Systems    Positive for stated Chief Complaint: No chief complaint on file.    Other systems are as noted in HPI.  Constitutional and vital signs reviewed.      All other systems reviewed and negative except as noted above.    Physical Exam     ED Triage Vitals [09/12/24 1005]   /87   Pulse 69   Resp 16   Temp 98.2 °F (36.8 °C)   Temp src Temporal   SpO2 95 %   O2 Device None (Room air)       Current Vitals:   Vital Signs  BP: 136/87  Pulse: 69  Resp: 16  Temp: 98.2 °F (36.8 °C)  Temp src: Temporal    Oxygen Therapy  SpO2: 95 %  O2 Device: None (Room air)         ,  (unable to tolerate)  Left Eye Chart Acuity: 20/25, Uncorrected    Physical Exam    GENERAL: The patient is well-developed well-nourished nontoxic, non-ill-appearing  HEENT: Normocephalic.  Atraumatic.  Extraocular motions are intact pea-sized stye is noted to the right upper eyelid with no surrounding area of erythema.  NECK: Supple.  No meningitic signs are noted.   CHEST/LUNGS: Clear to auscultation.  There is no respiratory distress noted.  HEART/CARDIOVASCULAR: Regular.  There is no tachycardia.   SKIN: There is no rash.  NEURO: The patient is awake, alert, and oriented.  The patient is cooperative.    ED Course   Labs Reviewed - No data to display                 MDM   Pertinent Labs & Imaging studies reviewed. (See chart for details)  Differential diagnosis considered but not limited to: Conjunctivitis stye blepharitis  Patient coming in with right upper lid stye.  Stye. Will treat for possible stye. Will discharge on Keflex and erythromycin. Patient is comfortable with this plan.  Overall Pt looks good. Non-toxic, well-hydrated and in no respiratory distress. Vital signs are reassuring. Exam is reassuring. I do not believe pt  requires and additional  diagnostic studiesor intervention. I believe pt  can be discharged home to continue evaluation as an outpatient. Follow-up provider given. Discharge instructions given and reviewed. Return for any problems. All understand and agreewith the plan.    Please note that this report has been produced using speech recognition software and may contain errors related to that system including, but not limited to, errors in grammar, punctuation, and spelling, as well as words and phrases that possibly may have been recognized inappropriately.  If there are any questions or concerns, contact the dictating provider for clarification.    Note to patient: The 21st Century Cures Act makes medical notes like these available to patients in the interest of transparency. However, this is a medical document intended as peer to peer communication. It is written in medical language and may contain abbreviations or verbiage that are unfamiliar. It may appear blunt or direct. Medical documents are intended to carry relevant information, facts as evident, and the clinical opinion of the practitioner.                                      Medical Decision Making      Disposition and Plan     Clinical Impression:  1. Hordeolum externum of right upper eyelid         Disposition:  Discharge  9/12/2024 10:22 am    Follow-up:  Kevin Nava [place]  [address] [address] [address]  [phone]          [place]  [address]  [address]  [facility] [address]  [phone]                Medications Prescribed:  Discharge Medication List as of 9/12/2024 10:23 AM        START taking these medications    Details   cephALEXin 500 MG Oral Cap Take 1 capsule (500 mg total) by mouth 3 (three) times daily for 10 days., Normal, Disp-30 capsule, R-0      erythromycin 5 MG/GM Ophthalmic Ointment Place 1 Application into the right eye every 6 (six) hours for 7 days., Normal, Disp-1 each, R-0

## 2024-09-20 ENCOUNTER — OFFICE VISIT (OUTPATIENT)
Dept: OPTOMETRY | Age: 39
End: 2024-09-20

## 2024-09-20 DIAGNOSIS — H00.11 CHALAZION OF RIGHT UPPER EYELID: Primary | ICD-10-CM

## 2024-09-20 ASSESSMENT — CONF VISUAL FIELD
OD_SUPERIOR_NASAL_RESTRICTION: 0
OD_INFERIOR_NASAL_RESTRICTION: 0
OS_SUPERIOR_TEMPORAL_RESTRICTION: 0
OS_SUPERIOR_NASAL_RESTRICTION: 0
OS_INFERIOR_NASAL_RESTRICTION: 0
OD_NORMAL: 1
OS_NORMAL: 1
OD_INFERIOR_TEMPORAL_RESTRICTION: 0
OS_INFERIOR_TEMPORAL_RESTRICTION: 0
OD_SUPERIOR_TEMPORAL_RESTRICTION: 0

## 2024-09-20 ASSESSMENT — SLIT LAMP EXAM - LIDS
COMMENTS: CHALAZION: UPPER LID
COMMENTS: NORMAL

## 2024-09-20 ASSESSMENT — TONOMETRY
OD_IOP_MMHG: 18
OS_IOP_MMHG: 18

## 2024-09-20 ASSESSMENT — VISUAL ACUITY
OD_SC: 20/20
METHOD: SNELLEN - LINEAR
OS_SC: 20/25

## 2024-09-23 ENCOUNTER — APPOINTMENT (OUTPATIENT)
Dept: OPHTHALMOLOGY | Age: 39
End: 2024-09-23

## 2024-09-23 DIAGNOSIS — H00.11 CHALAZION OF RIGHT UPPER EYELID: Primary | ICD-10-CM

## 2024-09-23 PROCEDURE — 99202 OFFICE O/P NEW SF 15 MIN: CPT | Performed by: OPHTHALMOLOGY

## 2024-09-23 PROCEDURE — 11900 INJECT SKIN LESIONS </W 7: CPT | Performed by: OPHTHALMOLOGY

## 2024-09-23 RX ORDER — TRIAMCINOLONE ACETONIDE 40 MG/ML
40 INJECTION, SUSPENSION INTRA-ARTICULAR; INTRAMUSCULAR ONCE
Status: COMPLETED | OUTPATIENT
Start: 2024-09-23 | End: 2024-09-23

## 2024-09-23 RX ADMIN — TRIAMCINOLONE ACETONIDE 4 MG: 40 INJECTION, SUSPENSION INTRA-ARTICULAR; INTRAMUSCULAR at 08:13

## 2024-09-23 ASSESSMENT — VISUAL ACUITY
OD_SC: 20/30
OS_SC: 20/20
METHOD: SNELLEN - LINEAR

## 2024-09-23 ASSESSMENT — EXTERNAL EXAM - LEFT EYE: OS_EXAM: NORMAL

## 2024-09-23 ASSESSMENT — SLIT LAMP EXAM - LIDS
COMMENTS: NORMAL
COMMENTS: NORMAL

## 2024-10-16 ENCOUNTER — APPOINTMENT (OUTPATIENT)
Dept: PODIATRY | Age: 39
End: 2024-10-16

## 2024-10-16 DIAGNOSIS — M65.971 SYNOVITIS OF RIGHT ANKLE: ICD-10-CM

## 2024-10-16 DIAGNOSIS — Z98.890 STATUS POST RIGHT FOOT SURGERY: ICD-10-CM

## 2024-10-16 DIAGNOSIS — Z98.890 STATUS POST HARDWARE REMOVAL: Primary | ICD-10-CM

## 2024-10-16 DIAGNOSIS — M19.071 DJD (DEGENERATIVE JOINT DISEASE), ANKLE AND FOOT, RIGHT: ICD-10-CM

## 2024-10-16 DIAGNOSIS — M96.0 NONUNION OF SUBTALAR ARTHRODESIS: ICD-10-CM

## 2024-10-16 DIAGNOSIS — T85.848D PAIN DUE TO ANY DEVICE, IMPLANT OR GRAFT, SUBSEQUENT ENCOUNTER: ICD-10-CM

## 2024-10-16 DIAGNOSIS — M96.89 DELAYED UNION AFTER OSTEOTOMY: ICD-10-CM

## 2024-10-16 DIAGNOSIS — M76.821 POSTERIOR TIBIAL TENDONITIS, RIGHT: ICD-10-CM

## 2024-10-16 PROCEDURE — 99213 OFFICE O/P EST LOW 20 MIN: CPT | Performed by: PODIATRIST

## 2024-10-16 RX ORDER — DIAZEPAM 10 MG/1
TABLET ORAL
COMMUNITY

## 2024-10-16 RX ORDER — CEPHALEXIN 500 MG/1
CAPSULE ORAL
COMMUNITY
Start: 2024-09-12

## 2024-10-16 RX ORDER — ARIPIPRAZOLE 5 MG/1
TABLET ORAL
COMMUNITY
Start: 2024-09-10

## 2024-10-16 RX ORDER — ERYTHROMYCIN 5 MG/G
OINTMENT OPHTHALMIC
COMMUNITY
Start: 2024-09-12

## 2024-10-16 RX ORDER — LISDEXAMFETAMINE DIMESYLATE 20 MG/1
20 CAPSULE ORAL
COMMUNITY

## 2024-10-29 ENCOUNTER — E-ADVICE (OUTPATIENT)
Dept: PODIATRY | Age: 39
End: 2024-10-29

## 2024-10-30 ENCOUNTER — TELEPHONE (OUTPATIENT)
Dept: ORTHOPEDICS | Age: 39
End: 2024-10-30

## 2024-11-14 ENCOUNTER — TELEPHONE (OUTPATIENT)
Dept: ORTHOPEDICS | Age: 39
End: 2024-11-14

## 2025-01-08 ENCOUNTER — E-ADVICE (OUTPATIENT)
Dept: PODIATRY | Age: 40
End: 2025-01-08

## 2025-01-10 ENCOUNTER — APPOINTMENT (OUTPATIENT)
Dept: PODIATRY | Age: 40
End: 2025-01-10
Attending: PODIATRIST

## 2025-04-16 ENCOUNTER — IMAGING SERVICES (OUTPATIENT)
Dept: GENERAL RADIOLOGY | Age: 40
End: 2025-04-16
Attending: PODIATRIST

## 2025-04-16 ENCOUNTER — APPOINTMENT (OUTPATIENT)
Dept: PODIATRY | Age: 40
End: 2025-04-16

## 2025-04-16 DIAGNOSIS — M79.671 PAIN OF RIGHT HEEL: ICD-10-CM

## 2025-04-16 DIAGNOSIS — M65.971 SYNOVITIS OF RIGHT ANKLE: ICD-10-CM

## 2025-04-16 DIAGNOSIS — M71.371 OTHER BURSAL CYST, RIGHT ANKLE AND FOOT: ICD-10-CM

## 2025-04-16 DIAGNOSIS — M65.971 SYNOVITIS OF RIGHT ANKLE: Primary | ICD-10-CM

## 2025-04-16 DIAGNOSIS — Z98.890 STATUS POST HARDWARE REMOVAL: ICD-10-CM

## 2025-04-16 PROCEDURE — 73650 X-RAY EXAM OF HEEL: CPT | Performed by: RADIOLOGY

## 2025-04-16 PROCEDURE — 73610 X-RAY EXAM OF ANKLE: CPT | Performed by: RADIOLOGY

## 2025-04-16 RX ORDER — PAROXETINE 40 MG/1
TABLET, FILM COATED ORAL
COMMUNITY
Start: 2025-04-11

## 2025-04-16 RX ORDER — CREAM BASE NO.64
CREAM (GRAM) TOPICAL
COMMUNITY

## 2025-04-16 RX ORDER — CLINDAMYCIN AND BENZOYL PEROXIDE 10; 50 MG/G; MG/G
GEL TOPICAL
COMMUNITY
Start: 2025-04-14

## 2025-04-16 RX ORDER — DEXAMETHASONE SODIUM PHOSPHATE 4 MG/ML
4 INJECTION, SOLUTION INTRA-ARTICULAR; INTRALESIONAL; INTRAMUSCULAR; INTRAVENOUS; SOFT TISSUE ONCE
Status: COMPLETED | OUTPATIENT
Start: 2025-04-16 | End: 2025-04-16

## 2025-04-16 RX ORDER — BUSPIRONE HYDROCHLORIDE 5 MG/1
TABLET ORAL
COMMUNITY
Start: 2025-04-07

## 2025-04-16 RX ORDER — KETOCONAZOLE 20 MG/ML
SHAMPOO, SUSPENSION TOPICAL
COMMUNITY
Start: 2025-04-14

## 2025-04-16 RX ORDER — DEXTROAMPHETAMINE SACCHARATE, AMPHETAMINE ASPARTATE MONOHYDRATE, DEXTROAMPHETAMINE SULFATE AND AMPHETAMINE SULFATE 7.5; 7.5; 7.5; 7.5 MG/1; MG/1; MG/1; MG/1
CAPSULE, EXTENDED RELEASE ORAL
COMMUNITY
Start: 2025-04-11

## 2025-04-16 RX ORDER — TRIAMCINOLONE ACETONIDE 40 MG/ML
10 INJECTION, SUSPENSION INTRA-ARTICULAR; INTRAMUSCULAR ONCE
Status: COMPLETED | OUTPATIENT
Start: 2025-04-16 | End: 2025-04-16

## 2025-04-16 RX ORDER — DEXAMETHASONE SODIUM PHOSPHATE 4 MG/ML
2 INJECTION, SOLUTION INTRA-ARTICULAR; INTRALESIONAL; INTRAMUSCULAR; INTRAVENOUS; SOFT TISSUE ONCE
Status: COMPLETED | OUTPATIENT
Start: 2025-04-16 | End: 2025-04-16

## 2025-04-16 RX ORDER — ALPRAZOLAM 0.25 MG
TABLET ORAL
COMMUNITY
Start: 2025-04-07

## 2025-04-16 RX ADMIN — DEXAMETHASONE SODIUM PHOSPHATE 2 MG: 4 INJECTION, SOLUTION INTRA-ARTICULAR; INTRALESIONAL; INTRAMUSCULAR; INTRAVENOUS; SOFT TISSUE at 10:39

## 2025-04-16 RX ADMIN — DEXAMETHASONE SODIUM PHOSPHATE 4 MG: 4 INJECTION, SOLUTION INTRA-ARTICULAR; INTRALESIONAL; INTRAMUSCULAR; INTRAVENOUS; SOFT TISSUE at 10:40

## 2025-04-16 RX ADMIN — TRIAMCINOLONE ACETONIDE 10 MG: 40 INJECTION, SUSPENSION INTRA-ARTICULAR; INTRAMUSCULAR at 10:41

## 2025-05-23 ENCOUNTER — E-ADVICE (OUTPATIENT)
Dept: PODIATRY | Age: 40
End: 2025-05-23

## 2025-07-14 ENCOUNTER — APPOINTMENT (OUTPATIENT)
Dept: PAIN MANAGEMENT | Age: 40
End: 2025-07-14
Attending: PODIATRIST

## 2025-07-16 ENCOUNTER — APPOINTMENT (OUTPATIENT)
Dept: PODIATRY | Age: 40
End: 2025-07-16

## 2025-07-16 DIAGNOSIS — M25.371 ANKLE INSTABILITY, RIGHT: ICD-10-CM

## 2025-07-16 DIAGNOSIS — M71.371 OTHER BURSAL CYST, RIGHT ANKLE AND FOOT: ICD-10-CM

## 2025-07-16 DIAGNOSIS — M96.89 DELAYED UNION AFTER OSTEOTOMY: ICD-10-CM

## 2025-07-16 DIAGNOSIS — M19.071 DJD (DEGENERATIVE JOINT DISEASE), ANKLE AND FOOT, RIGHT: ICD-10-CM

## 2025-07-16 DIAGNOSIS — M65.971 SYNOVITIS OF RIGHT ANKLE: Primary | ICD-10-CM

## 2025-07-16 DIAGNOSIS — M96.0 NONUNION OF SUBTALAR ARTHRODESIS: ICD-10-CM

## 2025-07-16 DIAGNOSIS — Z98.890 STATUS POST HARDWARE REMOVAL: ICD-10-CM

## 2025-07-16 RX ORDER — TRIAMCINOLONE ACETONIDE 40 MG/ML
10 INJECTION, SUSPENSION INTRA-ARTICULAR; INTRAMUSCULAR ONCE
Status: COMPLETED | OUTPATIENT
Start: 2025-07-16 | End: 2025-07-16

## 2025-07-16 RX ORDER — DEXAMETHASONE SODIUM PHOSPHATE 4 MG/ML
4 INJECTION, SOLUTION INTRA-ARTICULAR; INTRALESIONAL; INTRAMUSCULAR; INTRAVENOUS; SOFT TISSUE ONCE
Status: COMPLETED | OUTPATIENT
Start: 2025-07-16 | End: 2025-07-16

## 2025-07-16 RX ADMIN — DEXAMETHASONE SODIUM PHOSPHATE 4 MG: 4 INJECTION, SOLUTION INTRA-ARTICULAR; INTRALESIONAL; INTRAMUSCULAR; INTRAVENOUS; SOFT TISSUE at 08:47

## 2025-07-16 RX ADMIN — TRIAMCINOLONE ACETONIDE 10 MG: 40 INJECTION, SUSPENSION INTRA-ARTICULAR; INTRAMUSCULAR at 08:49

## 2025-07-17 ENCOUNTER — TELEPHONE (OUTPATIENT)
Dept: ORTHOPEDICS | Age: 40
End: 2025-07-17

## 2025-07-25 ENCOUNTER — TELEPHONE (OUTPATIENT)
Dept: ORTHOPEDICS | Age: 40
End: 2025-07-25

## 2025-10-24 ENCOUNTER — APPOINTMENT (OUTPATIENT)
Dept: PODIATRY | Age: 40
End: 2025-10-24

## (undated) NOTE — MR AVS SNAPSHOT
[facility]  [phone]               Thank you for choosing us for your health care visit with Lina Vazquez MD.  We are glad to serve you and happy to provide you with this corado Take 1 tablet by mouth 4 (four) times daily. Commonly known as:  XANAX           amphetamine-dextroamphetamine 10 MG Tabs   Take 10 mg by mouth 2 (two) times daily.    Commonly known as:  ADDERALL           lidocaine 5 % Ptch   Place 1 patch onto the skin Visit [facility] online at  [email or web address]

## (undated) NOTE — LETTER
[address] [place] [address],  [address]  [address] [facility] [address] [place] [id number]  [phone]    21      To whom it may concern,   I attest that I saw patient Beth Singletary JU  RLE DVT in right popliteal vein: Diag